# Patient Record
Sex: FEMALE | Race: WHITE
[De-identification: names, ages, dates, MRNs, and addresses within clinical notes are randomized per-mention and may not be internally consistent; named-entity substitution may affect disease eponyms.]

---

## 2017-06-21 ENCOUNTER — HOSPITAL ENCOUNTER (OUTPATIENT)
Dept: HOSPITAL 53 - M RAD | Age: 77
End: 2017-06-21
Attending: NURSE PRACTITIONER
Payer: MEDICARE

## 2017-06-21 DIAGNOSIS — Z12.31: Primary | ICD-10-CM

## 2017-06-21 NOTE — REPMRS
Patient History

The patient states she has not had a clinical breast exam in over

a year. Patient is postmenopausal.Patient has had a 10-15 pound 

weight loss.

Family history of ovarian cancer in paternal aunt at age 60, 

unknown cancer in paternal cousin at age 25, and breast cancer in

daughter at age 45.

 

Digital Mammo Screening Bilat: June 21, 2017 - Exam #: 

[id number]

Bilateral CC and MLO view(s) were taken.

 

Technologist: Nancie Miller Technologist

Prior study comparison: June 23, 2016, bilateral digital mammo 

screening bilat performed at [facility].  September 4, 2014, bilateral bilat screen digital mammo, performed at 

[facility] ([facility]).

 

FINDINGS: There are scattered fibroglandular densities.  There 

has been no change in the appearance of the mammogram from the 

prior studies.  There is a mild amount of residual fibroglandular

tissue which is fairly symmetric. There is no interval 

development of dominant mass, architectural distortion, or 

clustered microcalcification suggestive of malignancy.

 

ASSESSMENT: BI-RADS/ACR category 1 mammogram. Negative.

 

Recommendation

Routine screening mammogram in 1 year (for women over age 40).

This mammogram was interpreted with the aid of an FDA-approved 

computer-aided dectection system.

 

Electronically Signed By: Kristian Suarez MD 06/21/17 [id number]

## 2018-02-14 ENCOUNTER — HOSPITAL ENCOUNTER (OUTPATIENT)
Dept: HOSPITAL 53 - M LAB REF | Age: 78
End: 2018-02-14
Attending: NURSE PRACTITIONER
Payer: MEDICARE

## 2018-02-14 DIAGNOSIS — R41.81: Primary | ICD-10-CM

## 2018-02-14 LAB
SYPHILIS: NONREACTIVE
VITAMIN B12 LEVEL: 323 PG/ML (ref 247–911)

## 2018-02-14 PROCEDURE — 82607 VITAMIN B-12: CPT

## 2018-07-19 ENCOUNTER — HOSPITAL ENCOUNTER (OUTPATIENT)
Dept: HOSPITAL 53 - M RAD | Age: 78
End: 2018-07-19
Attending: NURSE PRACTITIONER
Payer: MEDICARE

## 2018-07-19 DIAGNOSIS — Z12.31: Primary | ICD-10-CM

## 2018-07-19 PROCEDURE — 77067 SCR MAMMO BI INCL CAD: CPT

## 2018-10-24 ENCOUNTER — HOSPITAL ENCOUNTER (EMERGENCY)
Dept: HOSPITAL 53 - M ED | Age: 78
Discharge: ANOTHER HOSPITAL | End: 2018-10-24
Payer: MEDICARE

## 2018-10-24 DIAGNOSIS — Z79.899: ICD-10-CM

## 2018-10-24 DIAGNOSIS — K21.9: ICD-10-CM

## 2018-10-24 DIAGNOSIS — R27.0: ICD-10-CM

## 2018-10-24 DIAGNOSIS — I63.9: Primary | ICD-10-CM

## 2018-10-24 DIAGNOSIS — I10: ICD-10-CM

## 2018-10-24 LAB
ANION GAP: 4 MEQ/L (ref 8–16)
BASO #: 0 10^3/UL (ref 0–0.2)
BASO %: 0.3 % (ref 0–1)
BLOOD UREA NITROGEN: 9 MG/DL (ref 7–18)
CALCIUM LEVEL: 8.5 MG/DL (ref 8.8–10.2)
CARBON DIOXIDE LEVEL: 30 MEQ/L (ref 21–32)
CHLORIDE LEVEL: 106 MEQ/L (ref 98–107)
CK MB CFR.DF SERPL CALC: 1.92
CK SERPL-CCNC: 125 U/L (ref 26–192)
CK-MB VALUE MASS: 2 NG/ML (ref ?–3.6)
CREATININE FOR GFR: 0.47 MG/DL (ref 0.55–1.3)
EOS #: 0.1 10^3/UL (ref 0–0.5)
EOSINOPHIL NFR BLD AUTO: 1.3 % (ref 0–3)
GFR SERPL CREATININE-BSD FRML MDRD: > 60 ML/MIN/{1.73_M2} (ref 39–?)
GLUCOSE BLDC GLUCOMTR-MCNC: 76 MG/DL (ref 83–110)
GLUCOSE, FASTING: 82 MG/DL (ref 70–100)
GOLD SPEC TUBE: (no result)
HEMATOCRIT: 38.5 % (ref 36–47)
HEMOGLOBIN: 12.7 G/DL (ref 12–15.5)
IMMATURE GRANULOCYTE %: 0.3 % (ref 0–3)
INR: 0.92
LYMPH #: 2 10^3/UL (ref 1.5–4.5)
LYMPH %: 26.6 % (ref 24–44)
MEAN CORPUSCULAR HEMOGLOBIN: 31.2 PG (ref 27–33)
MEAN CORPUSCULAR HGB CONC: 33 G/DL (ref 32–36.5)
MEAN CORPUSCULAR VOLUME: 94.6 FL (ref 80–96)
MONO #: 0.7 10^3/UL (ref 0–0.8)
MONO %: 8.6 % (ref 0–5)
NEUTROPHILS #: 4.8 10^3/UL (ref 1.8–7.7)
NEUTROPHILS %: 62.9 % (ref 36–66)
NRBC BLD AUTO-RTO: 0 % (ref 0–0)
PARTIAL THROMBOPLASTIN TIME: 31.7 SECONDS (ref 25.4–37.6)
PLATELET COUNT, AUTOMATED: 242 10^3/UL (ref 150–450)
POTASSIUM SERUM: 4.2 MEQ/L (ref 3.5–5.1)
PROTHROMBIN TIME: 12.5 SECONDS (ref 12.1–14.4)
RED BLOOD COUNT: 4.07 10^6/UL (ref 4–5.4)
RED CELL DISTRIBUTION WIDTH: 13.3 % (ref 11.5–14.5)
SODIUM LEVEL: 140 MEQ/L (ref 136–145)
TROPONIN I: < 0.02 NG/ML (ref ?–0.1)
WHITE BLOOD COUNT: 7.6 10^3/UL (ref 4–10)

## 2018-10-24 PROCEDURE — 71045 X-RAY EXAM CHEST 1 VIEW: CPT

## 2019-03-08 ENCOUNTER — HOSPITAL ENCOUNTER (OUTPATIENT)
Dept: HOSPITAL 53 - M SDC | Age: 79
Discharge: HOME | End: 2019-03-08
Attending: INTERNAL MEDICINE
Payer: MEDICARE

## 2019-03-08 VITALS — WEIGHT: 158 LBS | BODY MASS INDEX: 31.02 KG/M2 | HEIGHT: 60 IN

## 2019-03-08 VITALS — DIASTOLIC BLOOD PRESSURE: 75 MMHG | SYSTOLIC BLOOD PRESSURE: 103 MMHG

## 2019-03-08 DIAGNOSIS — K21.9: ICD-10-CM

## 2019-03-08 DIAGNOSIS — Z79.82: ICD-10-CM

## 2019-03-08 DIAGNOSIS — E66.9: ICD-10-CM

## 2019-03-08 DIAGNOSIS — I10: ICD-10-CM

## 2019-03-08 DIAGNOSIS — E03.9: ICD-10-CM

## 2019-03-08 DIAGNOSIS — I63.9: Primary | ICD-10-CM

## 2019-03-08 DIAGNOSIS — R94.31: ICD-10-CM

## 2019-03-08 DIAGNOSIS — I25.2: ICD-10-CM

## 2019-03-08 DIAGNOSIS — E78.5: ICD-10-CM

## 2019-03-08 DIAGNOSIS — M54.5: ICD-10-CM

## 2019-03-08 DIAGNOSIS — Z79.899: ICD-10-CM

## 2019-03-08 DIAGNOSIS — R01.1: ICD-10-CM

## 2019-03-08 PROCEDURE — 93312 ECHO TRANSESOPHAGEAL: CPT

## 2019-03-08 PROCEDURE — 93320 DOPPLER ECHO COMPLETE: CPT

## 2019-03-08 PROCEDURE — 93325 DOPPLER ECHO COLOR FLOW MAPG: CPT

## 2019-03-08 NOTE — T-ECHO
DATE OF PROCEDURE:  03/08/2019

 

REFERRING PHYSICIAN: Dina Coburn MD

 

PREPROCEDURE DIAGNOSIS:  Cryptogenic stroke.

 

POSTPROCEDURE DIAGNOSIS: Cryptogenic stroke, patent foramen ovale.

 

FINDINGS: Patent foramen ovale.

 

PROCEDURE: Transesophageal echocardiogram with bubble study.

 

SURGEON: Addison Marks MD

 

ASSISTANT: None.

 

CONSCIOUS SEDATION: IV conscious sedation: Midazolam 4 mg IV

 

COMPLICATIONS: None.

 

PROCEDURE DESCRIPTION:

Rhythm was sinus. The patient received viscus lidocaine to gargle and then

swallow. She received a total of 4 mg of Midazolam IV for conscious sedation.

Esophageal intubation was accomplished without difficulty using a [employer]'s 3D

transesophageal echocardiogram probe. The region of the intra-atrial septum was

anatomically suspicious for presence of a patent foramen ovale. No color flow

Doppler was seen traversing the atrial septum. No intra-atrial septal aneurysm.

 

A total of three bubble studies was performed. The first bubble study was

performed using 1 mL of the patient's own blood mixed with a mL of air and 8 mL

of injectable saline. The other two bubbles studies that followed were performed

with 9 mL of injectable normal saline and 1 mL of air. Each of the bubble studies

was performed by swishing the contents of one 10 mL into another 10 mL syringe

interconnected by a three-way stopcock to create bubbles back and forth. With

each of the bubble studies Valsalva maneuver release was performed. On the third

bubble study, I also had the patient cough several times. The third bubble study,

during coughing was able to demonstrate abrupt opening of the patent foramen

ovale with a mild to moderate amount of saline bubble contrast to shunt

transiently from the right atrium to the left atrium via the patent foramen ovale

(PFO).

 

Atrial connections to the left atrium pulmonary veins was anatomically normal. No

mass or thrombi were seen within the atria or their appendages. The left and

right ventricles appeared normal in size and systolic function. Left ventricle

ejection fraction was 65% by visual estimate. No regional wall motion

abnormalities of the left ventricle. No pericardial effusion. The tricuspid,

pulmonic and mitral valves were structurally and functionally normal. Mild mitral

regurgitation was present and within normal limits. Aortic valve was three-cuspid

and displayed mild focal thickening and focal calcific deposits. No aortic

regurgitation.

 

Distal aortic arch and descending thoracic aorta showed mild atherosclerotic

plaque/atheroma.

 

CONCLUSIONS:

1. Small patent foramen ovale with demonstration of mild to moderate bubble

contrast shunting from right atrium to left atrium via the PFO during cough

maneuver.

2. Normal left ventricle size and systolic function.

3. No masses or thrombi within the atria or their appendages.

4. Mild aortic valve sclerosis of a three-cuspid aortic valve. No aortic

regurgitation.

5. Mild atherosclerosis/atheroma involving the distal aortic arch and descending

thoracic aorta.

 

RECOMMENDATIONS:

Consider closure of the PFO with a PFO closure device. Further management to be

decided by this patient's attending cardiologist, Dr. Zachery Reed.

## 2019-08-23 ENCOUNTER — HOSPITAL ENCOUNTER (EMERGENCY)
Dept: HOSPITAL 53 - M ED | Age: 79
Discharge: HOME | End: 2019-08-23
Payer: MEDICARE

## 2019-08-23 VITALS — WEIGHT: 162.04 LBS | HEIGHT: 64 IN | BODY MASS INDEX: 27.66 KG/M2

## 2019-08-23 VITALS — DIASTOLIC BLOOD PRESSURE: 89 MMHG | SYSTOLIC BLOOD PRESSURE: 120 MMHG

## 2019-08-23 DIAGNOSIS — G62.9: Primary | ICD-10-CM

## 2019-08-23 DIAGNOSIS — Z79.899: ICD-10-CM

## 2019-08-23 DIAGNOSIS — E78.5: ICD-10-CM

## 2019-08-23 DIAGNOSIS — I48.91: ICD-10-CM

## 2019-08-23 DIAGNOSIS — Z86.73: ICD-10-CM

## 2019-08-23 LAB
APTT BLD: 35.2 SECONDS (ref 25–38.4)
BASOPHILS # BLD AUTO: 0 10^3/UL (ref 0–0.2)
BASOPHILS NFR BLD AUTO: 0.2 % (ref 0–1)
BUN SERPL-MCNC: 13 MG/DL (ref 7–18)
CALCIUM SERPL-MCNC: 8.5 MG/DL (ref 8.8–10.2)
CHLORIDE SERPL-SCNC: 103 MEQ/L (ref 98–107)
CK MB CFR.DF SERPL CALC: 2.34
CK MB SERPL-MCNC: 1.8 NG/ML (ref ?–3.6)
CK SERPL-CCNC: 77 U/L (ref 26–192)
CO2 SERPL-SCNC: 30 MEQ/L (ref 21–32)
CREAT SERPL-MCNC: 0.68 MG/DL (ref 0.55–1.3)
EOSINOPHIL # BLD AUTO: 0.1 10^3/UL (ref 0–0.5)
EOSINOPHIL NFR BLD AUTO: 1.1 % (ref 0–3)
GFR SERPL CREATININE-BSD FRML MDRD: > 60 ML/MIN/{1.73_M2} (ref 39–?)
GLUCOSE SERPL-MCNC: 103 MG/DL (ref 70–100)
HCT VFR BLD AUTO: 37.5 % (ref 36–47)
HGB BLD-MCNC: 12.6 G/DL (ref 12–15.5)
INR PPP: 1.07
LYMPHOCYTES # BLD AUTO: 2 10^3/UL (ref 1.5–4.5)
LYMPHOCYTES NFR BLD AUTO: 20 % (ref 24–44)
MCH RBC QN AUTO: 30.9 PG (ref 27–33)
MCHC RBC AUTO-ENTMCNC: 33.6 G/DL (ref 32–36.5)
MCV RBC AUTO: 91.9 FL (ref 80–96)
MONOCYTES # BLD AUTO: 0.7 10^3/UL (ref 0–0.8)
MONOCYTES NFR BLD AUTO: 7.2 % (ref 0–5)
NEUTROPHILS # BLD AUTO: 7.3 10^3/UL (ref 1.8–7.7)
NEUTROPHILS NFR BLD AUTO: 71.2 % (ref 36–66)
PLATELET # BLD AUTO: 228 10^3/UL (ref 150–450)
POTASSIUM SERPL-SCNC: 3.8 MEQ/L (ref 3.5–5.1)
PROTHROMBIN TIME: 13.6 SECONDS (ref 11.8–14)
RBC # BLD AUTO: 4.08 10^6/UL (ref 4–5.4)
SODIUM SERPL-SCNC: 139 MEQ/L (ref 136–145)
T4 FREE SERPL-MCNC: 0.93 NG/DL (ref 0.76–1.46)
TROPONIN I SERPL-MCNC: < 0.02 NG/ML (ref ?–0.1)
TSH SERPL DL<=0.005 MIU/L-ACNC: 1.64 UIU/ML (ref 0.36–3.74)
WBC # BLD AUTO: 10.2 10^3/UL (ref 4–10)

## 2019-08-23 NOTE — ECGEPIP
[facility] - ED

                                       

                                       Test Date:    2019

Pat Name:     VALENTIN CASTRO                Department:   

Patient ID:   [id number]                 Room:         -

Gender:       Female                   Technician:   BRODY

:          1940               Requested By: ADDISON JUAREZ

Order Number: [id number]     Reading MD:   Addsion López

                                 Measurements

Intervals                              Axis          

Rate:         115                      P:            

WV:           0                        QRS:          -1

QRSD:         89                       T:            7

QT:           310                                    

QTc:          430                                    

                           Interpretive Statements

Uncertain supraventricular rhythm, suspect atrial fibrillation with RVR

LOW QRS VOLTAGE IN EXTREMITY LEADS

Nonspecific T wave abnormality

Electronically Signed on 2019 23:52:58 EDT by Addison López

## 2019-08-23 NOTE — REP
REASON: Stroke like symptoms.

 

COMPARISON: 10/24/2018.

 

There is no significant change from the prior exam. The ventricles and sulci are

stable. Scattered lucencies are seen throughout the deep cerebral white matter of

the corona radiata and centrum semi-ovale bilaterally. There are no extra axial

collections. There is no shift in the midline structures. There is no evidence of

an acute intracranial hemorrhagic or nonhemorrhagic event. There is no evidence

of a skull fracture. The imaged paranasal sinuses and mastoid air cells are

unchanged and remaining clear.

 

IMPRESSION:No significant change from the prior exam. Chronic changes as

described above. No evidence of acute disease.

 

 

Electronically Signed by

Won Briceno DO 09/03/2019 10:06 A

## 2019-08-23 NOTE — REP
CHEST, PORTABLE:

 

AP portable view of the chest is performed and compared to prior study of

10/24/2018. There is mild to moderate elevation of the right hemidiaphragm again

noted. There is no acute infiltrate. Cardiac silhouette is upper limits or

normal. There is mild calcification and tortuosity of the thoracic aorta. There

is a hiatal hernia. Mediastinal silhouette is unchanged.

 

IMPRESSION:

 

No acute infiltrate.

 

 

Electronically Signed by

Kristian Suarez MD 08/25/2019 11:10 P

## 2020-01-07 ENCOUNTER — HOSPITAL ENCOUNTER (OUTPATIENT)
Dept: HOSPITAL 53 - M SFHCPLAZ | Age: 80
End: 2020-01-07
Attending: NURSE PRACTITIONER
Payer: MEDICARE

## 2020-01-07 DIAGNOSIS — R19.7: Primary | ICD-10-CM

## 2020-01-07 DIAGNOSIS — E03.9: ICD-10-CM

## 2020-01-07 LAB
ALBUMIN SERPL BCG-MCNC: 2.2 GM/DL (ref 3.2–5.2)
ALT SERPL W P-5'-P-CCNC: 39 U/L (ref 12–78)
BILIRUB SERPL-MCNC: 0.6 MG/DL (ref 0.2–1)
BUN SERPL-MCNC: 9 MG/DL (ref 7–18)
CALCIUM SERPL-MCNC: 8.6 MG/DL (ref 8.8–10.2)
CHLORIDE SERPL-SCNC: 97 MEQ/L (ref 98–107)
CO2 SERPL-SCNC: 32 MEQ/L (ref 21–32)
CREAT SERPL-MCNC: 0.49 MG/DL (ref 0.55–1.3)
GFR SERPL CREATININE-BSD FRML MDRD: > 60 ML/MIN/{1.73_M2} (ref 39–?)
GLUCOSE SERPL-MCNC: 85 MG/DL (ref 70–100)
HCT VFR BLD AUTO: 32.5 % (ref 36–47)
HGB BLD-MCNC: 10.6 G/DL (ref 12–15.5)
MCH RBC QN AUTO: 29.4 PG (ref 27–33)
MCHC RBC AUTO-ENTMCNC: 32.6 G/DL (ref 32–36.5)
MCV RBC AUTO: 90 FL (ref 80–96)
PLATELET # BLD AUTO: 432 10^3/UL (ref 150–450)
POTASSIUM SERPL-SCNC: 3.5 MEQ/L (ref 3.5–5.1)
PROT SERPL-MCNC: 6.2 GM/DL (ref 6.4–8.2)
RBC # BLD AUTO: 3.61 10^6/UL (ref 4–5.4)
SODIUM SERPL-SCNC: 135 MEQ/L (ref 136–145)
TSH SERPL DL<=0.005 MIU/L-ACNC: 3.71 UIU/ML (ref 0.36–3.74)
WBC # BLD AUTO: 14 10^3/UL (ref 4–10)

## 2020-01-07 PROCEDURE — 84443 ASSAY THYROID STIM HORMONE: CPT

## 2020-01-07 PROCEDURE — 36415 COLL VENOUS BLD VENIPUNCTURE: CPT

## 2020-01-07 PROCEDURE — 85027 COMPLETE CBC AUTOMATED: CPT

## 2020-01-07 PROCEDURE — 80053 COMPREHEN METABOLIC PANEL: CPT

## 2020-01-08 ENCOUNTER — HOSPITAL ENCOUNTER (OUTPATIENT)
Dept: HOSPITAL 53 - M SFHCPLAZ | Age: 80
End: 2020-01-08
Attending: NURSE PRACTITIONER
Payer: MEDICARE

## 2020-01-08 DIAGNOSIS — R19.7: Primary | ICD-10-CM

## 2020-04-28 ENCOUNTER — HOSPITAL ENCOUNTER (INPATIENT)
Dept: HOSPITAL 53 - M ED | Age: 80
LOS: 6 days | Discharge: HOME HEALTH | DRG: 872 | End: 2020-05-04
Attending: INTERNAL MEDICINE | Admitting: INTERNAL MEDICINE
Payer: MEDICARE

## 2020-04-28 VITALS — DIASTOLIC BLOOD PRESSURE: 74 MMHG | SYSTOLIC BLOOD PRESSURE: 113 MMHG

## 2020-04-28 VITALS — HEIGHT: 62 IN | WEIGHT: 158.07 LBS | BODY MASS INDEX: 29.09 KG/M2

## 2020-04-28 DIAGNOSIS — K57.20: ICD-10-CM

## 2020-04-28 DIAGNOSIS — E03.9: ICD-10-CM

## 2020-04-28 DIAGNOSIS — K21.9: ICD-10-CM

## 2020-04-28 DIAGNOSIS — M47.812: ICD-10-CM

## 2020-04-28 DIAGNOSIS — E87.1: ICD-10-CM

## 2020-04-28 DIAGNOSIS — A41.9: Primary | ICD-10-CM

## 2020-04-28 DIAGNOSIS — E86.0: ICD-10-CM

## 2020-04-28 DIAGNOSIS — Z79.01: ICD-10-CM

## 2020-04-28 DIAGNOSIS — R55: ICD-10-CM

## 2020-04-28 DIAGNOSIS — F03.90: ICD-10-CM

## 2020-04-28 DIAGNOSIS — I48.91: ICD-10-CM

## 2020-04-28 DIAGNOSIS — E46: ICD-10-CM

## 2020-04-28 DIAGNOSIS — E78.2: ICD-10-CM

## 2020-04-28 DIAGNOSIS — F41.9: ICD-10-CM

## 2020-04-28 DIAGNOSIS — K44.9: ICD-10-CM

## 2020-04-28 DIAGNOSIS — F32.9: ICD-10-CM

## 2020-04-28 DIAGNOSIS — Z79.899: ICD-10-CM

## 2020-04-28 DIAGNOSIS — Q21.1: ICD-10-CM

## 2020-04-28 DIAGNOSIS — Z96.641: ICD-10-CM

## 2020-04-28 DIAGNOSIS — E87.6: ICD-10-CM

## 2020-04-28 DIAGNOSIS — Z86.73: ICD-10-CM

## 2020-04-28 LAB
ALBUMIN SERPL BCG-MCNC: 2.2 GM/DL (ref 3.2–5.2)
ALT SERPL W P-5'-P-CCNC: 30 U/L (ref 12–78)
APTT BLD: 39 SECONDS (ref 25–38.4)
BILIRUB CONJ SERPL-MCNC: 0.4 MG/DL (ref 0–0.2)
BILIRUB SERPL-MCNC: 0.7 MG/DL (ref 0.2–1)
BUN SERPL-MCNC: 15 MG/DL (ref 7–18)
CALCIUM SERPL-MCNC: 8.6 MG/DL (ref 8.8–10.2)
CHLORIDE SERPL-SCNC: 96 MEQ/L (ref 98–107)
CO2 SERPL-SCNC: 25 MEQ/L (ref 21–32)
CREAT SERPL-MCNC: 0.39 MG/DL (ref 0.55–1.3)
CRP SERPL-MCNC: 16.7 MG/DL (ref 0–0.3)
GFR SERPL CREATININE-BSD FRML MDRD: > 60 ML/MIN/{1.73_M2} (ref 39–?)
GLUCOSE SERPL-MCNC: 100 MG/DL (ref 70–100)
HCT VFR BLD AUTO: 38.6 % (ref 36–47)
HGB BLD-MCNC: 12.6 G/DL (ref 12–15.5)
INR PPP: 1.6
MCH RBC QN AUTO: 27.1 PG (ref 27–33)
MCHC RBC AUTO-ENTMCNC: 32.6 G/DL (ref 32–36.5)
MCV RBC AUTO: 83 FL (ref 80–96)
NT-PRO BNP: 2523 PG/ML (ref ?–450)
PLATELET # BLD AUTO: 437 10^3/UL (ref 150–450)
POTASSIUM SERPL-SCNC: 2.9 MEQ/L (ref 3.5–5.1)
PROT SERPL-MCNC: 7.5 GM/DL (ref 6.4–8.2)
PROTHROMBIN TIME: 18.7 SECONDS (ref 11.8–14)
RBC # BLD AUTO: 4.65 10^6/UL (ref 4–5.4)
SODIUM SERPL-SCNC: 129 MEQ/L (ref 136–145)
T4 FREE SERPL-MCNC: 1.27 NG/DL (ref 0.76–1.46)
TSH SERPL DL<=0.005 MIU/L-ACNC: 5.31 UIU/ML (ref 0.36–3.74)
WBC # BLD AUTO: 24.1 10^3/UL (ref 4–10)

## 2020-04-28 RX ADMIN — POTASSIUM CHLORIDE SCH MLS/HR: 7.46 INJECTION, SOLUTION INTRAVENOUS at 15:45

## 2020-04-28 RX ADMIN — POTASSIUM CHLORIDE SCH MLS/HR: 7.46 INJECTION, SOLUTION INTRAVENOUS at 17:00

## 2020-04-28 RX ADMIN — POTASSIUM CHLORIDE SCH MLS/HR: 7.46 INJECTION, SOLUTION INTRAVENOUS at 13:46

## 2020-04-28 NOTE — REP
CT study of the cervical spine without contrast:

 

History: Injury in a fall.

 

Technique:  Helical scanning is acquired and overlapping 2 mm high resolution

axial images were generated and reviewed at bone and soft tissue window settings.

Coronal and sagittal multiplanar re-formations images are generated.

 

CT findings:  There is no evidence of cervical spine element fracture.  No skull

base fracture is seen.  Cervical vertebral body heights are preserved.  Alignment

is normal.  Facet joints are normally aligned bilaterally at each cervical level

on multiplanar re-formations images.  There is no evidence of intraspinal or

paraspinal hematoma.  No extra vertebral abnormality is seen.

 

There is straightening of the normal cervical lordosis.  Degenerative disc

changes are noted most pronounced at C5-6 and C6-7 but also to a lesser degree at

C4-5 and C3-4.  There is some degenerative disc calcification at these levels.

Mild osteoarthritic facet hypertrophy and joint space narrowing is seen

bilaterally in the cervical facet joints.  There is a dextroconvex curvature.

 

Impression:

 

Degenerative spondylosis changes.  Straightening and dextroconvex curvature

noted.  Otherwise negative CT study of the cervical spine without contrast.  No

fracture seen.

 

 

Electronically Signed by

Porfirio Vasquez MD 04/28/2020 11:52 A

## 2020-04-28 NOTE — REP
CT ABDOMEN/PELVIS WITH IV CONTRAST:

 

TECHNIQUE:  Axial contrast enhanced images from the lung bases to the pubic

symphysis using 100 mL Isovue-370 intravenous contrast material with multiplanar

reformations.

 

In the visualized lung bases, there are dependent atelectatic changes. There is a

small left pleural effusion.

 

There is a moderate to large hiatal hernia.

 

The liver appears unremarkable. Gallbladder is moderately distended with no

definite intraluminal calculus. Spleen is normal in size with no intrinsic

abnormality. There is mild adrenal gland thickening bilaterally. Pancreas

demonstrates no mass. There are cysts of each kidney with no hydronephrosis.

There is no abdominal aortic aneurysm. There is no adenopathy.

 

Scattered free air is seen in the upper abdomen and in the pelvis. There is mild

free fluid in the pelvis. Diffuse colonic diverticulosis is noted. There is

diffuse thickening of the sigmoid colon, compatible with diverticulitis. There is

adjacent phlegmonous change with ill-defined air and fluid in the right pelvis

extending superiorly. There is a right lower quadrant air-fluid collection,

consistent with an abscess having a diameter of approximately 9.5 cm. Large

calcified mass anteriorly in the pelvis measures about 5 cm in diameter and

probably represents a calcified uterine fibroid. Metallic prosthesis is noted of

the right hip. There are degenerative changes of the lumbar spine.

 

IMPRESSION:

 

Small left pleural effusion.

 

Scattered free air in the abdomen and pelvis. There appears to be perforated

diverticulitis of the sigmoid with adjacent extraluminal air and fluid and

phlegmonous change in the right pelvis extending superiorly. There is a right

lower quadrant abscess measuring 9.5 cm in diameter. Findings were conveyed to

Dr. Jenkins in the ER at the time of the exam.

 

Moderate to large hiatal hernia. Moderately distended gallbladder. Calcified

uterine fibroid.

 

 

Electronically Signed by

Kristian Suarez MD 04/29/2020 09:34 A

## 2020-04-28 NOTE — REP
CT BRAIN WITHOUT CONTRAST:

 

HISTORY:  Injury in a fall.

 

COMPARISON STUDY:  August 23, 2019.

 

FINDINGS:   Digital preliminary  radiographs are unremarkable.  Bone window

settings demonstrate an intact bony calvarium.  Visualized paranasal sinuses are

clear.  No intraorbital abnormality is appreciated.

 

On soft tissue window settings, there is mild to moderate generalized volume loss

again noted.  There is evidence of an old right cerebellar infarct superiorly

unchanged.  No acute cortical infarction is seen.  There is no evidence of

intracranial hemorrhage.  No mass, extra-axial fluid collection or midline shift

is seen.

 

IMPRESSION:

 

No acute intracranial abnormality.  Generalized volume loss.  Old right superior

cerebellar infarct.  Otherwise negative.

 

 

Electronically Signed by

Porfirio Vasquez MD 04/28/2020 01:17 P

## 2020-04-28 NOTE — HPEPDOC
[facility] Medical History & Physical


Date of Admission


2020


Date of Service:  2020


Primary Care Physician:  Virginia Benavides


Attending Physician:  GONDAL,KHUBAIB N. MD





History and Physical


PRIMARY CARE PROVIDER:  Virginia Benavides





ATTENDING: Dr. Khubaib Gondal





CHIEF COMPLAINT: LOC





HISTORY OF PRESENT ILLNESS: Patient is a 80 year old female presenting with 

chief complaint of loss of consciousness. Patient was found by her daughter this

morning after finding her on the ground. When she found her she was conscious 

and did not remember what happened but thinks she did lose consciousness and hit

her head on the ground. She states she has had loose stools since last Thursday,

but thinks there have only been 2/day with decreased PO intake. In the ED she 

was found to be tachycardic with 2+ pitting edema, WBC count of 24, 

hyponatremia, hypokalemia, lactic acidosis, and an elevated BNP. CT head, C-

spine, CXR were negative, with CTabd/pelvis demonstrating microperforation and 

RLQ abdominal abscess. Patient received 1x zosyn and 1.5 liters of fluid 

resuscitation.








PAST MEDICAL HISTORY: 


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


GERD


Hypothyroidism


Mixed hyperlipidemia


Cognitive impairment


PFO





PAST SURGICAL HISTORY:


Transverse carpal tunnel release 


Left rotator cuff repair


Right total hip arthroplasty


revision of right hip








SOCIAL HISTORY: denies alcohol use, denies use of tobacco products, denies any 

marijuana, heroin, cocaine, or PCP use. 








FAMILY HISTORY: Patient does not recall. 





ALLERGIES: Please see below.





REVIEW OF SYSTEMS:


GENERAL: Denies fevers, chills, recent unexpected weight change, night sweats, 

hemoptysis


HEENT: Denies headache, dizziness, vision changes, hearing loss, sore throat


CARDIOVASCULAR: Denies chest pain, palpitations, orthopnea


RESPIRATORY: Denies shortness of breath, wheezing, cough


GASTROINTESTINAL: denies nausea, vomiting, abdominal pain, constipation, bloody 

stool. Admits to loose stool since last Thursday. 


GENITOURINARY: Denies dysuria,urinary urgency, hematuria.


MUSCULOSKELETAL: Denies muscle/joint pain, weakness, stiffness


NEUROLOGICAL: Denies any numbness/tingling, focal weakness, or syncope





HOME MEDICATIONS: Please see below. 





PHYSICAL EXAMINATION:


Vitals: (see below) 


General: Pale appearing female in no acute distress, laying comfortably in bed.


HEENT: Normocephalic, atraumatic. EOMI. No scleral icterus. dry mucous 

membranes. No pharyngeal erythema or uvular deviation. 


Neck: No JVD, lymphadenopathy, or thyromegaly. 


Cardiac: RRR, Normal S1 and S2, No murmurs, gallops, rubs. 


Pulm: Clear to auscultation b/l. Symmetric thorax. No wheezing, crackles, 

rhonchi


Abd: Bowel Sounds present. Abdomen is soft, non-tender, non-distended. No 

guarding, rebound tenderness, or rigidity. No hepatosplenomegaly. No masses or 

eccymosis. 


Ext: 2+ pitting edema up to knees in bilateral LE, no cyanosis


Vascular: Capillary refill ~3seconds


Neuro: No focal neuro deficits. AOx3





LABORATORY DATA: See below.





IMAGIN2020 Head CT:


No acute intracranial abnormality. Generalized volume loss. Old right superior 

cerebellar infarct. Otherwise negative.





2020 Cervical spine CT:


Degenerative spondylosis changes. Straightening and dextroconvex curvature 

noted. Otherwise negative CT study of the cervical spine without contrast. No 

fracture seen.





2020 CXR:


No evidence of acute pulmonary disease





2020 CT abd/pelvis w/ contrast:


Small left pleural effusion. Scattered free air in the abdomen and pelvis. There

appears to be perforated diverticulitis of the sigmoid with adjacent 

extraluminal air and fluid and phlegmonous change in the right pelvis extending 

superiorly. There is a right lower quadrant abscess measuring 9.5 cm in 

diameter. Findings were conveyed to Dr. Jenkins in the ER at the time of the 

exam. Moderate to large hiatal hernia. Moderately distended gallbladder. 

Calcified uterine fibroid.


 


MICROBIOLOGY: Please see below. 





ASSESSMENT/PLAN: 


#. Sepsis from RLQ abscess likely 2/2 perforated diverticulitis


-Cipro/flagyl for diverticulitis. IR drainage to take place tomorrow morning. 

General surgery is aware of patient should the patient decompensate. Initiating 

gentle IVF hydration as patient has 2+ pitting edema with elevated BNP. Lactic 

acid elevated at 2.4, will continue with gentle IVF. 





#. Bilateral lower extremity edema


-Echo/bubble study in 2019 showing LVEF of 65% and small PFO. Mild 

atherosclerosis/atheroma involving distal aortic arch and descending thoracic 

aorta. 


-Repeat urgent echo pending


-Patient seems intravascularly depleted, will continue with IVF @75ml/hr after 

1.5L in ED





#. Hypovolemic hypotonic hyponatremia


- Likely secondary to decreased PO intake, dehydration since this past Thursday 





#. Hypokalemia


- Likely secondary to dehydration and decreased PO intake. 





#. LOC


-We suspect this is secondary to dehydration from her loose stool, she is 

showing no neuro deficits in the room and is alert/oriented but is clinically 

dehydrated





#. Afib


-Holding eliquis for procedure tomorrow morning, will resume after. 


- Continue atenolol for rate control





#. Hypothyroidism


-Continue synthroid





#. GERD


-Patient is on IV pantoprazole





#. Depression/anxiety


-Holding home sertraline until after procedure





#. Hypercholesterolemia


- Holding home statin





#. Dementia


-Holding home donepezil





-DVT prophy: holding for procedure, teds/seqs





Attending attestation:


I evaluated and examined the patient in person; I discussed the care with 

Resident in detail and agree with the plan above.





Vital Signs





Vital Signs








  Date Time  Temp Pulse Resp B/P (MAP) Pulse Ox O2 Delivery O2 Flow Rate FiO2


 


20 16:00  122 18 103/57 (72) 95 Room Air  


 


20 15:01 96.8       











Laboratory Data


Labs 24H


Laboratory Tests 2


20 12:44: 


Nucleated Red Blood Cells % (auto) 0.0, Anion Gap 8, Glomerular Filtration Rate 

> 60.0, Calcium Level 8.6L, Total Bilirubin 0.7, Direct Bilirubin 0.4H, 

Aspartate Amino Transf (AST/SGOT) 32, Alanine Aminotransferase (ALT/SGPT) 30, 

Alkaline Phosphatase 123H, C-Reactive Protein, Quantitative 16.70H, 

NT-Pro-B-Type Natriuretic Peptide 2523H, Total Protein 7.5, Albumin 2.2L, 

Albumin/Globulin Ratio 0.42L, Thyroid Stimulating Hormone (TSH) 5.310H, Free 

Thyroxine 1.27


20 15:37: Lactic Acid Level 2.4*H


CBC/BMP


Laboratory Tests


20 12:44








Microbiology





Microbiology


20 Blood Culture, Received


          Pending


20 Blood Culture, Received


          Pending





Home Medications


Scheduled


Apixaban (Eliquis) 5 Mg Tablet, 5 MG PO BID


Atenolol (Atenolol) 25 Mg Tablet, 25 MG PO DAILY


Atorvastatin Calcium (Atorvastatin Calcium) 40 Mg Tab, 40 MG PO QHS


Ciprofloxacin HCl (Ciprofloxacin HCl) 500 Mg Tablet, 500 MG PO BID


   FILLED 20 FOR 10 DAYS 


Donepezil HCl (Donepezil HCl) 10 Mg Tablet, 10 MG PO QHS


Furosemide (Lasix) 40 Mg Tablet, 40 MG PO DAILY


Levothyroxine Sodium (Levothyroxine Sodium) 50 Mcg Tab, 50 MCG PO DAILY


Metronidazole (Flagyl) 500 Mg Tablet, 500 MG PO Q8H


   FILLED 20 FOR 10 DAYS 


Multivitamins (Thera M Plus Tablet) 1 Tab Tab, 1 TAB PO DAILY


Omeprazole (Omeprazole) 20 Mg Cap, 20 MG PO BID


Sertraline HCl (Sertraline HCl) 50 Mg Tablet, 50 MG PO DAILY





Allergies


Coded Allergies:  


     No Known Allergies (Unverified , 20)





A-FIB/CHADSVASC


A-FIB History


Current/History of A-Fib/PAF?:  No





GME ATTESTATION


GME ATTESTATION


My faculty preceptor for this patient encounter was physically present during 

the encounter and was fully available. All aspects of the patient interview, 

examination, medical decision making process, and medical care plan development 

were reviewed and approved by the faculty preceptor. The faculty preceptor is 

aware and concurs with the plan as stated in the body of this note and will 

attest to such by his/her cosignature.











WENDY SORENSEN DO                 2020 17:31


GONDAL,KHUBAIB N. MD            May 5, 2020 19:03

## 2020-04-28 NOTE — REP
CHEST, SINGLE VIEW:

 

Single view of the chest is performed and compared to a prior study of

08/23/2019.

 

No acute infiltrate is seen.  There is again elevation of the right

hemidiaphragm.  Patient is rotated toward the right.  Cardiac silhouette is

prominent.  Visualized osseous structures demonstrate no definite fracture.

 

IMPRESSION:

 

No evidence of acute pulmonary disease.

 

 

Electronically Signed by

Kristian Suarez MD 04/28/2020 03:24 P

## 2020-04-29 VITALS — SYSTOLIC BLOOD PRESSURE: 100 MMHG | DIASTOLIC BLOOD PRESSURE: 70 MMHG

## 2020-04-29 VITALS — SYSTOLIC BLOOD PRESSURE: 115 MMHG | DIASTOLIC BLOOD PRESSURE: 67 MMHG

## 2020-04-29 VITALS — DIASTOLIC BLOOD PRESSURE: 88 MMHG | SYSTOLIC BLOOD PRESSURE: 133 MMHG

## 2020-04-29 VITALS — SYSTOLIC BLOOD PRESSURE: 116 MMHG | DIASTOLIC BLOOD PRESSURE: 72 MMHG

## 2020-04-29 VITALS — DIASTOLIC BLOOD PRESSURE: 68 MMHG | SYSTOLIC BLOOD PRESSURE: 102 MMHG

## 2020-04-29 VITALS — SYSTOLIC BLOOD PRESSURE: 106 MMHG | DIASTOLIC BLOOD PRESSURE: 77 MMHG

## 2020-04-29 LAB
ALBUMIN SERPL BCG-MCNC: 1.7 GM/DL (ref 3.2–5.2)
ALT SERPL W P-5'-P-CCNC: 22 U/L (ref 12–78)
ANISOCYTOSIS BLD QL SMEAR: (no result)
BILIRUB SERPL-MCNC: 0.5 MG/DL (ref 0.2–1)
BUN SERPL-MCNC: 11 MG/DL (ref 7–18)
CALCIUM SERPL-MCNC: 7.7 MG/DL (ref 8.8–10.2)
CHLORIDE SERPL-SCNC: 102 MEQ/L (ref 98–107)
CO2 SERPL-SCNC: 24 MEQ/L (ref 21–32)
CREAT SERPL-MCNC: 0.26 MG/DL (ref 0.55–1.3)
CRP SERPL-MCNC: 10.7 MG/DL (ref 0–0.3)
GFR SERPL CREATININE-BSD FRML MDRD: > 60 ML/MIN/{1.73_M2} (ref 39–?)
GLUCOSE SERPL-MCNC: 67 MG/DL (ref 70–100)
HCT VFR BLD AUTO: 29.6 % (ref 36–47)
HGB BLD-MCNC: 9.8 G/DL (ref 12–15.5)
LYMPHOCYTES NFR BLD MANUAL: 8 % (ref 16–44)
MCH RBC QN AUTO: 27 PG (ref 27–33)
MCHC RBC AUTO-ENTMCNC: 33.1 G/DL (ref 32–36.5)
MCV RBC AUTO: 81.5 FL (ref 80–96)
MONOCYTES NFR BLD MANUAL: 7 % (ref 0–5)
NEUTROPHILS NFR BLD MANUAL: 85 % (ref 28–66)
PLATELET # BLD AUTO: 405 10^3/UL (ref 150–450)
PLATELET BLD QL SMEAR: NORMAL
POTASSIUM SERPL-SCNC: 4.3 MEQ/L (ref 3.5–5.1)
PROT SERPL-MCNC: 5.2 GM/DL (ref 6.4–8.2)
RBC # BLD AUTO: 3.63 10^6/UL (ref 4–5.4)
SODIUM SERPL-SCNC: 133 MEQ/L (ref 136–145)
WBC # BLD AUTO: 19.9 10^3/UL (ref 4–10)

## 2020-04-29 PROCEDURE — 0W9G30Z DRAINAGE OF PERITONEAL CAVITY WITH DRAINAGE DEVICE, PERCUTANEOUS APPROACH: ICD-10-PCS | Performed by: PHYSICIAN ASSISTANT

## 2020-04-29 RX ADMIN — ACETAMINOPHEN PRN MG: 325 TABLET ORAL at 20:49

## 2020-04-29 RX ADMIN — ACETAMINOPHEN PRN MG: 325 TABLET ORAL at 09:18

## 2020-04-29 RX ADMIN — APIXABAN SCH MG: 5 TABLET, FILM COATED ORAL at 20:49

## 2020-04-29 RX ADMIN — LEVOTHYROXINE SODIUM SCH MCG: 50 TABLET ORAL at 05:57

## 2020-04-29 RX ADMIN — CIPROFLOXACIN SCH MLS/HR: 2 INJECTION, SOLUTION INTRAVENOUS at 17:20

## 2020-04-29 RX ADMIN — METRONIDAZOLE SCH MLS/HR: 500 INJECTION, SOLUTION INTRAVENOUS at 04:51

## 2020-04-29 RX ADMIN — ATORVASTATIN CALCIUM SCH MG: 20 TABLET, FILM COATED ORAL at 20:49

## 2020-04-29 RX ADMIN — CIPROFLOXACIN SCH MLS/HR: 2 INJECTION, SOLUTION INTRAVENOUS at 05:57

## 2020-04-29 RX ADMIN — METRONIDAZOLE SCH MLS/HR: 500 INJECTION, SOLUTION INTRAVENOUS at 20:49

## 2020-04-29 RX ADMIN — DEXTROSE MONOHYDRATE SCH MG: 50 INJECTION, SOLUTION INTRAVENOUS at 09:17

## 2020-04-29 RX ADMIN — METRONIDAZOLE SCH MLS/HR: 500 INJECTION, SOLUTION INTRAVENOUS at 12:28

## 2020-04-29 NOTE — REP
Ultrasound-guided abscess drain

 

The procedure was performed by WILSON Holland, under the direct

supervision of Dr. Suarez.

 

The risks and benefits of the procedure were explained to the patient and

informed consent was obtained both verbally and written.  Directly prior to the

start of the procedure, a formal timeout was completed in the procedure room.

 

Under ultrasound guidance, the right lower quadrant abscess was localized and

skin was marked. The skin was then prepped and draped in a sterile fashion.  6 ml

of buffered lidocaine was used as a local anesthetic.  Using ultrasound guidance,

an 10-Faroese multi side-hole the pigtail catheter was inserted using trocar

technique.  120 mL of pus fluid was withdrawn and sent to the lab for further

analysis. A drainage bag was hooked up to the end of the catheter, and the

patient was discharged from the department.

 

 

 

 

Reviewed by

WILSON Edwards 04/29/2020 01:35 P

Electronically Signed by

Kristian Suarez MD 04/29/2020 01:42 P

## 2020-04-29 NOTE — ECGEPIP
[facility] - ED

                                       

                                       Test Date:    2020

Pat Name:     VALENTIN CASTRO                Department:   

Patient ID:   [id number]                 Room:         -

Gender:       Female                   Technician:   lexi sanchez

:          1940               Requested By: SANA WREN 

Order Number: [id number]     Reading MD:   Sharmin Dimas

                                 Measurements

Intervals                              Axis          

Rate:         102                      P:            

AR:           0                        QRS:          -3

QRSD:         94                       T:            -33

QT:           371                                    

QTc:          485                                    

                           Interpretive Statements

ATRIAL FIBRILLATION WITH RAPID VENTRICULAR RESPONSE

LOW QRS VOLTAGE IN EXTREMITY LEADS

ABNORMAL RHYTHM ECG

NSTTW abnormalities

DECREASED RATE 19

Electronically Signed on 2020 16:35:30 EDT by Sharmin Dimas

## 2020-04-30 VITALS — SYSTOLIC BLOOD PRESSURE: 99 MMHG | DIASTOLIC BLOOD PRESSURE: 72 MMHG

## 2020-04-30 VITALS — DIASTOLIC BLOOD PRESSURE: 76 MMHG | SYSTOLIC BLOOD PRESSURE: 110 MMHG

## 2020-04-30 VITALS — DIASTOLIC BLOOD PRESSURE: 78 MMHG | SYSTOLIC BLOOD PRESSURE: 111 MMHG

## 2020-04-30 VITALS — SYSTOLIC BLOOD PRESSURE: 118 MMHG | DIASTOLIC BLOOD PRESSURE: 85 MMHG

## 2020-04-30 VITALS — SYSTOLIC BLOOD PRESSURE: 110 MMHG | DIASTOLIC BLOOD PRESSURE: 74 MMHG

## 2020-04-30 VITALS — SYSTOLIC BLOOD PRESSURE: 115 MMHG | DIASTOLIC BLOOD PRESSURE: 79 MMHG

## 2020-04-30 RX ADMIN — METRONIDAZOLE SCH MLS/HR: 500 INJECTION, SOLUTION INTRAVENOUS at 12:40

## 2020-04-30 RX ADMIN — METRONIDAZOLE SCH MLS/HR: 500 INJECTION, SOLUTION INTRAVENOUS at 04:00

## 2020-04-30 RX ADMIN — APIXABAN SCH MG: 5 TABLET, FILM COATED ORAL at 21:37

## 2020-04-30 RX ADMIN — CIPROFLOXACIN SCH MLS/HR: 2 INJECTION, SOLUTION INTRAVENOUS at 06:45

## 2020-04-30 RX ADMIN — LEVOTHYROXINE SODIUM SCH MCG: 50 TABLET ORAL at 06:45

## 2020-04-30 RX ADMIN — ATORVASTATIN CALCIUM SCH MG: 20 TABLET, FILM COATED ORAL at 21:37

## 2020-04-30 RX ADMIN — CIPROFLOXACIN SCH MLS/HR: 2 INJECTION, SOLUTION INTRAVENOUS at 17:55

## 2020-04-30 RX ADMIN — DEXTROSE MONOHYDRATE SCH MG: 50 INJECTION, SOLUTION INTRAVENOUS at 09:18

## 2020-04-30 RX ADMIN — APIXABAN SCH MG: 5 TABLET, FILM COATED ORAL at 09:18

## 2020-04-30 RX ADMIN — METRONIDAZOLE SCH MLS/HR: 500 INJECTION, SOLUTION INTRAVENOUS at 21:37

## 2020-05-01 VITALS — SYSTOLIC BLOOD PRESSURE: 102 MMHG | DIASTOLIC BLOOD PRESSURE: 76 MMHG

## 2020-05-01 VITALS — SYSTOLIC BLOOD PRESSURE: 107 MMHG | DIASTOLIC BLOOD PRESSURE: 75 MMHG

## 2020-05-01 VITALS — DIASTOLIC BLOOD PRESSURE: 79 MMHG | SYSTOLIC BLOOD PRESSURE: 114 MMHG

## 2020-05-01 LAB
BUN SERPL-MCNC: 6 MG/DL (ref 7–18)
CALCIUM SERPL-MCNC: 7.6 MG/DL (ref 8.8–10.2)
CHLORIDE SERPL-SCNC: 98 MEQ/L (ref 98–107)
CO2 SERPL-SCNC: 24 MEQ/L (ref 21–32)
CREAT SERPL-MCNC: 0.37 MG/DL (ref 0.55–1.3)
GFR SERPL CREATININE-BSD FRML MDRD: > 60 ML/MIN/{1.73_M2} (ref 39–?)
GLUCOSE SERPL-MCNC: 109 MG/DL (ref 70–100)
HCT VFR BLD AUTO: 29.5 % (ref 36–47)
HGB BLD-MCNC: 9.6 G/DL (ref 12–15.5)
MCH RBC QN AUTO: 26.9 PG (ref 27–33)
MCHC RBC AUTO-ENTMCNC: 32.5 G/DL (ref 32–36.5)
MCV RBC AUTO: 82.6 FL (ref 80–96)
PLATELET # BLD AUTO: 404 10^3/UL (ref 150–450)
POTASSIUM SERPL-SCNC: 4.2 MEQ/L (ref 3.5–5.1)
RBC # BLD AUTO: 3.57 10^6/UL (ref 4–5.4)
SODIUM SERPL-SCNC: 129 MEQ/L (ref 136–145)
WBC # BLD AUTO: 22.3 10^3/UL (ref 4–10)

## 2020-05-01 RX ADMIN — ATORVASTATIN CALCIUM SCH MG: 20 TABLET, FILM COATED ORAL at 20:06

## 2020-05-01 RX ADMIN — METRONIDAZOLE SCH MLS/HR: 500 INJECTION, SOLUTION INTRAVENOUS at 20:06

## 2020-05-01 RX ADMIN — LEVOTHYROXINE SODIUM SCH MCG: 50 TABLET ORAL at 06:42

## 2020-05-01 RX ADMIN — METRONIDAZOLE SCH MLS/HR: 500 INJECTION, SOLUTION INTRAVENOUS at 03:46

## 2020-05-01 RX ADMIN — METOPROLOL TARTRATE SCH MG: 25 TABLET, FILM COATED ORAL at 20:38

## 2020-05-01 RX ADMIN — DEXTROSE MONOHYDRATE SCH MG: 50 INJECTION, SOLUTION INTRAVENOUS at 10:30

## 2020-05-01 RX ADMIN — APIXABAN SCH MG: 5 TABLET, FILM COATED ORAL at 20:06

## 2020-05-01 RX ADMIN — CIPROFLOXACIN SCH MLS/HR: 2 INJECTION, SOLUTION INTRAVENOUS at 17:18

## 2020-05-01 RX ADMIN — CIPROFLOXACIN SCH MLS/HR: 2 INJECTION, SOLUTION INTRAVENOUS at 06:42

## 2020-05-01 RX ADMIN — METRONIDAZOLE SCH MLS/HR: 500 INJECTION, SOLUTION INTRAVENOUS at 12:19

## 2020-05-01 RX ADMIN — APIXABAN SCH MG: 5 TABLET, FILM COATED ORAL at 10:30

## 2020-05-01 NOTE — ECGEPIP
[facility]

                                       

                                       Test Date:    2020

Pat Name:     VALENTIN CASTRO                Department:   

Patient ID:   [id number]                 Room:         [place] [address]

Gender:       Female                   Technician:   JOSE ENRIQUE

:          1940               Requested By: WENDY SORENSEN 

Order Number: [id number]     Reading MD:   Gage Limon

                                 Measurements

Intervals                              Axis          

Rate:         113                      P:            

N[place]:           0                        QRS:          -11

QRSD:         93                       T:            -18

QT:           337                                    

QTc:          464                                    

                           Interpretive Statements

Atrial fibrillation with moderate ventricular response

Low QRS complex voltage in all leads

Consider prior AMI, age indeterminate

Nonspecific ST-T wave abnormalities

No significant change when compared to prior tracing of 2020

Electronically Signed on 2020 16:06:22 EDT by Gage Limon

## 2020-05-02 VITALS — SYSTOLIC BLOOD PRESSURE: 111 MMHG | DIASTOLIC BLOOD PRESSURE: 77 MMHG

## 2020-05-02 VITALS — SYSTOLIC BLOOD PRESSURE: 102 MMHG | DIASTOLIC BLOOD PRESSURE: 72 MMHG

## 2020-05-02 VITALS — DIASTOLIC BLOOD PRESSURE: 74 MMHG | SYSTOLIC BLOOD PRESSURE: 105 MMHG

## 2020-05-02 LAB
BUN SERPL-MCNC: 4 MG/DL (ref 7–18)
CALCIUM SERPL-MCNC: 7.5 MG/DL (ref 8.8–10.2)
CHLORIDE SERPL-SCNC: 98 MEQ/L (ref 98–107)
CO2 SERPL-SCNC: 27 MEQ/L (ref 21–32)
CREAT SERPL-MCNC: 0.3 MG/DL (ref 0.55–1.3)
GFR SERPL CREATININE-BSD FRML MDRD: > 60 ML/MIN/{1.73_M2} (ref 39–?)
GLUCOSE SERPL-MCNC: 110 MG/DL (ref 70–100)
HCT VFR BLD AUTO: 26.4 % (ref 36–47)
HGB BLD-MCNC: 9 G/DL (ref 12–15.5)
MCH RBC QN AUTO: 27.6 PG (ref 27–33)
MCHC RBC AUTO-ENTMCNC: 34.1 G/DL (ref 32–36.5)
MCV RBC AUTO: 81 FL (ref 80–96)
PLATELET # BLD AUTO: 400 10^3/UL (ref 150–450)
POTASSIUM SERPL-SCNC: 3.4 MEQ/L (ref 3.5–5.1)
RBC # BLD AUTO: 3.26 10^6/UL (ref 4–5.4)
SODIUM SERPL-SCNC: 132 MEQ/L (ref 136–145)
WBC # BLD AUTO: 14.7 10^3/UL (ref 4–10)

## 2020-05-02 RX ADMIN — POTASSIUM CHLORIDE SCH MEQ: 750 TABLET, EXTENDED RELEASE ORAL at 18:39

## 2020-05-02 RX ADMIN — CIPROFLOXACIN SCH MLS/HR: 2 INJECTION, SOLUTION INTRAVENOUS at 18:39

## 2020-05-02 RX ADMIN — METRONIDAZOLE SCH MLS/HR: 500 INJECTION, SOLUTION INTRAVENOUS at 12:26

## 2020-05-02 RX ADMIN — METOPROLOL TARTRATE SCH MG: 25 TABLET, FILM COATED ORAL at 20:22

## 2020-05-02 RX ADMIN — CIPROFLOXACIN SCH MLS/HR: 2 INJECTION, SOLUTION INTRAVENOUS at 06:09

## 2020-05-02 RX ADMIN — APIXABAN SCH MG: 5 TABLET, FILM COATED ORAL at 10:29

## 2020-05-02 RX ADMIN — LEVOTHYROXINE SODIUM SCH MCG: 50 TABLET ORAL at 06:09

## 2020-05-02 RX ADMIN — DEXTROSE MONOHYDRATE SCH MG: 50 INJECTION, SOLUTION INTRAVENOUS at 10:29

## 2020-05-02 RX ADMIN — POTASSIUM CHLORIDE SCH MEQ: 750 TABLET, EXTENDED RELEASE ORAL at 11:06

## 2020-05-02 RX ADMIN — APIXABAN SCH MG: 5 TABLET, FILM COATED ORAL at 20:22

## 2020-05-02 RX ADMIN — ATORVASTATIN CALCIUM SCH MG: 20 TABLET, FILM COATED ORAL at 20:22

## 2020-05-02 RX ADMIN — METRONIDAZOLE SCH MLS/HR: 500 INJECTION, SOLUTION INTRAVENOUS at 20:22

## 2020-05-02 RX ADMIN — POTASSIUM CHLORIDE SCH MEQ: 750 TABLET, EXTENDED RELEASE ORAL at 15:27

## 2020-05-02 RX ADMIN — METRONIDAZOLE SCH MLS/HR: 500 INJECTION, SOLUTION INTRAVENOUS at 04:54

## 2020-05-02 RX ADMIN — METOPROLOL TARTRATE SCH MG: 25 TABLET, FILM COATED ORAL at 10:29

## 2020-05-03 VITALS — SYSTOLIC BLOOD PRESSURE: 111 MMHG | DIASTOLIC BLOOD PRESSURE: 77 MMHG

## 2020-05-03 VITALS — DIASTOLIC BLOOD PRESSURE: 80 MMHG | SYSTOLIC BLOOD PRESSURE: 117 MMHG

## 2020-05-03 LAB
BUN SERPL-MCNC: 4 MG/DL (ref 7–18)
CALCIUM SERPL-MCNC: 7.7 MG/DL (ref 8.8–10.2)
CHLORIDE SERPL-SCNC: 99 MEQ/L (ref 98–107)
CO2 SERPL-SCNC: 27 MEQ/L (ref 21–32)
CREAT SERPL-MCNC: 0.34 MG/DL (ref 0.55–1.3)
GFR SERPL CREATININE-BSD FRML MDRD: > 60 ML/MIN/{1.73_M2} (ref 39–?)
GLUCOSE SERPL-MCNC: 107 MG/DL (ref 70–100)
HCT VFR BLD AUTO: 26.8 % (ref 36–47)
HGB BLD-MCNC: 8.9 G/DL (ref 12–15.5)
MCH RBC QN AUTO: 27.1 PG (ref 27–33)
MCHC RBC AUTO-ENTMCNC: 33.2 G/DL (ref 32–36.5)
MCV RBC AUTO: 81.5 FL (ref 80–96)
PLATELET # BLD AUTO: 401 10^3/UL (ref 150–450)
POTASSIUM SERPL-SCNC: 4.1 MEQ/L (ref 3.5–5.1)
RBC # BLD AUTO: 3.29 10^6/UL (ref 4–5.4)
SODIUM SERPL-SCNC: 132 MEQ/L (ref 136–145)
WBC # BLD AUTO: 11.4 10^3/UL (ref 4–10)

## 2020-05-03 RX ADMIN — APIXABAN SCH MG: 5 TABLET, FILM COATED ORAL at 08:51

## 2020-05-03 RX ADMIN — METOPROLOL TARTRATE SCH MG: 25 TABLET, FILM COATED ORAL at 08:55

## 2020-05-03 RX ADMIN — DEXTROSE MONOHYDRATE SCH MG: 50 INJECTION, SOLUTION INTRAVENOUS at 08:51

## 2020-05-03 RX ADMIN — APIXABAN SCH MG: 5 TABLET, FILM COATED ORAL at 21:01

## 2020-05-03 RX ADMIN — METRONIDAZOLE SCH MLS/HR: 500 INJECTION, SOLUTION INTRAVENOUS at 03:57

## 2020-05-03 RX ADMIN — LEVOTHYROXINE SODIUM SCH MCG: 50 TABLET ORAL at 05:42

## 2020-05-03 RX ADMIN — OMEPRAZOLE SCH MG: 20 CAPSULE, DELAYED RELEASE ORAL at 21:02

## 2020-05-03 RX ADMIN — FUROSEMIDE SCH MG: 10 INJECTION, SOLUTION INTRAMUSCULAR; INTRAVENOUS at 08:51

## 2020-05-03 RX ADMIN — METRONIDAZOLE SCH MG: 500 TABLET ORAL at 21:02

## 2020-05-03 RX ADMIN — OMEPRAZOLE SCH MG: 20 CAPSULE, DELAYED RELEASE ORAL at 13:07

## 2020-05-03 RX ADMIN — CIPROFLOXACIN HYDROCHLORIDE SCH MG: 500 TABLET, FILM COATED ORAL at 17:41

## 2020-05-03 RX ADMIN — METRONIDAZOLE SCH MG: 500 TABLET ORAL at 13:07

## 2020-05-03 RX ADMIN — ATORVASTATIN CALCIUM SCH MG: 20 TABLET, FILM COATED ORAL at 21:02

## 2020-05-03 RX ADMIN — CIPROFLOXACIN SCH MLS/HR: 2 INJECTION, SOLUTION INTRAVENOUS at 05:42

## 2020-05-03 RX ADMIN — SERTRALINE HYDROCHLORIDE SCH MG: 50 TABLET ORAL at 13:07

## 2020-05-03 RX ADMIN — METOPROLOL TARTRATE SCH MG: 25 TABLET, FILM COATED ORAL at 21:00

## 2020-05-04 VITALS — DIASTOLIC BLOOD PRESSURE: 64 MMHG | SYSTOLIC BLOOD PRESSURE: 97 MMHG

## 2020-05-04 VITALS — DIASTOLIC BLOOD PRESSURE: 68 MMHG | SYSTOLIC BLOOD PRESSURE: 100 MMHG

## 2020-05-04 VITALS — SYSTOLIC BLOOD PRESSURE: 163 MMHG | DIASTOLIC BLOOD PRESSURE: 74 MMHG

## 2020-05-04 LAB
BUN SERPL-MCNC: 5 MG/DL (ref 7–18)
CALCIUM SERPL-MCNC: 7.6 MG/DL (ref 8.8–10.2)
CHLORIDE SERPL-SCNC: 96 MEQ/L (ref 98–107)
CO2 SERPL-SCNC: 28 MEQ/L (ref 21–32)
CREAT SERPL-MCNC: 0.33 MG/DL (ref 0.55–1.3)
GFR SERPL CREATININE-BSD FRML MDRD: > 60 ML/MIN/{1.73_M2} (ref 39–?)
GLUCOSE SERPL-MCNC: 77 MG/DL (ref 70–100)
HCT VFR BLD AUTO: 27.2 % (ref 36–47)
HGB BLD-MCNC: 9.3 G/DL (ref 12–15.5)
MCH RBC QN AUTO: 27.6 PG (ref 27–33)
MCHC RBC AUTO-ENTMCNC: 34.2 G/DL (ref 32–36.5)
MCV RBC AUTO: 80.7 FL (ref 80–96)
PLATELET # BLD AUTO: 437 10^3/UL (ref 150–450)
POTASSIUM SERPL-SCNC: 4.2 MEQ/L (ref 3.5–5.1)
RBC # BLD AUTO: 3.37 10^6/UL (ref 4–5.4)
SODIUM SERPL-SCNC: 131 MEQ/L (ref 136–145)
WBC # BLD AUTO: 12.8 10^3/UL (ref 4–10)

## 2020-05-04 RX ADMIN — FUROSEMIDE SCH MG: 10 INJECTION, SOLUTION INTRAMUSCULAR; INTRAVENOUS at 08:30

## 2020-05-04 RX ADMIN — SERTRALINE HYDROCHLORIDE SCH MG: 50 TABLET ORAL at 08:30

## 2020-05-04 RX ADMIN — METOPROLOL TARTRATE SCH MG: 25 TABLET, FILM COATED ORAL at 08:31

## 2020-05-04 RX ADMIN — DEXTROSE MONOHYDRATE SCH MG: 50 INJECTION, SOLUTION INTRAVENOUS at 08:30

## 2020-05-04 RX ADMIN — CIPROFLOXACIN HYDROCHLORIDE SCH MG: 500 TABLET, FILM COATED ORAL at 05:44

## 2020-05-04 RX ADMIN — LEVOTHYROXINE SODIUM SCH MCG: 50 TABLET ORAL at 05:44

## 2020-05-04 RX ADMIN — OMEPRAZOLE SCH MG: 20 CAPSULE, DELAYED RELEASE ORAL at 08:30

## 2020-05-04 RX ADMIN — METRONIDAZOLE SCH MG: 500 TABLET ORAL at 12:40

## 2020-05-04 RX ADMIN — METRONIDAZOLE SCH MG: 500 TABLET ORAL at 05:44

## 2020-05-04 RX ADMIN — APIXABAN SCH MG: 5 TABLET, FILM COATED ORAL at 08:30

## 2020-05-04 NOTE — DS.PDOC
Discharge Summary


General


Date of Admission


Apr 28, 2020 at 19:16


Date of Discharge


May 4, 2020


Primary Care Physician:  Gage Limon


Attending Physician:  GONDAL,KHUBAIB N. MD


Specialist/Consultants Involve:  [facility]





Discharge Summary


PROCEDURES PERFORMED DURING STAY: [None].





ADMITTING DIAGNOSES: 


1. Sepsis 2/2 RLQ abcess


2. Perforated diverticulitis


3. Protein calorie malnutrition


4. Hypovolemic hypotonic hyponatremia


5. Atrial fibrillation


6. Hypothyroidism


7. GERD


8. Depression/anxiety


9. Dementia


10. Hypercholesterolemia





DISCHARGE DIAGNOSES:


1. Sepsis 2/2 RLQ abcess


2. Perforated diverticulitis


3. Protein calorie malnutrition


4. Hypovolemic hypotonic hyponatremia


5. Atrial fibrillation


6. Hypothyroidism


7. GERD


8. Depression/anxiety


9. Dementia


10. Hypercholesterolemia





COMPLICATIONS/CHIEF COMPLAINT: Cva,Perforated Abdominal Viscus.





HISTORY OF PRESENT ILLNESS: Patient is a 80 year old female presenting with 

chief complaint of loss of consciousness. Patient was found by her daughter this

 morning after finding her on the ground. When she found her she was conscious 

and did not remember what happened but thinks she did lose consciousness and hit

 her head on the ground. She states she has had loose stools since last 

Thursday, but thinks there have only been 2/day with decreased PO intake. In the

 ED she was found to be tachycardic with 2+ pitting edema, WBC count of 24, 

hyponatremia, hypokalemia, lactic acidosis, and an elevated BNP. CT head, C-

spine, CXR were negative, with CTabd/pelvis demonstrating microperforation and 

RLQ abdominal abscess. Patient received 1x zosyn and 1.5 liters of fluid res

uscitation.





HOSPITAL COURSE: The patient was admitted with sepsis secondary to 

microperforation of abdominal viscous 2/2 perforated diverticula. She was taken 

to the IR suite where an abscess drain was inserted. This drain eventually put 

out about 140ccs. She was started on empiric antibiotics Ciprofloxacin and 

Flagyl. Her WBC on admission was found to be 24,000 but trended down to 12,800. 

Blood cultures were negative but abscess fluid grew actinomyces israelii, 

bacteriodes ovatus/thetaiotaom, anaerobic cocci. Abdominal abscess gram stain 

grew E. coli, Klebsiella pneumoniae, Strep anginosus. The patient was also found

 to have 3+ pitting edema in bilateral extremities on admission with very low 

albumin. She was started on IV lasix which improved her edema and her BP. She 

was discharged home on 40mg PO lasix daily. 





DISCHARGE MEDICATIONS: Please see below.


 


ALLERGIES: Please see below.





PHYSICAL EXAMINATION ON DISCHARGE:


VITAL SIGNS: Please see below.


GENERAL: No distress


HEENT: Normocephalic, atraumatic, moist mucous membranes


NECK: Supple


CARDIOVASCULAR EXAMINATION: S1, S2, tachycardic


RESPIRATORY EXAMINATION: Clear to auscultation, no wheezing


ABDOMINAL EXAMINATION: Soft, nontender, nondistended, positive bowel sounds, 

abscess drain with small amount of purulent drainage


EXTREMITIES: Range of motion intact


SKIN: No rash


NEUROLOGICAL EXAMINATION: Alert and oriented 3, no focal deficits 


PSYCHIATRIC EXAMINATION: Calm and cooperative





LABORATORY DATA: Please see below.





IMAGING: 





CT BRAIN WITHOUT CONTRAST:


 


HISTORY:  Injury in a fall.


 


COMPARISON STUDY:  August 23, 2019.


 


FINDINGS:   Digital preliminary  radiographs are unremarkable.  Bone window


settings demonstrate an intact bony calvarium.  Visualized paranasal sinuses are


clear.  No intraorbital abnormality is appreciated.


 


On soft tissue window settings, there is mild to moderate generalized volume lo

ss


again noted.  There is evidence of an old right cerebellar infarct superiorly


unchanged.  No acute cortical infarction is seen.  There is no evidence of


intracranial hemorrhage.  No mass, extra-axial fluid collection or midline shift


is seen.


 


IMPRESSION:


 


No acute intracranial abnormality.  Generalized volume loss.  Old right superior


cerebellar infarct.  Otherwise negative.





CT study of the cervical spine without contrast:


 


History: Injury in a fall.


 


Technique:  Helical scanning is acquired and overlapping 2 mm high resolution


axial images were generated and reviewed at bone and soft tissue window 

settings.


Coronal and sagittal multiplanar re-formations images are generated.


 


CT findings:  There is no evidence of cervical spine element fracture.  No skull


base fracture is seen.  Cervical vertebral body heights are preserved.  

Alignment


is normal.  Facet joints are normally aligned bilaterally at each cervical level


on multiplanar re-formations images.  There is no evidence of intraspinal or


paraspinal hematoma.  No extra vertebral abnormality is seen.


 


There is straightening of the normal cervical lordosis.  Degenerative disc


changes are noted most pronounced at C5-6 and C6-7 but also to a lesser degree 

at


C4-5 and C3-4.  There is some degenerative disc calcification at these levels.


Mild osteoarthritic facet hypertrophy and joint space narrowing is seen


bilaterally in the cervical facet joints.  There is a dextroconvex curvature.


 


Impression:


 


Degenerative spondylosis changes.  Straightening and dextroconvex curvature


noted.  Otherwise negative CT study of the cervical spine without contrast.  No


fracture seen.





CHEST, SINGLE VIEW:


 


Single view of the chest is performed and compared to a prior study of


08/23/2019.


 


No acute infiltrate is seen.  There is again elevation of the right


hemidiaphragm.  Patient is rotated toward the right.  Cardiac silhouette is


prominent.  Visualized osseous structures demonstrate no definite fracture.


 


IMPRESSION:


 


No evidence of acute pulmonary disease.





CT ABDOMEN/PELVIS WITH IV CONTRAST:


 


TECHNIQUE:  Axial contrast enhanced images from the lung bases to the pubic


symphysis using 100 mL Isovue-370 intravenous contrast material with multiplanar


reformations.


 


In the visualized lung bases, there are dependent atelectatic changes. There is 

a


small left pleural effusion.


 


There is a moderate to large hiatal hernia.


 


The liver appears unremarkable. Gallbladder is moderately distended with no


definite intraluminal calculus. Spleen is normal in size with no intrinsic


abnormality. There is mild adrenal gland thickening bilaterally. Pancreas


demonstrates no mass. There are cysts of each kidney with no hydronephrosis.


There is no abdominal aortic aneurysm. There is no adenopathy.


 


Scattered free air is seen in the upper abdomen and in the pelvis. There is mild


free fluid in the pelvis. Diffuse colonic diverticulosis is noted. There is


diffuse thickening of the sigmoid colon, compatible with diverticulitis. There 

is


adjacent phlegmonous change with ill-defined air and fluid in the right pelvis


extending superiorly. There is a right lower quadrant air-fluid collection,


consistent with an abscess having a diameter of approximately 9.5 cm. Large


calcified mass anteriorly in the pelvis measures about 5 cm in diameter and


probably represents a calcified uterine fibroid. Metallic prosthesis is noted of


the right hip. There are degenerative changes of the lumbar spine.


 


IMPRESSION:


 


Small left pleural effusion.


 


Scattered free air in the abdomen and pelvis. There appears to be perforated


diverticulitis of the sigmoid with adjacent extraluminal air and fluid and


phlegmonous change in the right pelvis extending superiorly. There is a right


lower quadrant abscess measuring 9.5 cm in diameter. Findings were conveyed to


Dr. Wren in the ER at the time of the exam.


 


Moderate to large hiatal hernia. Moderately distended gallbladder. Calcified


uterine fibroid.





Ultrasound-guided abscess drain


 


The procedure was performed by [facility], under the direct


supervision of Dr. Suarez.


 


The risks and benefits of the procedure were explained to the patient and


informed consent was obtained both verbally and written.  Directly prior to the


start of the procedure, a formal timeout was completed in the procedure room.


 


Under ultrasound guidance, the right lower quadrant abscess was localized and


skin was marked. The skin was then prepped and draped in a sterile fashion.  6 

ml


of buffered lidocaine was used as a local anesthetic.  Using ultrasound 

guidance,


an 10-Canadian multi side-hole the pigtail catheter was inserted using trocar


technique.  120 mL of pus fluid was withdrawn and sent to the lab for further


analysis. A drainage bag was hooked up to the end of the catheter, and the


patient was discharged from the department.














PROGNOSIS: fair





ACTIVITY: [As tolerated].





DIET: low sodium





DISCHARGE PLAN: home





DISPOSITION: .





DISCHARGE INSTRUCTIONS:


1. Please follow up with IR in outpatient clinic within 7 days


2. Please follow up with PCP within 14 days





ITEMS TO FOLLOWUP ON ON OUTPATIENT:


1. none





DISCHARGE CONDITION: [Stable].





TIME SPENT ON DISCHARGE: Greater than 35 minutes.





Attending attestation:


I evaluated and examined the patient in person; I discussed the care with 

Resident in detail and agree with the plan above.





Vital Signs/I&Os





Vital Signs








  Date Time  Temp Pulse Resp B/P (MAP) Pulse Ox O2 Delivery O2 Flow Rate FiO2


 


5/4/20 14:00 98.8 124 16 97/64 (75) 97 Room Air  














I&O- Last 24 Hours up to 6 AM 


 


 5/4/20





 06:00


 


Intake Total 680 ml


 


Balance 680 ml











Laboratory Data


Labs 24H


Laboratory Tests 2


5/4/20 06:30: 


Nucleated Red Blood Cells % (auto) 0.0, Anion Gap 7L, Glomerular Filtration Rate

 > 60.0, Calcium Level 7.6L


CBC/BMP


Laboratory Tests


5/4/20 06:30











Microbiology





Microbiology


4/29/20 Gram Stain - Final, Complete


          


4/29/20 Abscess Culture - Final, Complete


          Escherichia Coli


          Klebsiella Pneumoniae


          Streptococcus Anginosus Grp


4/29/20 Anaerobic Culture - Final, Complete


          Actinomyces Israelii


          Bacteroides Ovatus/Thetaiotaom


          Anaerobic Cocci


4/28/20 Blood Culture - Final, Complete


          NO GROWTH AFTER 5 DAYS


4/28/20 Blood Culture - Final, Complete


          NO GROWTH AFTER 5 DAYS





Discharge Medications


Scheduled


Apixaban (Eliquis) 5 Mg Tablet, 5 MG PO BID, (Reported)


Atenolol (Atenolol) 25 Mg Tablet, 25 MG PO DAILY, (Reported)


Atorvastatin Calcium (Atorvastatin Calcium) 40 Mg Tab, 40 MG PO QHS, (Reported)


Ciprofloxacin HCl (Ciprofloxacin HCl) 500 Mg Tablet, 500 MG PO BID, (Reported)


   FILLED 5/4/20 FOR 10 DAYS 


Donepezil HCl (Donepezil HCl) 10 Mg Tablet, 10 MG PO QHS, (Reported)


Furosemide (Lasix) 40 Mg Tablet, 40 MG PO DAILY, (Reported)


Levothyroxine Sodium (Levothyroxine Sodium) 50 Mcg Tab, 50 MCG PO DAILY, (Repor

catalina)


Metronidazole (Flagyl) 500 Mg Tablet, 500 MG PO Q8H, (Reported)


   FILLED 5/4/20 FOR 10 DAYS 


Multivitamins (Thera M Plus Tablet) 1 Tab Tab, 1 TAB PO DAILY, (Reported)


Omeprazole (Omeprazole) 20 Mg Cap, 20 MG PO BID, (Reported)


Sertraline HCl (Sertraline HCl) 50 Mg Tablet, 50 MG PO DAILY, (Reported)





Allergies


Coded Allergies:  


     No Known Allergies (Unverified , 5/5/20)





GME ATTESTATION


GME ATTESTATION


My faculty preceptor for this patient encounter was physically present during 

the encounter and was fully available. All aspects of the patient interview, 

examination, medical decision making process, and medical care plan development 

were reviewed and approved by the faculty preceptor. The faculty preceptor is 

aware and concurs with the plan as stated in the body of this note and will a

ttest to such by his/her cosignature.











SANA WREN MD               May 4, 2020 15:49


GONDAL,KHUBAIB N. MD            May 5, 2020 19:54

## 2020-05-05 ENCOUNTER — HOSPITAL ENCOUNTER (OUTPATIENT)
Dept: HOSPITAL 53 - M ED | Age: 80
Setting detail: OBSERVATION
LOS: 6 days | Discharge: HOME | End: 2020-05-11
Attending: INTERNAL MEDICINE | Admitting: INTERNAL MEDICINE
Payer: MEDICARE

## 2020-05-05 VITALS — BODY MASS INDEX: 25.52 KG/M2 | WEIGHT: 149.47 LBS | HEIGHT: 64 IN

## 2020-05-05 VITALS — SYSTOLIC BLOOD PRESSURE: 112 MMHG | DIASTOLIC BLOOD PRESSURE: 85 MMHG

## 2020-05-05 VITALS — DIASTOLIC BLOOD PRESSURE: 58 MMHG | SYSTOLIC BLOOD PRESSURE: 119 MMHG

## 2020-05-05 DIAGNOSIS — E03.9: ICD-10-CM

## 2020-05-05 DIAGNOSIS — Z79.01: ICD-10-CM

## 2020-05-05 DIAGNOSIS — I48.91: ICD-10-CM

## 2020-05-05 DIAGNOSIS — F41.9: ICD-10-CM

## 2020-05-05 DIAGNOSIS — E87.6: ICD-10-CM

## 2020-05-05 DIAGNOSIS — R53.81: Primary | ICD-10-CM

## 2020-05-05 DIAGNOSIS — F32.9: ICD-10-CM

## 2020-05-05 DIAGNOSIS — K21.9: ICD-10-CM

## 2020-05-05 DIAGNOSIS — F03.90: ICD-10-CM

## 2020-05-05 DIAGNOSIS — E87.2: ICD-10-CM

## 2020-05-05 DIAGNOSIS — Z11.59: ICD-10-CM

## 2020-05-05 DIAGNOSIS — E87.1: ICD-10-CM

## 2020-05-05 DIAGNOSIS — Q21.1: ICD-10-CM

## 2020-05-05 DIAGNOSIS — E78.2: ICD-10-CM

## 2020-05-05 DIAGNOSIS — E86.0: ICD-10-CM

## 2020-05-05 DIAGNOSIS — Z86.73: ICD-10-CM

## 2020-05-05 DIAGNOSIS — Z79.899: ICD-10-CM

## 2020-05-05 DIAGNOSIS — G31.84: ICD-10-CM

## 2020-05-05 LAB
ALBUMIN SERPL BCG-MCNC: 1.8 GM/DL (ref 3.2–5.2)
ALT SERPL W P-5'-P-CCNC: 27 U/L (ref 12–78)
BASOPHILS # BLD AUTO: 0 10^3/UL (ref 0–0.2)
BASOPHILS NFR BLD AUTO: 0.2 % (ref 0–1)
BILIRUB CONJ SERPL-MCNC: 0.2 MG/DL (ref 0–0.2)
BILIRUB SERPL-MCNC: 0.6 MG/DL (ref 0.2–1)
BUN SERPL-MCNC: 6 MG/DL (ref 7–18)
CALCIUM SERPL-MCNC: 8.1 MG/DL (ref 8.8–10.2)
CHLORIDE SERPL-SCNC: 97 MEQ/L (ref 98–107)
CK MB CFR.DF SERPL CALC: 5.65
CK MB SERPL-MCNC: 1.3 NG/ML (ref ?–3.6)
CK SERPL-CCNC: 23 U/L (ref 26–192)
CO2 SERPL-SCNC: 25 MEQ/L (ref 21–32)
CREAT SERPL-MCNC: 0.4 MG/DL (ref 0.55–1.3)
EOSINOPHIL # BLD AUTO: 0 10^3/UL (ref 0–0.5)
EOSINOPHIL NFR BLD AUTO: 0.1 % (ref 0–3)
GFR SERPL CREATININE-BSD FRML MDRD: > 60 ML/MIN/{1.73_M2} (ref 39–?)
GLUCOSE SERPL-MCNC: 88 MG/DL (ref 70–100)
HCT VFR BLD AUTO: 31.7 % (ref 36–47)
HGB BLD-MCNC: 10.6 G/DL (ref 12–15.5)
LIPASE SERPL-CCNC: 77 U/L (ref 73–393)
LYMPHOCYTES # BLD AUTO: 0.8 10^3/UL (ref 1.5–5)
LYMPHOCYTES NFR BLD AUTO: 3.9 % (ref 24–44)
MCH RBC QN AUTO: 27.3 PG (ref 27–33)
MCHC RBC AUTO-ENTMCNC: 33.4 G/DL (ref 32–36.5)
MCV RBC AUTO: 81.7 FL (ref 80–96)
MONOCYTES # BLD AUTO: 1.2 10^3/UL (ref 0–0.8)
MONOCYTES NFR BLD AUTO: 6.1 % (ref 0–5)
NEUTROPHILS # BLD AUTO: 18.1 10^3/UL (ref 1.5–8.5)
NEUTROPHILS NFR BLD AUTO: 88.7 % (ref 36–66)
PLATELET # BLD AUTO: 427 10^3/UL (ref 150–450)
POTASSIUM SERPL-SCNC: 3.2 MEQ/L (ref 3.5–5.1)
PROT SERPL-MCNC: 6.4 GM/DL (ref 6.4–8.2)
RBC # BLD AUTO: 3.88 10^6/UL (ref 4–5.4)
SODIUM SERPL-SCNC: 129 MEQ/L (ref 136–145)
TROPONIN I SERPL-MCNC: < 0.02 NG/ML (ref ?–0.1)
WBC # BLD AUTO: 20.4 10^3/UL (ref 4–10)

## 2020-05-05 PROCEDURE — 80076 HEPATIC FUNCTION PANEL: CPT

## 2020-05-05 PROCEDURE — 96365 THER/PROPH/DIAG IV INF INIT: CPT

## 2020-05-05 PROCEDURE — 93041 RHYTHM ECG TRACING: CPT

## 2020-05-05 PROCEDURE — 71045 X-RAY EXAM CHEST 1 VIEW: CPT

## 2020-05-05 PROCEDURE — 97161 PT EVAL LOW COMPLEX 20 MIN: CPT

## 2020-05-05 PROCEDURE — 83605 ASSAY OF LACTIC ACID: CPT

## 2020-05-05 PROCEDURE — 84484 ASSAY OF TROPONIN QUANT: CPT

## 2020-05-05 PROCEDURE — 93005 ELECTROCARDIOGRAM TRACING: CPT

## 2020-05-05 PROCEDURE — 97535 SELF CARE MNGMENT TRAINING: CPT

## 2020-05-05 PROCEDURE — 82550 ASSAY OF CK (CPK): CPT

## 2020-05-05 PROCEDURE — 83735 ASSAY OF MAGNESIUM: CPT

## 2020-05-05 PROCEDURE — 96361 HYDRATE IV INFUSION ADD-ON: CPT

## 2020-05-05 PROCEDURE — 99223 1ST HOSP IP/OBS HIGH 75: CPT

## 2020-05-05 PROCEDURE — 85025 COMPLETE CBC W/AUTO DIFF WBC: CPT

## 2020-05-05 PROCEDURE — 97530 THERAPEUTIC ACTIVITIES: CPT

## 2020-05-05 PROCEDURE — 80048 BASIC METABOLIC PNL TOTAL CA: CPT

## 2020-05-05 PROCEDURE — 97116 GAIT TRAINING THERAPY: CPT

## 2020-05-05 PROCEDURE — 94760 N-INVAS EAR/PLS OXIMETRY 1: CPT

## 2020-05-05 PROCEDURE — 99285 EMERGENCY DEPT VISIT HI MDM: CPT

## 2020-05-05 PROCEDURE — 87040 BLOOD CULTURE FOR BACTERIA: CPT

## 2020-05-05 PROCEDURE — 83690 ASSAY OF LIPASE: CPT

## 2020-05-05 PROCEDURE — 82553 CREATINE MB FRACTION: CPT

## 2020-05-05 PROCEDURE — 97165 OT EVAL LOW COMPLEX 30 MIN: CPT

## 2020-05-05 PROCEDURE — 74177 CT ABD & PELVIS W/CONTRAST: CPT

## 2020-05-05 PROCEDURE — 36415 COLL VENOUS BLD VENIPUNCTURE: CPT

## 2020-05-05 PROCEDURE — 85027 COMPLETE CBC AUTOMATED: CPT

## 2020-05-05 PROCEDURE — 84100 ASSAY OF PHOSPHORUS: CPT

## 2020-05-05 RX ADMIN — CIPROFLOXACIN HYDROCHLORIDE SCH MG: 500 TABLET, FILM COATED ORAL at 17:26

## 2020-05-05 RX ADMIN — SERTRALINE HYDROCHLORIDE SCH MG: 50 TABLET ORAL at 17:26

## 2020-05-05 RX ADMIN — METRONIDAZOLE SCH MG: 500 TABLET ORAL at 21:06

## 2020-05-05 RX ADMIN — ATORVASTATIN CALCIUM SCH MG: 20 TABLET, FILM COATED ORAL at 21:07

## 2020-05-05 RX ADMIN — OMEPRAZOLE SCH MG: 20 CAPSULE, DELAYED RELEASE ORAL at 21:07

## 2020-05-05 RX ADMIN — APIXABAN SCH MG: 5 TABLET, FILM COATED ORAL at 21:06

## 2020-05-05 NOTE — HPEPDOC
[facility] Medical History & Physical


Date of Admission


May 5, 2020


Date of Service:  May 5, 2020


Primary Care Physician:  Virginia Benavides


Attending Physician:  GONDAL,KHUBAIB N. MD





History and Physical


PRIMARY CARE PROVIDER:  Virginia Benavides





ATTENDING: Dr. Khubaib Gondal





CHIEF COMPLAINT: Weakness, abdominal pain, hypotension (at home, measured by 

EMS)





HISTORY OF PRESENT ILLNESS: Jalyn Case is a 81 YO F with history of CVA 2/2 PFO, 

recent hospitalization for perforated viscous who presents the day after her 

discharge via EMS for weakness and abdominal pain this morning. Her daughter 

Sailaja (primary caretaker) states that she woke up this morning feeling very 

weak, unable to ascend 4 stairs and get to the bathroom on her own. She was 

found to be very sweaty and complained of severe abdominal pain. On the last 

hospitalization, she presented complaining of a fall and diarrhea and was found 

to have abdominal free air and a 4.9cm abscess in the RLQ that was drained by 

IR. The drain is still in place. She reports feeling "hot" and her abdominal 

pain on presentation is 4/10. She denies any nausea/vomiting or diarrhea at this

time. 








PAST MEDICAL HISTORY: 


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


GERD


Hypothyroidism


Mixed hyperlipidemia


Cognitive impairment


PFO





PAST SURGICAL HISTORY:


Transverse carpal tunnel release 


Left rotator cuff repair


Right total hip arthroplasty


revision of right hip








SOCIAL HISTORY: denies alcohol use, denies use of tobacco products, denies any 

marijuana, heroin, cocaine, or PCP use. 








FAMILY HISTORY: Patient does not recall. 





ALLERGIES: Please see below.





REVIEW OF SYSTEMS:


GENERAL: Reports feeling "hot," chills, recent unexpected weight change, night 

sweats, hemoptysis


HEENT: Denies headache, dizziness, vision changes, hearing loss, sore throat


CARDIOVASCULAR: Denies chest pain, palpitations, orthopnea


RESPIRATORY: Denies shortness of breath, wheezing, cough


GASTROINTESTINAL: denies nausea, vomiting, abdominal pain, constipation, bloody 

stool.


GENITOURINARY: Denies dysuria,urinary urgency, hematuria.


MUSCULOSKELETAL: Denies muscle/joint pain, weakness, stiffness


NEUROLOGICAL: Denies any numbness/tingling, focal weakness, or syncope





HOME MEDICATIONS: Please see below. 





PHYSICAL EXAMINATION:


Vitals: (see below) 


General: Pale appearing female in no acute distress, laying comfortably in bed.


HEENT: Normocephalic, atraumatic. EOMI. No scleral icterus. dry mucous 

membranes. No pharyngeal erythema or uvular deviation. 


Neck: No JVD, lymphadenopathy, or thyromegaly. 


Cardiac: RRR, Normal S1 and S2, No murmurs, gallops, rubs. 


Pulm: Clear to auscultation b/l. Symmetric thorax. No wheezing, crackles, 

rhonchi


Abd: Bowel Sounds present. Abdomen is soft, non-tender, non-distended. No 

guarding, rebound tenderness, or rigidity. No hepatosplenomegaly. No masses or 

eccymosis. Drain is present in RLQ


Ext: 2+ pitting edema up to knees in bilateral LE, no cyanosis


Vascular: Capillary refill ~3seconds


Neuro: No focal neuro deficits. AOx3





LABORATORY DATA: See below.





IMAGING: 





CT ABD/PELVIS WITH IV CONTRAST:


REASON:  Abdominal pain and hypotension.


COMPARISON:  2020


CONTRAST:  100 mL Isovue-370.


There is no change in the lung bases.


The gallbladder is somewhat hydropic, essentially unchanged. The free


intraperitoneal air seen on the prior exam has, for the most part, resolved with


the minimal most residual suspected. The liver and spleen are unchanged. The


pancreas and adrenal glands are unchanged. The left kidney is unchanged and 

again


seen to be within normal limits.


Since the last exam, moderate right-sided hydronephrosis and proximal 

hydroureter


have developed. Within the right ureter at the level of and just distal to the


iliac crest, there is a possible ureterolith. There are extensive soft tissue


calcifications in that region, which are unchanged. There is no change in the


abdominal aorta or para-aortic regions. A few gas- and fluid-filled nondilated


small bowel loops are seen in the abdomen and pelvis. The free pelvic fluid seen


previously has gotten slightly smaller. There is a surgical drainage tube in the


abdomen on the left draining a previously present abscess. The phlegmonous


material seen in that region is much less today. Once again, calcific uterine


myomatous changes are noted, status quo.


There is no change in the osseous structures.


 


IMPRESSION:


1.  Status post surgical drainage tube placement, as described above. There has


been improvement in the appearance of the abscess and phlegmonous material. 

Small


amount of free fluid persists in the pelvis, and there is a tiny amount of


suspected free intraperitoneal air. A mild ileus is suspected.


2. There is new right-sided hydronephrosis and proximal hydroureter, and whether


or not that is secondary to inflammatory change in the lower abdomen and upper


pelvis or secondary to a small ureterolith, cannot be stated with certainty by


this exam. Followup is recommended. Delayed contrast-enhanced imaging may be


helpful if clinically relevant.


3.  Other findings as described above.





 


MICROBIOLOGY: Please see below. 





ASSESSMENT/PLAN: Jalyn Cobb is a 81 YO F with history of CVA and recent 

perforated diverticuli who presents less than 24h after discharge with weakness 

and abdominal pain found to have hypokalemia and lactic acidosis likely 2/2 

dehydration. 





#Lactic acidosis 2/2 dehydration


-S/p 1 dose Ertapenem in ED


-S/p 1L NS in ED, will hold off on any further fluid resuscitation due to her 

elevated R-sided pressures





#?New hydronephrosis found on CT abd/pelvis:


-Will order UA, reflex to culture


-WBC did jump from 12 to 20 today, but as all cell lines increased, likely 2/2 

dehydration





#Recent history perforated viscous 2/2 perforated diverticuli


-Continue Cipro and Flagyl x10 days 


-Will have drain removed by IR tomorrow AM 





#. Hyponatremia


- Likely secondary to decreased PO intake, dehydration  





#. Hypokalemia


- Likely secondary to dehydration and decreased PO intake. 


- Potassium replaced





#. Afib


- Continue Eliquis 


- Continue atenolol for rate control





#. Hypothyroidism


-Continue synthroid





#. GERD


-Continue oral Omeprazole





#. Depression/anxiety


-Continue Sertraline





#. Hypercholesterolemia


- Continue statin





#. Dementia


-Holding home donepezil due to long QT warning from Cipro interaction





-DVT prophy: on Eliquis





Attending attestation:


I evaluated and examined the patient in person; I discussed the care with 

Resident in detail and agree with the plan above.





Vital Signs





Vital Signs








  Date Time  Temp Pulse Resp B/P (MAP) Pulse Ox O2 Delivery O2 Flow Rate FiO2


 


20 14:00  128  101/71 (81) 98   


 


20 09:50 96.8  18   Room Air  











Laboratory Data


Labs 24H


Laboratory Tests 2


20 10:31: 


Immature Granulocyte % (Auto) 1.0, Neutrophils (%) (Auto) 88.7H, Lymphocytes (%)

(Auto) 3.9L, Monocytes (%) (Auto) 6.1H, Eosinophils (%) (Auto) 0.1, Basophils 

(%) (Auto) 0.2, Neutrophils # (Auto) 18.1H, Lymphocytes # (Auto) 0.8L, Monocytes

# (Auto) 1.2H, Eosinophils # (Auto) 0.0, Basophils # (Auto) 0.0, Nucleated Red 

Blood Cells % (auto) 0.0, Anion Gap 7L, Glomerular Filtration Rate > 60.0, 

Lactic Acid Level 2.2*H, Calcium Level 8.1L, Total Bilirubin 0.6, Direct 

Bilirubin 0.2, Aspartate Amino Transf (AST/SGOT) 25, Alanine Aminotransferase 

(ALT/SGPT) 27, Alkaline Phosphatase 92, Total Creatine Kinase 23L, Creatine 

Kinase MB 1.3, Creatine Kinase MB Relative Index 5.65H, Troponin I < 0.02, Total

Protein 6.4, Albumin 1.8L, Albumin/Globulin Ratio 0.39L, Lipase 77


20 14:17: 


CBC/BMP


Laboratory Tests


20 10:31








Microbiology





Microbiology


20 Blood Culture, Received


         Pending


20 Blood Culture, Received


         Pending





Home Medications


Scheduled


Apixaban (Eliquis) 5 Mg Tablet, 5 MG PO BID


Atenolol (Atenolol) 25 Mg Tablet, 25 MG PO DAILY


Atorvastatin Calcium (Atorvastatin Calcium) 40 Mg Tab, 40 MG PO QHS


Ciprofloxacin HCl (Ciprofloxacin HCl) 500 Mg Tablet, 500 MG PO BID


   FILLED 20 FOR 10 DAYS 


Donepezil HCl (Donepezil HCl) 10 Mg Tablet, 10 MG PO QHS


Furosemide (Lasix) 40 Mg Tablet, 40 MG PO DAILY


Levothyroxine Sodium (Levothyroxine Sodium) 50 Mcg Tab, 50 MCG PO DAILY


Metronidazole (Flagyl) 500 Mg Tablet, 500 MG PO Q8H


   FILLED 20 FOR 10 DAYS 


Multivitamins (Thera M Plus Tablet) 1 Tab Tab, 1 TAB PO DAILY


Omeprazole (Omeprazole) 20 Mg Cap, 20 MG PO BID


Sertraline HCl (Sertraline HCl) 50 Mg Tablet, 50 MG PO DAILY





Allergies


Coded Allergies:  


     No Known Allergies (Unverified , 20)





A-FIB/CHADSVASC


A-FIB History


Current/History of A-Fib/PAF?:  Yes


Current PO Anticoag Therapy:  Yes





GME ATTESTATION


GME ATTESTATION


My faculty preceptor for this patient encounter was physically present during 

the encounter and was fully available. All aspects of the patient interview, 

examination, medical decision making process, and medical care plan development 

were reviewed and approved by the faculty preceptor. The faculty preceptor is 

aware and concurs with the plan as stated in the body of this note and will 

attest to such by his/her cosignature.











SANA WREN MD               May 5, 2020 14:33


GONDAL,KHUBAIB N. MD            May 5, 2020 20:08

## 2020-05-05 NOTE — REP
REASON:  Abdominal pain and hypotension.

 

COMPARISON:  04/28/2020

 

CONTRAST:  100 mL Isovue-370.

 

There is no change in the lung bases.

 

The gallbladder is somewhat hydropic, essentially unchanged. The free

intraperitoneal air seen on the prior exam has, for the most part, resolved with

the minimal most residual suspected. The liver and spleen are unchanged. The

pancreas and adrenal glands are unchanged. The left kidney is unchanged and again

seen to be within normal limits.

 

Since the last exam, moderate right-sided hydronephrosis and proximal hydroureter

have developed. Within the right ureter at the level of and just distal to the

iliac crest, there is a possible ureterolith. There are extensive soft tissue

calcifications in that region, which are unchanged. There is no change in the

abdominal aorta or para-aortic regions. A few gas- and fluid-filled nondilated

small bowel loops are seen in the abdomen and pelvis. The free pelvic fluid seen

previously has gotten slightly smaller. There is a surgical drainage tube in the

abdomen on the left draining a previously present abscess. The phlegmonous

material seen in that region is much less today. Once again, calcific uterine

myomatous changes are noted, status quo.

 

There is no change in the osseous structures.

 

IMPRESSION:

 

1.  Status post surgical drainage tube placement, as described above. There has

been improvement in the appearance of the abscess and phlegmonous material. Small

amount of free fluid persists in the pelvis, and there is a tiny amount of

suspected free intraperitoneal air. A mild ileus is suspected.

 

2. There is new right-sided hydronephrosis and proximal hydroureter, and whether

or not that is secondary to inflammatory change in the lower abdomen and upper

pelvis or secondary to a small ureterolith, cannot be stated with certainty by

this exam. Followup is recommended. Delayed contrast-enhanced imaging may be

helpful if clinically relevant.

 

3.  Other findings as described above.

 

 

Electronically Signed by

Won Briceno DO 05/05/2020 12:59 P

## 2020-05-05 NOTE — REP
CHEST, PORTABLE:

 

AP portable view of the chest is performed.

 

There is mild parenchymal opacity in each lung base representing atelectasis

and/or infiltrate. There is a small left pleural effusion.  The heart and

mediastinum are unchanged.

 

IMPRESSION:

 

Mild bibasilar atelectasis/infiltrate.  Small left effusion.

 

 

Electronically Signed by

Kristian Suarez MD 05/05/2020 03:54 P

## 2020-05-06 VITALS — DIASTOLIC BLOOD PRESSURE: 87 MMHG | SYSTOLIC BLOOD PRESSURE: 125 MMHG

## 2020-05-06 VITALS — SYSTOLIC BLOOD PRESSURE: 100 MMHG | DIASTOLIC BLOOD PRESSURE: 69 MMHG

## 2020-05-06 VITALS — DIASTOLIC BLOOD PRESSURE: 83 MMHG | SYSTOLIC BLOOD PRESSURE: 110 MMHG

## 2020-05-06 LAB
BUN SERPL-MCNC: 8 MG/DL (ref 7–18)
CALCIUM SERPL-MCNC: 7.8 MG/DL (ref 8.8–10.2)
CHLORIDE SERPL-SCNC: 100 MEQ/L (ref 98–107)
CO2 SERPL-SCNC: 26 MEQ/L (ref 21–32)
CREAT SERPL-MCNC: 0.47 MG/DL (ref 0.55–1.3)
GFR SERPL CREATININE-BSD FRML MDRD: > 60 ML/MIN/{1.73_M2} (ref 39–?)
GLUCOSE SERPL-MCNC: 75 MG/DL (ref 70–100)
HCT VFR BLD AUTO: 29.4 % (ref 36–47)
HGB BLD-MCNC: 9.6 G/DL (ref 12–15.5)
MAGNESIUM SERPL-MCNC: 1.4 MG/DL (ref 1.8–2.4)
MCH RBC QN AUTO: 26.7 PG (ref 27–33)
MCHC RBC AUTO-ENTMCNC: 32.7 G/DL (ref 32–36.5)
MCV RBC AUTO: 81.9 FL (ref 80–96)
PHOSPHATE SERPL-MCNC: 3.1 MG/DL (ref 2.5–4.9)
PLATELET # BLD AUTO: 476 10^3/UL (ref 150–450)
POTASSIUM SERPL-SCNC: 4.4 MEQ/L (ref 3.5–5.1)
RBC # BLD AUTO: 3.59 10^6/UL (ref 4–5.4)
SODIUM SERPL-SCNC: 132 MEQ/L (ref 136–145)
WBC # BLD AUTO: 21.9 10^3/UL (ref 4–10)

## 2020-05-06 RX ADMIN — APIXABAN SCH MG: 5 TABLET, FILM COATED ORAL at 10:42

## 2020-05-06 RX ADMIN — METRONIDAZOLE SCH MG: 500 TABLET ORAL at 05:52

## 2020-05-06 RX ADMIN — APIXABAN SCH MG: 5 TABLET, FILM COATED ORAL at 20:33

## 2020-05-06 RX ADMIN — ATORVASTATIN CALCIUM SCH MG: 20 TABLET, FILM COATED ORAL at 20:33

## 2020-05-06 RX ADMIN — METRONIDAZOLE SCH MG: 500 TABLET ORAL at 22:21

## 2020-05-06 RX ADMIN — LEVOTHYROXINE SODIUM SCH MCG: 50 TABLET ORAL at 05:52

## 2020-05-06 RX ADMIN — OMEPRAZOLE SCH MG: 20 CAPSULE, DELAYED RELEASE ORAL at 20:33

## 2020-05-06 RX ADMIN — CIPROFLOXACIN HYDROCHLORIDE SCH MG: 500 TABLET, FILM COATED ORAL at 18:43

## 2020-05-06 RX ADMIN — MULTIPLE VITAMINS W/ MINERALS TAB SCH TAB: TAB at 10:42

## 2020-05-06 RX ADMIN — SERTRALINE HYDROCHLORIDE SCH MG: 50 TABLET ORAL at 10:42

## 2020-05-06 RX ADMIN — METRONIDAZOLE SCH MG: 500 TABLET ORAL at 15:25

## 2020-05-06 RX ADMIN — CIPROFLOXACIN HYDROCHLORIDE SCH MG: 500 TABLET, FILM COATED ORAL at 05:52

## 2020-05-06 RX ADMIN — OMEPRAZOLE SCH MG: 20 CAPSULE, DELAYED RELEASE ORAL at 10:42

## 2020-05-06 NOTE — IPNPDOC
Date Seen


The patient was seen on 5/6/20.





Progress Note


SUBJECTIVE:





No issues reported overnight. Patient has no complaints this morning. She denies

any abdominal pain. She feels very weak. She worked with PT today who deemed her

unsafe for discharge at this time. 





OBJECTIVE


PHYSICAL EXAMINATION:


Vitals: (see below) 


General: Pale appearing female in no acute distress, laying comfortably in bed.


HEENT: Normocephalic, atraumatic. EOMI. No scleral icterus. dry mucous 

membranes. No pharyngeal erythema or uvular deviation. 


Neck: No JVD, lymphadenopathy, or thyromegaly. 


Cardiac: RRR, Normal S1 and S2, No murmurs, gallops, rubs. 


Pulm: Clear to auscultation b/l. Symmetric thorax. No wheezing, crackles, 

rhonchi


Abd: Bowel Sounds present. Abdomen is soft, non-tender, non-distended. No 

guarding, rebound tenderness, or rigidity. No hepatosplenomegaly. No masses or 

eccymosis. Drain is present in RLQ


Ext: 2+ pitting edema up to knees in bilateral LE, no cyanosis


Vascular: Capillary refill ~3seconds


Neuro: No focal neuro deficits. AOx3





LABORATORY DATA: See below.


 


MICROBIOLOGY: Please see below. 





ASSESSMENT/PLAN: Jalyn Cobb is a 81 YO F with history of CVA and recent 

perforated diverticuli who presents less than 24h after discharge with weakness 

and abdominal pain found to have hypokalemia and lactic acidosis likely 2/2 

dehydration. 





#Lactic acidosis 2/2 dehydration


-Lactic acid normalized to 1.3


-S/p 1 dose Ertapenem in ED


-S/p 1L NS in ED, will hold off on any further fluid resuscitation due to her 

elevated R-sided pressures





#?New hydronephrosis found on CT abd/pelvis:


-Will order UA, reflex to culture


-WBC 21. Will plan to cover with abx but at this time patient is asymptomatic 

with vitals stable





#Recent history perforated viscous 2/2 perforated diverticuli


-Continue Cipro and Flagyl x10 days 


-Will have drain removed by IR tomorrow  





#. Hyponatremia


- Likely secondary to decreased PO intake, dehydration  





#. Hypokalemia


- Likely secondary to dehydration and decreased PO intake. 


- Potassium replaced





#. Afib


- Continue Eliquis 


- Continue atenolol for rate control





#. Hypothyroidism


-Continue synthroid





#. GERD


-Continue oral Omeprazole





#. Depression/anxiety


-Continue Sertraline





#. Hypercholesterolemia


- Continue statin





#. Dementia


-Holding home donepezil due to long QT warning from Cipro interaction





-DVT prophy: on Eliquis





Attending attestation:


I evaluated and examined the patient in person; I discussed the care with 

Resident in detail and agree with the plan above.





VS, I&O, 24H, Fishbone


Vital Signs/I&O





Vital Signs








  Date Time  Temp Pulse Resp B/P (MAP) Pulse Ox O2 Delivery O2 Flow Rate FiO2


 


5/6/20 14:00 97.2 104 18 100/69 (79) 96 Room Air  














I&O- Last 24 Hours up to 6 AM 


 


 5/6/20





 06:00


 


Intake Total 1350 ml


 


Output Total 320 ml


 


Balance 1030 ml











Laboratory Data


24H LABS


Laboratory Tests 2


5/6/20 07:30: 


Nucleated Red Blood Cells % (auto) 0.0, Anion Gap 6L, Glomerular Filtration Rate

> 60.0, Calcium Level 7.8L, Phosphorus Level 3.1, Magnesium Level 1.4L


CBC/BMP


Laboratory Tests


5/6/20 07:30








Microbiology





Microbiology


5/5/20 Blood Culture - Preliminary, Resulted


         No growth after 24 hours . All specim...


5/5/20 Blood Culture - Preliminary, Resulted


         No growth after 24 hours . All specim...





GME ATTESTATION


GME ATTESTATION


My faculty preceptor for this patient encounter was physically present during 

the encounter and was fully available. All aspects of the patient interview, 

examination, medical decision making process, and medical care plan development 

were reviewed and approved by the faculty preceptor. The faculty preceptor is 

aware and concurs with the plan as stated in the body of this note and will 

attest to such by his/her cosignature.











SANA WREN MD               May 6, 2020 18:54


GONDAL,KHUBAIB N. MD            May 8, 2020 16:15

## 2020-05-06 NOTE — ECGEPIP
[facility] - ED

                                       

                                       Test Date:    2020

Pat Name:     VALENTIN CASTRO                Department:   

Patient ID:   [id number]                 Room:         -

Gender:       Female                   Technician:   

:          [id number]               Requested By: LARY TUCKER

Order Number: [id number]     Reading MD:   Sharmin Dimas

                                 Measurements

Intervals                              Axis          

Rate:         117                      P:            

DC:           0                        QRS:          -8

QRSD:         84                       T:            194

QT:           307                                    

QTc:          429                                    

                           Interpretive Statements

ATRIAL FIBRILLATION WITH RAPID VENTRICULAR RESPONSE

POSSIBLE ANTERIOR MYOCARDIAL INFARCTION, OF INDETERMINATE AGE

NSTTW abnormalities

LOW VOLTAGE LIMB

SIMILAR 20

Electronically Signed on 2020 8:54:09 EDT by Sharmin Dimas

## 2020-05-07 VITALS — SYSTOLIC BLOOD PRESSURE: 113 MMHG | DIASTOLIC BLOOD PRESSURE: 65 MMHG

## 2020-05-07 VITALS — SYSTOLIC BLOOD PRESSURE: 123 MMHG | DIASTOLIC BLOOD PRESSURE: 81 MMHG

## 2020-05-07 VITALS — SYSTOLIC BLOOD PRESSURE: 100 MMHG | DIASTOLIC BLOOD PRESSURE: 70 MMHG

## 2020-05-07 VITALS — SYSTOLIC BLOOD PRESSURE: 109 MMHG | DIASTOLIC BLOOD PRESSURE: 53 MMHG

## 2020-05-07 LAB
BUN SERPL-MCNC: 10 MG/DL (ref 7–18)
CALCIUM SERPL-MCNC: 8 MG/DL (ref 8.8–10.2)
CHLORIDE SERPL-SCNC: 100 MEQ/L (ref 98–107)
CO2 SERPL-SCNC: 27 MEQ/L (ref 21–32)
CREAT SERPL-MCNC: 0.53 MG/DL (ref 0.55–1.3)
GFR SERPL CREATININE-BSD FRML MDRD: > 60 ML/MIN/{1.73_M2} (ref 39–?)
GLUCOSE SERPL-MCNC: 89 MG/DL (ref 70–100)
HCT VFR BLD AUTO: 26.9 % (ref 36–47)
HGB BLD-MCNC: 9.1 G/DL (ref 12–15.5)
MAGNESIUM SERPL-MCNC: 1.6 MG/DL (ref 1.8–2.4)
MCH RBC QN AUTO: 27.7 PG (ref 27–33)
MCHC RBC AUTO-ENTMCNC: 33.8 G/DL (ref 32–36.5)
MCV RBC AUTO: 81.8 FL (ref 80–96)
PLATELET # BLD AUTO: 473 10^3/UL (ref 150–450)
POTASSIUM SERPL-SCNC: 4 MEQ/L (ref 3.5–5.1)
RBC # BLD AUTO: 3.29 10^6/UL (ref 4–5.4)
SODIUM SERPL-SCNC: 134 MEQ/L (ref 136–145)
WBC # BLD AUTO: 13.7 10^3/UL (ref 4–10)

## 2020-05-07 RX ADMIN — METRONIDAZOLE SCH MG: 500 TABLET ORAL at 21:15

## 2020-05-07 RX ADMIN — OMEPRAZOLE SCH MG: 20 CAPSULE, DELAYED RELEASE ORAL at 21:15

## 2020-05-07 RX ADMIN — MAGNESIUM SULFATE IN DEXTROSE SCH MLS/HR: 10 INJECTION, SOLUTION INTRAVENOUS at 10:55

## 2020-05-07 RX ADMIN — LEVOTHYROXINE SODIUM SCH MCG: 50 TABLET ORAL at 05:25

## 2020-05-07 RX ADMIN — SERTRALINE HYDROCHLORIDE SCH MG: 50 TABLET ORAL at 09:53

## 2020-05-07 RX ADMIN — METRONIDAZOLE SCH MG: 500 TABLET ORAL at 14:43

## 2020-05-07 RX ADMIN — METRONIDAZOLE SCH MG: 500 TABLET ORAL at 05:25

## 2020-05-07 RX ADMIN — MAGNESIUM SULFATE IN DEXTROSE SCH MLS/HR: 10 INJECTION, SOLUTION INTRAVENOUS at 09:56

## 2020-05-07 RX ADMIN — ATORVASTATIN CALCIUM SCH MG: 20 TABLET, FILM COATED ORAL at 21:15

## 2020-05-07 RX ADMIN — MAGNESIUM SULFATE IN DEXTROSE SCH MLS/HR: 10 INJECTION, SOLUTION INTRAVENOUS at 12:07

## 2020-05-07 RX ADMIN — OMEPRAZOLE SCH MG: 20 CAPSULE, DELAYED RELEASE ORAL at 09:53

## 2020-05-07 RX ADMIN — MULTIPLE VITAMINS W/ MINERALS TAB SCH TAB: TAB at 09:52

## 2020-05-07 RX ADMIN — APIXABAN SCH MG: 5 TABLET, FILM COATED ORAL at 09:56

## 2020-05-07 RX ADMIN — APIXABAN SCH MG: 5 TABLET, FILM COATED ORAL at 21:15

## 2020-05-07 RX ADMIN — CIPROFLOXACIN HYDROCHLORIDE SCH MG: 500 TABLET, FILM COATED ORAL at 05:25

## 2020-05-07 RX ADMIN — CIPROFLOXACIN HYDROCHLORIDE SCH MG: 500 TABLET, FILM COATED ORAL at 18:20

## 2020-05-07 NOTE — DS.PDOC
Discharge Summary


General


Date of Admission


May 5, 2020 at 14:29


Date of Discharge


May 7, 2020


Primary Care Physician:  Virginia Benavides


Attending Physician:  GONDAL,KHUBAIB N. MD





Discharge Summary


PROCEDURES PERFORMED DURING STAY: [None].





ADMITTING DIAGNOSES: 


1. dehydration


2. Lactic acidosis


3. ?Hydronephrosis





DISCHARGE DIAGNOSES:


1. Deconditioning





COMPLICATIONS/CHIEF COMPLAINT: Dehydration,Hypokalemia.





HISTORY OF PRESENT ILLNESS: Jalyn Cobb is a 81 YO F with history of CVA 2/2 PFO, 

recent hospitalization for perforated viscous who presents the day after her 

discharge via EMS for weakness and abdominal pain this morning. Her daughter 

Sailaja (primary caretaker) states that she woke up this morning feeling very 

weak, unable to ascend 4 stairs and get to the bathroom on her own. She was 

found to be very sweaty and complained of severe abdominal pain. On the last 

hospitalization, she presented complaining of a fall and diarrhea and was found 

to have abdominal free air and a 4.9cm abscess in the RLQ that was drained by 

IR. The drain is still in place. She reports feeling "hot" and her abdominal 

pain on presentation is 4/10. She denies any nausea/vomiting or diarrhea at this

 time. 





HOSPITAL COURSE: The patient was admitted with presumed dehydration and weakness

 2/2 severe protein-calorie malnutrition. Daughter noted she was very weak at ho

me and unable to ascend 4 stairs. The patient had no complaints on admission but

 was found to have lactic acidosis. She was given gentle fluid rescusciation. 

Both antibiotic courses from previous hospitalization were continued. She 

underwent PT/OT evaluation and was determined safe for discharge home with 

services. She was also determined to need a rolling walker for safe ambulation 

at discharge.  





DISCHARGE MEDICATIONS: Please see below.


 


ALLERGIES: Please see below.





PHYSICAL EXAMINATION ON DISCHARGE:


VITAL SIGNS: Please see below.


General: Pale appearing female in no acute distress, laying comfortably in bed.


HEENT: Normocephalic, atraumatic. EOMI. No scleral icterus. dry mucous membran

es. No pharyngeal erythema or uvular deviation. 


Neck: No JVD, lymphadenopathy, or thyromegaly. 


Cardiac: RRR, Normal S1 and S2, No murmurs, gallops, rubs. 


Pulm: Clear to auscultation b/l. Symmetric thorax. No wheezing, crackles, 

rhonchi


Abd: Bowel Sounds present. Abdomen is soft, non-tender, non-distended. No 

guarding, rebound tenderness, or rigidity. No hepatosplenomegaly. No masses or 

eccymosis. Drain is present in RLQ


Ext: 2+ pitting edema up to knees in bilateral LE, no cyanosis


Vascular: Capillary refill ~3seconds


Neuro: No focal neuro deficits. AOx3





LABORATORY DATA: Please see below.





IMAGING: 





CT ABD/PELVIS:


REASON:  Abdominal pain and hypotension.


 


COMPARISON:  04/28/2020


 


CONTRAST:  100 mL Isovue-370.


 


There is no change in the lung bases.


 


The gallbladder is somewhat hydropic, essentially unchanged. The free


intraperitoneal air seen on the prior exam has, for the most part, resolved with


the minimal most residual suspected. The liver and spleen are unchanged. The


pancreas and adrenal glands are unchanged. The left kidney is unchanged and 

again


seen to be within normal limits.


 


Since the last exam, moderate right-sided hydronephrosis and proximal 

hydroureter


have developed. Within the right ureter at the level of and just distal to the


iliac crest, there is a possible ureterolith. There are extensive soft tissue


calcifications in that region, which are unchanged. There is no change in the


abdominal aorta or para-aortic regions. A few gas- and fluid-filled nondilated


small bowel loops are seen in the abdomen and pelvis. The free pelvic fluid seen


previously has gotten slightly smaller. There is a surgical drainage tube in the


abdomen on the left draining a previously present abscess. The phlegmonous


material seen in that region is much less today. Once again, calcific uterine


myomatous changes are noted, status quo.


 


There is no change in the osseous structures.


 


IMPRESSION:


 


1.  Status post surgical drainage tube placement, as described above. There has


been improvement in the appearance of the abscess and phlegmonous material. 

Small


amount of free fluid persists in the pelvis, and there is a tiny amount of


suspected free intraperitoneal air. A mild ileus is suspected.


 


2. There is new right-sided hydronephrosis and proximal hydroureter, and whether


or not that is secondary to inflammatory change in the lower abdomen and upper


pelvis or secondary to a small ureterolith, cannot be stated with certainty by


this exam. Followup is recommended. Delayed contrast-enhanced imaging may be


helpful if clinically relevant.


 


3.  Other findings as described above.


 








PROGNOSIS: fair





ACTIVITY: [As tolerated].





DIET: as tolerated





DISCHARGE PLAN: Home with services





DISPOSITION: .





DISCHARGE INSTRUCTIONS:


1. Follow up with PCP within 7 days





ITEMS TO FOLLOWUP ON ON OUTPATIENT:


None





DISCHARGE CONDITION: [Stable].





TIME SPENT ON DISCHARGE: Greater than 35 minutes.





Attending attestation:


I evaluated and examined the patient in person; I discussed the care with 

Resident in detail and agree with the plan above.





Vital Signs/I&Os





Vital Signs








  Date Time  Temp Pulse Resp B/P (MAP) Pulse Ox O2 Delivery O2 Flow Rate FiO2


 


5/7/20 09:00  90  86/66    


 


5/7/20 08:00        














I&O- Last 24 Hours up to 6 AM 


 


 5/7/20





 05:59


 


Intake Total 950 ml


 


Output Total 500 ml


 


Balance 450 ml











Laboratory Data


Labs 24H


Laboratory Tests 2


5/7/20 05:57: 


Nucleated Red Blood Cells % (auto) 0.0, Anion Gap 7L, Glomerular Filtration Rate

 > 60.0, Calcium Level 8.0L, Magnesium Level 1.6L


CBC/BMP


Laboratory Tests


5/7/20 05:57











Microbiology





Microbiology


5/5/20 Blood Culture - Preliminary, Resulted


         No Growth after 48 hours. All Specime...


5/5/20 Blood Culture - Preliminary, Resulted


         No Growth after 48 hours. All Specime...





Discharge Medications


Scheduled


Apixaban (Eliquis) 5 Mg Tablet, 5 MG PO BID, (Reported)


Atenolol (Atenolol) 25 Mg Tablet, 25 MG PO DAILY, (Reported)


Atorvastatin Calcium (Atorvastatin Calcium) 40 Mg Tab, 40 MG PO QHS, (Reported)


Ciprofloxacin HCl (Ciprofloxacin HCl) 500 Mg Tablet, 500 MG PO BID, (Reported)


   FILLED 5/4/20 FOR 10 DAYS 


Donepezil HCl (Donepezil HCl) 10 Mg Tablet, 10 MG PO QHS, (Reported)


Furosemide (Lasix) 40 Mg Tablet, 40 MG PO DAILY, (Reported)


Levothyroxine Sodium (Levothyroxine Sodium) 50 Mcg Tab, 50 MCG PO DAILY, 

(Reported)


Metronidazole (Flagyl) 500 Mg Tablet, 500 MG PO Q8H, (Reported)


   FILLED 5/4/20 FOR 10 DAYS 


Multivitamins (Thera M Plus Tablet) 1 Tab Tab, 1 TAB PO DAILY, (Reported)


Omeprazole (Omeprazole) 20 Mg Cap, 20 MG PO BID, (Reported)


Sertraline HCl (Sertraline HCl) 50 Mg Tablet, 50 MG PO DAILY, (Reported)





Allergies


Coded Allergies:  


     No Known Allergies (Unverified , 5/5/20)





GME ATTESTATION


GME ATTESTATION


My faculty preceptor for this patient encounter was physically present during 

the encounter and was fully available. All aspects of the patient interview, 

examination, medical decision making process, and medical care plan development 

were reviewed and approved by the faculty preceptor. The faculty preceptor is 

aware and concurs with the plan as stated in the body of this note and will 

attest to such by his/her cosignature.











SANA WREN MD               May 7, 2020 15:09


GONDAL,KHUBAIB N. MD            May 8, 2020 16:24

## 2020-05-08 VITALS — SYSTOLIC BLOOD PRESSURE: 101 MMHG | DIASTOLIC BLOOD PRESSURE: 78 MMHG

## 2020-05-08 VITALS — DIASTOLIC BLOOD PRESSURE: 74 MMHG | SYSTOLIC BLOOD PRESSURE: 113 MMHG

## 2020-05-08 VITALS — SYSTOLIC BLOOD PRESSURE: 124 MMHG | DIASTOLIC BLOOD PRESSURE: 84 MMHG

## 2020-05-08 LAB
BUN SERPL-MCNC: 10 MG/DL (ref 7–18)
CALCIUM SERPL-MCNC: 8.4 MG/DL (ref 8.8–10.2)
CHLORIDE SERPL-SCNC: 98 MEQ/L (ref 98–107)
CO2 SERPL-SCNC: 28 MEQ/L (ref 21–32)
CREAT SERPL-MCNC: 0.52 MG/DL (ref 0.55–1.3)
GFR SERPL CREATININE-BSD FRML MDRD: > 60 ML/MIN/{1.73_M2} (ref 39–?)
GLUCOSE SERPL-MCNC: 92 MG/DL (ref 70–100)
HCT VFR BLD AUTO: 26.1 % (ref 36–47)
HGB BLD-MCNC: 8.8 G/DL (ref 12–15.5)
MAGNESIUM SERPL-MCNC: 1.8 MG/DL (ref 1.8–2.4)
MCH RBC QN AUTO: 27.5 PG (ref 27–33)
MCHC RBC AUTO-ENTMCNC: 33.7 G/DL (ref 32–36.5)
MCV RBC AUTO: 81.6 FL (ref 80–96)
PLATELET # BLD AUTO: 486 10^3/UL (ref 150–450)
POTASSIUM SERPL-SCNC: 3.8 MEQ/L (ref 3.5–5.1)
RBC # BLD AUTO: 3.2 10^6/UL (ref 4–5.4)
SODIUM SERPL-SCNC: 132 MEQ/L (ref 136–145)
WBC # BLD AUTO: 14.6 10^3/UL (ref 4–10)

## 2020-05-08 RX ADMIN — METRONIDAZOLE SCH MG: 500 TABLET ORAL at 14:49

## 2020-05-08 RX ADMIN — MULTIPLE VITAMINS W/ MINERALS TAB SCH TAB: TAB at 10:07

## 2020-05-08 RX ADMIN — CIPROFLOXACIN HYDROCHLORIDE SCH MG: 500 TABLET, FILM COATED ORAL at 05:49

## 2020-05-08 RX ADMIN — ATORVASTATIN CALCIUM SCH MG: 20 TABLET, FILM COATED ORAL at 20:58

## 2020-05-08 RX ADMIN — CIPROFLOXACIN HYDROCHLORIDE SCH MG: 500 TABLET, FILM COATED ORAL at 17:33

## 2020-05-08 RX ADMIN — OMEPRAZOLE SCH MG: 20 CAPSULE, DELAYED RELEASE ORAL at 20:58

## 2020-05-08 RX ADMIN — APIXABAN SCH MG: 5 TABLET, FILM COATED ORAL at 10:07

## 2020-05-08 RX ADMIN — LEVOTHYROXINE SODIUM SCH MCG: 50 TABLET ORAL at 05:49

## 2020-05-08 RX ADMIN — METRONIDAZOLE SCH MG: 500 TABLET ORAL at 21:00

## 2020-05-08 RX ADMIN — METRONIDAZOLE SCH MG: 500 TABLET ORAL at 05:49

## 2020-05-08 RX ADMIN — SERTRALINE HYDROCHLORIDE SCH MG: 50 TABLET ORAL at 10:07

## 2020-05-08 RX ADMIN — APIXABAN SCH MG: 5 TABLET, FILM COATED ORAL at 20:58

## 2020-05-08 RX ADMIN — OMEPRAZOLE SCH MG: 20 CAPSULE, DELAYED RELEASE ORAL at 10:07

## 2020-05-08 NOTE — MHCRPDOC
[facility] Consultation


Consultation


patient not able to be seen tonight for consult due to equipment prioierty from 

ER by report from nursing, will do f2f for capacity eval, from discussion with 

providers, low acuity will try agian tomorrow to arrange for consultation





Vital Signs





Vital Signs








  Date Time  Temp Pulse Resp B/P (MAP) Pulse Ox O2 Delivery O2 Flow Rate FiO2


 


5/8/20 06:00 96.4 105 18 124/84 (97) 96 Room Air  











Laboratory Data


24H Labs


Laboratory Tests 2


5/8/20 05:23: 


Nucleated Red Blood Cells % (auto) 0.0, Anion Gap 6L, Glomerular Filtration Rate

> 60.0, Calcium Level 8.4L, Magnesium Level 1.8





Home Medications


Current Medications





Current Medications








 Medications


  (Trade)  Dose


 Ordered  Sig/Diana


 Route


 PRN Reason  Start Time


 Stop Time Status Last Admin


Dose Admin


 


 Apixaban


  (Eliquis)  5 mg  BID


 PO


   5/5/20 21:00


    5/8/20 10:07





 


 Atenolol


  (Tenormin)  25 mg  DAILY


 PO


   5/5/20 09:00


 5/5/20 16:13 DC  





 


 Atenolol


  (Tenormin)  25 mg  DAILY


 PO


   5/5/20 16:15


    5/6/20 10:44





 


 Atorvastatin


 Calcium


  (Lipitor)  40 mg  QHS


 PO


   5/5/20 21:00


    5/7/20 21:15





 


 Ciprofloxacin


  (Cipro)  500 mg  BID@0600,1800


 PO


   5/5/20 18:00


    5/8/20 05:49





 


 Donepezil HCl


  (AriCEPT)  10 mg  QHS


 PO


   5/5/20 21:00


 5/5/20 15:38 DC  





 


 Home Med


  (Med Rec


 Complete!)    ASDIRECTED


 XX


   5/5/20 11:45


 5/5/20 11:44 DC  





 


 Levothyroxine


 Sodium


  (Synthroid)  50 mcg  DAILY@0600


 PO


   5/6/20 06:00


    5/8/20 05:49





 


 Magnesium Sulfate/


 Dextrose 1 gm/IV


 Miscellaneous


 Supplies  100 ml @ 


 100 mls/hr  Q1H


 IV


   5/7/20 09:00


 5/7/20 11:59 DC 5/7/20 12:07





 


 Metronidazole


  (Flagyl)  500 mg  Q8H


 PO


   5/5/20 22:00


    5/8/20 05:49





 


 Multivitamins


  (Theragram-M)  1 tab  DAILY


 PO


   5/6/20 09:00


    5/8/20 10:07





 


 Omeprazole


  (PriLOSEC)  20 mg  BID


 PO


   5/5/20 21:00


    5/8/20 10:07





 


 Sertraline HCl


  (Zoloft)  50 mg  DAILY


 PO


   5/5/20 09:00


    5/8/20 10:07





 


 Sodium Chloride  1,000 ml @ 


 150 mls/hr  Q6H40M


 IV


   5/5/20 10:30


 5/5/20 15:40 DC 5/5/20 10:50











Scheduled


Apixaban (Eliquis) 5 Mg Tablet, 5 MG PO BID, (Reported)


Atenolol (Atenolol) 25 Mg Tablet, 25 MG PO DAILY, (Reported)


Atorvastatin Calcium (Atorvastatin Calcium) 40 Mg Tab, 40 MG PO QHS, (Reported)


Ciprofloxacin HCl (Ciprofloxacin HCl) 500 Mg Tablet, 500 MG PO BID, (Reported)


   FILLED 5/4/20 FOR 10 DAYS 


Donepezil HCl (Donepezil HCl) 10 Mg Tablet, 10 MG PO QHS, (Reported)


Furosemide (Lasix) 40 Mg Tablet, 40 MG PO DAILY, (Reported)


Levothyroxine Sodium (Levothyroxine Sodium) 50 Mcg Tab, 50 MCG PO DAILY, 

(Reported)


Metronidazole (Flagyl) 500 Mg Tablet, 500 MG PO Q8H, (Reported)


   FILLED 5/4/20 FOR 10 DAYS 


Multivitamins (Thera M Plus Tablet) 1 Tab Tab, 1 TAB PO DAILY, (Reported)


Omeprazole (Omeprazole) 20 Mg Cap, 20 MG PO BID, (Reported)


Sertraline HCl (Sertraline HCl) 50 Mg Tablet, 50 MG PO DAILY, (Reported)





Allergies


Coded Allergies:  


     No Known Allergies (Unverified , 5/5/20)











TK ARCE DO               May 8, 2020 14:09

## 2020-05-08 NOTE — IPNPDOC
Date Seen


The patient was seen on 5/8/20.





Progress Note


SUBJECTIVE:





No issues reported overnight. Patient has no complaints this morning. She denies

any abdominal pain. She feels very weak. Her disposition is yet to be determined

as family does not feel comfortable taking the patient home at this time. 





OBJECTIVE


PHYSICAL EXAMINATION:


Vitals: (see below) 


General: Pale appearing female in no acute distress, laying comfortably in bed.


HEENT: Normocephalic, atraumatic. EOMI. No scleral icterus. dry mucous 

membranes. No pharyngeal erythema or uvular deviation. 


Neck: No JVD, lymphadenopathy, or thyromegaly. 


Cardiac: RRR, Normal S1 and S2, No murmurs, gallops, rubs. 


Pulm: Clear to auscultation b/l. Symmetric thorax. No wheezing, crackles, rhon

chi


Abd: Bowel Sounds present. Abdomen is soft, non-tender, non-distended. No 

guarding, rebound tenderness, or rigidity. No hepatosplenomegaly. No masses or 

eccymosis. Drain is present in RLQ


Ext: 2+ pitting edema up to knees in bilateral LE, no cyanosis


Vascular: Capillary refill ~3seconds


Neuro: No focal neuro deficits. AOx3





LABORATORY DATA: See below.


 


MICROBIOLOGY: Please see below. 





ASSESSMENT/PLAN: Jalyn Cobb is a 79 YO F with history of CVA and recent 

perforated diverticuli who presents less than 24h after discharge with weakness 

and abdominal pain found to have hypokalemia and lactic acidosis likely 2/2 

dehydration. 





#Lactic acidosis 2/2 dehydration


-Lactic acid normalized to 1.3


-S/p 1 dose Ertapenem in ED


-S/p 1L NS in ED, will hold off on any further fluid resuscitation due to her 

elevated R-sided pressures





#?New hydronephrosis found on CT abd/pelvis:


-UA ordered


-WBC 21. Will plan to cover with abx but at this time patient is asymptomatic 

with vitals stable





#Recent history perforated viscous 2/2 perforated diverticuli


-Continue Cipro and Flagyl x10 days 


-drain removed yesterday





#. Hyponatremia


- Likely secondary to decreased PO intake, dehydration  





#. Hypokalemia: resolved


- Likely secondary to dehydration and decreased PO intake. 





#. Afib


- Continue Eliquis 


- Continue atenolol for rate control





#. Hypothyroidism


-Continue synthroid





#. GERD


-Continue oral Omeprazole





#. Depression/anxiety


-Continue Sertraline





#. Hypercholesterolemia


- Continue statin





#. Dementia


-Holding home donepezil due to long QT warning from Cipro interaction





-DVT prophy: on Eliquis





Attending attestation:


I evaluated and examined the patient in person; I discussed the care with 

Resident in detail and agree with the plan above.





VS, I&O, 24H, Fishbone


Vital Signs/I&O





Vital Signs








  Date Time  Temp Pulse Resp B/P (MAP) Pulse Ox O2 Delivery O2 Flow Rate FiO2


 


5/8/20 06:00 96.4 105 18 124/84 (97) 96 Room Air  











l


I&O- Last 24 Hours up to 6 AM 


 


 5/8/20





 06:00


 


Intake Total 1247 ml


 


Output Total 400 ml


 


Balance 847 ml











Laboratory Data


24H LABS


Laboratory Tests 2


5/8/20 05:23: 


Nucleated Red Blood Cells % (auto) 0.0, Anion Gap 6L, Glomerular Filtration Rate

> 60.0, Calcium Level 8.4L, Magnesium Level 1.8


CBC/BMP


Laboratory Tests


5/8/20 05:23








Microbiology





Microbiology


5/5/20 Blood Culture - Preliminary, Resulted


         No Growth after 72 hours. All specime...


5/5/20 Blood Culture - Preliminary, Resulted


         No Growth after 72 hours. All specime...





GME ATTESTATION


GME ATTESTATION


My faculty preceptor for this patient encounter was physically present during 

the encounter and was fully available. All aspects of the patient interview, 

examination, medical decision making process, and medical care plan development 

were reviewed and approved by the faculty preceptor. The faculty preceptor is 

aware and concurs with the plan as stated in the body of this note and will 

attest to such by his/her cosignature.











SANA WREN MD               May 8, 2020 11:48


GONDAL,KHUBAIB N. MD            May 8, 2020 16:30

## 2020-05-09 VITALS — SYSTOLIC BLOOD PRESSURE: 126 MMHG | DIASTOLIC BLOOD PRESSURE: 65 MMHG

## 2020-05-09 VITALS — DIASTOLIC BLOOD PRESSURE: 75 MMHG | SYSTOLIC BLOOD PRESSURE: 113 MMHG

## 2020-05-09 VITALS — SYSTOLIC BLOOD PRESSURE: 119 MMHG | DIASTOLIC BLOOD PRESSURE: 82 MMHG

## 2020-05-09 VITALS — DIASTOLIC BLOOD PRESSURE: 49 MMHG | SYSTOLIC BLOOD PRESSURE: 84 MMHG

## 2020-05-09 RX ADMIN — METRONIDAZOLE SCH MG: 500 TABLET ORAL at 05:43

## 2020-05-09 RX ADMIN — CIPROFLOXACIN HYDROCHLORIDE SCH MG: 500 TABLET, FILM COATED ORAL at 05:43

## 2020-05-09 RX ADMIN — CIPROFLOXACIN HYDROCHLORIDE SCH MG: 500 TABLET, FILM COATED ORAL at 17:09

## 2020-05-09 RX ADMIN — OMEPRAZOLE SCH MG: 20 CAPSULE, DELAYED RELEASE ORAL at 08:28

## 2020-05-09 RX ADMIN — APIXABAN SCH MG: 5 TABLET, FILM COATED ORAL at 21:03

## 2020-05-09 RX ADMIN — ATORVASTATIN CALCIUM SCH MG: 20 TABLET, FILM COATED ORAL at 21:03

## 2020-05-09 RX ADMIN — METRONIDAZOLE SCH MG: 500 TABLET ORAL at 21:03

## 2020-05-09 RX ADMIN — METRONIDAZOLE SCH MG: 500 TABLET ORAL at 14:03

## 2020-05-09 RX ADMIN — MULTIPLE VITAMINS W/ MINERALS TAB SCH TAB: TAB at 08:28

## 2020-05-09 RX ADMIN — APIXABAN SCH MG: 5 TABLET, FILM COATED ORAL at 08:29

## 2020-05-09 RX ADMIN — OMEPRAZOLE SCH MG: 20 CAPSULE, DELAYED RELEASE ORAL at 21:03

## 2020-05-09 RX ADMIN — LEVOTHYROXINE SODIUM SCH MCG: 50 TABLET ORAL at 05:43

## 2020-05-09 RX ADMIN — ACETAMINOPHEN PRN MG: 325 TABLET ORAL at 11:01

## 2020-05-09 RX ADMIN — SERTRALINE HYDROCHLORIDE SCH MG: 50 TABLET ORAL at 08:28

## 2020-05-09 NOTE — IPNPDOC
Date Seen


The patient was seen on 5/9/20.





Progress Note


SUBJECTIVE:





No issues reported overnight. Patient has no complaints this morning. She denies

any abdominal pain. She was evaluated by psychiatry who determined that the 

patient does have capacity to make her own decisions at this time. 





OBJECTIVE


PHYSICAL EXAMINATION:


Vitals: (see below) 


General: Pale appearing female in no acute distress, laying comfortably in bed.


HEENT: Normocephalic, atraumatic. EOMI. No scleral icterus. dry mucous 

membranes. No pharyngeal erythema or uvular deviation. 


Neck: No JVD, lymphadenopathy, or thyromegaly. 


Cardiac: RRR, Normal S1 and S2, No murmurs, gallops, rubs. 


Pulm: Clear to auscultation b/l. Symmetric thorax. No wheezing, crackles, 

rhonchi


Abd: Bowel Sounds present. Abdomen is soft, non-tender, non-distended. No 

guarding, rebound tenderness, or rigidity. No hepatosplenomegaly. No masses or 

eccymosis. Drain is present in RLQ


Ext: 2+ pitting edema up to knees in bilateral LE, no cyanosis


Vascular: Capillary refill ~3seconds


Neuro: No focal neuro deficits. AOx3





LABORATORY DATA: See below.


 


MICROBIOLOGY: Please see below. 





ASSESSMENT/PLAN: Jalyn Cobb is a 81 YO F with history of CVA and recent 

perforated diverticuli who presents less than 24h after discharge with weakness 

and abdominal pain found to have hypokalemia and lactic acidosis likely 2/2 

dehydration. She is now medically stable but disposition pending as there is an 

issue with caretaker feeling unprepared to take her home. 





#Lactic acidosis 2/2 dehydration


-Lactic acid normalized to 1.3


-S/p 1 dose Ertapenem in ED


-S/p 1L NS in ED, will hold off on any further fluid resuscitation due to her 

elevated R-sided pressures





#?New hydronephrosis found on CT abd/pelvis:


-UA ordered


-WBC 21. Will plan to cover with abx but at this time patient is asymptomatic 

with vitals stable





#Recent history perforated viscous 2/2 perforated diverticuli


-Continue Cipro and Flagyl x 10 days 


-drain removed yesterday





#. Hyponatremia


- Likely secondary to decreased PO intake, dehydration  





#. Hypokalemia: resolved


- Likely secondary to dehydration and decreased PO intake. 





#. Afib


- Continue Eliquis 


- Continue atenolol for rate control





#. Hypothyroidism


-Continue synthroid





#. GERD


-Continue oral Omeprazole





#. Depression/anxiety


-Continue Sertraline





#. Hypercholesterolemia


- Continue statin





#. Dementia


-Holding home donepezil due to long QT warning from Cipro interaction





-DVT prophy: on Eliquis





VS, I&O, 24H, Fishbone


Vital Signs/I&O





Vital Signs








  Date Time  Temp Pulse Resp B/P (MAP) Pulse Ox O2 Delivery O2 Flow Rate FiO2


 


5/9/20 08:30  105  119/83    


 


5/9/20 06:00 96.8  18  97 Room Air  














I&O- Last 24 Hours up to 6 AM 


 


 5/9/20





 05:59


 


Intake Total 1137 ml


 


Output Total 0 ml


 


Balance 1137 ml











Laboratory Data


Microbiology





Microbiology


5/5/20 Blood Culture - Preliminary, Resulted


         No Growth after 72 hours. All specime...


5/5/20 Blood Culture - Preliminary, Resulted


         No Growth after 72 hours. All specime...





GME ATTESTATION


GME ATTESTATION


My faculty preceptor for this patient encounter was physically present during 

the encounter and was fully available. All aspects of the patient interview, 

examination, medical decision making process, and medical care plan development 

were reviewed and approved by the faculty preceptor. The faculty preceptor is 

aware and concurs with the plan as stated in the body of this note and will 

attest to such by his/her cosignature.











SANA WREN MD               May 9, 2020 13:08

## 2020-05-09 NOTE — MHCRPDOC
[facility] Consultation


Consultation








Consult 


Jalyn Cobb


MRN: N/A


Date of Birth: N/A


Date of Service: 05/09/2020


Chief Complaint


Consultation for capacity.


History of Present Illness


The patient a 80-year-old woman with a history of reported multiple medical 

problems presents to [facility] after multiple failure to thrive. 

A capacity consult is asked as reportedly her daughter is quite insistent that 

she be evaluated for capacity despite the medical teams belief that she does 

have capacity. It appears that the family had been obstructing her discharge by 

refusing to give the patient her keys as they insist that she needs to go to a 

full term facility such as an SNF. However, PT and OT evaluate the patient and s

he is rated at independent care making her both ineligible and inappropriate for

this.


When I met with the patient she was somewhat surprised about the capacity 

consult, however, she was able to relay parts of her care and her wants and 

needs. She has no history of psychiatric problems or complaints and generally 

had an unremarkable exam.


Review Of Systems


Depression: The patient denies any episodes of unprovoked depressed mood 

associated with neurovegetative symptoms lasting longer than 2 weeks with sympto

ms present nearly everyday.


Anxiety: The patient denies any excessive worry associated with physical 

symptoms. They deny any experience of discreet panic in the past.


Anne: The patient denies any episodes of euphoria/dysphoria associated with 

decreased need for sleep, hedonism, talkatively or impulsivity lasting longer 

than 5 days.


Psychotic: The patient denies any experiences of auditory or visual 

hallucinations. They deny any episodes of paranoia or delusional thinking in the

past


Trauma: The patient denies any traumatic events associated with nightmares or 

intrusive thoughts.


Borderline: The patient screens negative for borderline personality at this 

junction.


Past Psychiatric History


The patient reports no history of psychiatric admissions, medication trials or 

current follow up.


Family Psychiatric History


The patient denies/is unaware any history of mental health history including 

addictions and suicide.


Social History


Grew up in the local area, no history of trauma or abuse. 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

for some time. Currently retired, reports living with her daughter. Prior to 

that had been living alone and prior to that had two husbands that have passed 

away before her.


Medical History


Has a history of cardiovascular mortality and morbidity rate and age-related 

problems. Reported history of dementia but unclear how diagnosis was made.


Allergies


See below


Mental Status Examination


General: Well dressed with good hygiene


Speech: Spontaneous and fluid


Thought processes: Linear and logical


MSK: Smooth and coordinated gait, no signs of tremors or involuntary orofacial 

movements


Thought content: Future orientated


Abstract reasoning, and computation: Intact


Description of associations: Intact


Description of abnormal or psychotic thoughts: Denies any suicidal or homicidal 

ideation. Denies any auditory or visual hallucinations. Does not appear to be 

responding to internal stimuli. Does not appear to be endorsing any bizarre or 

paranoid ideation.


Judgment: fair


Insight: fair


Orientation: Alert and orientated 3


Cognition: Grossly normal


Recent and remote memory: Intact


Attention span and concentration: Intact


Fund of knowledge: Adequate


Mood: "okay"


Affect: Euthymic with a full range


Diagnoses


Evaluation for capacity.


Assessment and Plan


The patient a 80-year-old woman is assessed for capacity at the question/concern

of the family. The primary team believes the patient does have capacity to unde

rstand the risks and benefits of going home alone and given her PT and OT 

evaluations that she is able to take care of herself and that her capacity is 

not in question from their side.


I would agree with the medicines team that she does have capacity for u

nderstanding this particular question. I believe probably the family is not 

understanding a part of her care and that there is not significant reason to 

question her capacity at this time. I had recommended ethics consult due to the 

complicated problem related to the family.


Elements of capacity


1. Communicate a consistent choice: Patient has consistently reported that she 

wants to go home and does not want to go to a Skilled Nursing Facility 

especially if there is no reason.


2. Retain elements of her care: Patient is able to retain basic elements of her 

care as to what she is being treated for and the basic parts of her prognosis.


3. Appreciate the gravity of her situation: The patient is able to appreciate 

the gravity of her care at this time and her aftercare plans in my opinion.


4. Manipulate information rationally: The patient is able to manipulate 

information rationally, plan and abstract situations and able to relay different

hypothetical scenarios and how this would alter her thought process in logical 

ways.


Disposition


No further psychiatric care warranted at this time, recommend ethics consult.


Time Spent


45 minutes.


Saturday





Vital Signs





Vital Signs








  Date Time  Temp Pulse Resp B/P (MAP) Pulse Ox O2 Delivery O2 Flow Rate FiO2


 


5/9/20 08:30  105  119/83    


 


5/9/20 06:00 96.8  18  97 Room Air  











Home Medications


Current Medications





Current Medications








 Medications


  (Trade)  Dose


 Ordered  Sig/Diana


 Route


 PRN Reason  Start Time


 Stop Time Status Last Admin


Dose Admin


 


 Acetaminophen


  (Tylenol Tab)  650 mg  Q6HP  PRN


 PO


 PAIN / FEVER  5/9/20 10:30


     





 


 Apixaban


  (Eliquis)  5 mg  BID


 PO


   5/5/20 21:00


    5/9/20 08:29





 


 Atenolol


  (Tenormin)  25 mg  DAILY


 PO


   5/5/20 09:00


 5/5/20 16:13 DC  





 


 Atenolol


  (Tenormin)  25 mg  DAILY


 PO


   5/5/20 16:15


    5/9/20 08:30





 


 Atorvastatin


 Calcium


  (Lipitor)  40 mg  QHS


 PO


   5/5/20 21:00


    5/8/20 20:58





 


 Ciprofloxacin


  (Cipro)  500 mg  BID@0600,1800


 PO


   5/5/20 18:00


    5/9/20 05:43





 


 Donepezil HCl


  (AriCEPT)  10 mg  QHS


 PO


   5/5/20 21:00


 5/5/20 15:38 DC  





 


 Home Med


  (Med Rec


 Complete!)    ASDIRECTED


 XX


   5/5/20 11:45


 5/5/20 11:44 DC  





 


 Levothyroxine


 Sodium


  (Synthroid)  50 mcg  DAILY@0600


 PO


   5/6/20 06:00


    5/9/20 05:43





 


 Magnesium Sulfate/


 Dextrose 1 gm/IV


 Miscellaneous


 Supplies  100 ml @ 


 100 mls/hr  Q1H


 IV


   5/7/20 09:00


 5/7/20 11:59 DC 5/7/20 12:07





 


 Metronidazole


  (Flagyl)  500 mg  Q8H


 PO


   5/5/20 22:00


    5/9/20 05:43





 


 Multivitamins


  (Theragram-M)  1 tab  DAILY


 PO


   5/6/20 09:00


    5/9/20 08:28





 


 Omeprazole


  (PriLOSEC)  20 mg  BID


 PO


   5/5/20 21:00


    5/9/20 08:28





 


 Sertraline HCl


  (Zoloft)  50 mg  DAILY


 PO


   5/5/20 09:00


    5/9/20 08:28





 


 Sodium Chloride  1,000 ml @ 


 150 mls/hr  Q6H40M


 IV


   5/5/20 10:30


 5/5/20 15:40 DC 5/5/20 10:50











Scheduled


Apixaban (Eliquis) 5 Mg Tablet, 5 MG PO BID, (Reported)


Atenolol (Atenolol) 25 Mg Tablet, 25 MG PO DAILY, (Reported)


Atorvastatin Calcium (Atorvastatin Calcium) 40 Mg Tab, 40 MG PO QHS, (Reported)


Ciprofloxacin HCl (Ciprofloxacin HCl) 500 Mg Tablet, 500 MG PO BID, (Reported)


   FILLED 5/4/20 FOR 10 DAYS 


Donepezil HCl (Donepezil HCl) 10 Mg Tablet, 10 MG PO QHS, (Reported)


Furosemide (Lasix) 40 Mg Tablet, 40 MG PO DAILY, (Reported)


Levothyroxine Sodium (Levothyroxine Sodium) 50 Mcg Tab, 50 MCG PO DAILY, 

(Reported)


Metronidazole (Flagyl) 500 Mg Tablet, 500 MG PO Q8H, (Reported)


   FILLED 5/4/20 FOR 10 DAYS 


Multivitamins (Thera M Plus Tablet) 1 Tab Tab, 1 TAB PO DAILY, (Reported)


Omeprazole (Omeprazole) 20 Mg Cap, 20 MG PO BID, (Reported)


Sertraline HCl (Sertraline HCl) 50 Mg Tablet, 50 MG PO DAILY, (Reported)





Allergies


Coded Allergies:  


     No Known Allergies (Unverified , 5/5/20)











TK ARCE DO               May 9, 2020 10:38

## 2020-05-10 VITALS — DIASTOLIC BLOOD PRESSURE: 62 MMHG | SYSTOLIC BLOOD PRESSURE: 102 MMHG

## 2020-05-10 VITALS — DIASTOLIC BLOOD PRESSURE: 75 MMHG | SYSTOLIC BLOOD PRESSURE: 111 MMHG

## 2020-05-10 VITALS — SYSTOLIC BLOOD PRESSURE: 110 MMHG | DIASTOLIC BLOOD PRESSURE: 72 MMHG

## 2020-05-10 RX ADMIN — OMEPRAZOLE SCH MG: 20 CAPSULE, DELAYED RELEASE ORAL at 21:00

## 2020-05-10 RX ADMIN — SERTRALINE HYDROCHLORIDE SCH MG: 50 TABLET ORAL at 08:19

## 2020-05-10 RX ADMIN — APIXABAN SCH MG: 5 TABLET, FILM COATED ORAL at 21:00

## 2020-05-10 RX ADMIN — APIXABAN SCH MG: 5 TABLET, FILM COATED ORAL at 08:19

## 2020-05-10 RX ADMIN — ACETAMINOPHEN PRN MG: 325 TABLET ORAL at 19:12

## 2020-05-10 RX ADMIN — LEVOTHYROXINE SODIUM SCH MCG: 50 TABLET ORAL at 05:33

## 2020-05-10 RX ADMIN — METRONIDAZOLE SCH MG: 500 TABLET ORAL at 05:32

## 2020-05-10 RX ADMIN — CIPROFLOXACIN HYDROCHLORIDE SCH MG: 500 TABLET, FILM COATED ORAL at 18:19

## 2020-05-10 RX ADMIN — METRONIDAZOLE SCH MG: 500 TABLET ORAL at 15:04

## 2020-05-10 RX ADMIN — ATORVASTATIN CALCIUM SCH MG: 20 TABLET, FILM COATED ORAL at 21:00

## 2020-05-10 RX ADMIN — MULTIPLE VITAMINS W/ MINERALS TAB SCH TAB: TAB at 08:19

## 2020-05-10 RX ADMIN — METRONIDAZOLE SCH MG: 500 TABLET ORAL at 21:00

## 2020-05-10 RX ADMIN — OMEPRAZOLE SCH MG: 20 CAPSULE, DELAYED RELEASE ORAL at 08:19

## 2020-05-10 RX ADMIN — CIPROFLOXACIN HYDROCHLORIDE SCH MG: 500 TABLET, FILM COATED ORAL at 05:33

## 2020-05-10 NOTE — IPNPDOC
Date Seen


The patient was seen on 5/10/20.





Progress Note


SUBJECTIVE: 80-year-old female who was recently admitted for abdominal abscess 

secondary to perforated diverticulitis is readmitted for weakness/dehydration. 

Patient was treated with gentle hydration and continuation of home antibiotics 

with good clinical improvement and return to baseline. Patient has done well for

over 48 hours, discharge planning has been an issue as patient's wife does not 

want her to go home. Physical therapy has evaluated and cleared the patient to 

go home with services. Planning for discharge home once social issue has been 

resolved. Patient comfortable in chair, without any complaints at this time.





PHYSICAL EXAMINATION:


VITAL SIGNS: Please see below.


GENERAL: No distress


HEENT: Normocephalic, atraumatic, moist mucous membranes


NECK: Supple


CARDIOVASCULAR EXAMINATION: S1, S2, no murmurs


RESPIRATORY EXAMINATION: Clear to auscultation, no wheezing


ABDOMINAL EXAMINATION: Soft, nontender, nondistended, positive bowel sounds


EXTREMITIES: Range of motion intact


SKIN: No rash


NEUROLOGICAL EXAMINATION: no focal deficits 


PSYCHIATRIC EXAMINATION: Calm and cooperative





LABORATORY DATA, IMAGING STUDIES, MICROBIOLOGY: Please see below.





ASSESSMENT AND PLAN: 80-year-old female with recent admission for abdominal 

abscess secondary to perforated diverticulitis is readmitted for 

weakness/dehydration.





PROBLEMS:


1. Weakness/dehydration: Treated with gentle IV hydration and physical therapy, 

patient is back to baseline, cleared for discharge home with services by 

physical therapy. Patient's daughter is refusing to take her home as she does 

not feel comfortable, plan for discharge when social issue has been resolved.





2. Abdominal abscess: Secondary to perforated diverticulitis, abdominal drain 

was placed by IR, has not been removed, continue Cipro, Flagyl to complete her 

course.





3. Atrial fibrillation: Continue Eliquis and atenolol





4. Hypothyroidism: Continue levothyroxine





DVT prophylaxis: On Eliquis


GI prophylaxis: PPI





VS, I&O, 24H, Fishbone


Vital Signs/I&O





Vital Signs








  Date Time  Temp Pulse Resp B/P (MAP) Pulse Ox O2 Delivery O2 Flow Rate FiO2


 


5/10/20 14:00 96.7 87 16 110/72 (85) 98 Room Air  














I&O- Last 24 Hours up to 6 AM 


 


 5/10/20





 05:59


 


Intake Total 935 ml


 


Output Total 300 ml


 


Balance 635 ml











Laboratory Data


Microbiology





Microbiology


5/5/20 Blood Culture - Final, Complete


         NO GROWTH AFTER 5 DAYS


5/5/20 Blood Culture - Final, Complete


         NO GROWTH AFTER 5 DAYS











GONDAL,KHUBAIB N. MD           May 10, 2020 19:13

## 2020-05-11 VITALS — DIASTOLIC BLOOD PRESSURE: 63 MMHG | SYSTOLIC BLOOD PRESSURE: 94 MMHG

## 2020-05-11 VITALS — SYSTOLIC BLOOD PRESSURE: 117 MMHG | DIASTOLIC BLOOD PRESSURE: 81 MMHG

## 2020-05-11 LAB
BUN SERPL-MCNC: 9 MG/DL (ref 7–18)
CALCIUM SERPL-MCNC: 8 MG/DL (ref 8.8–10.2)
CHLORIDE SERPL-SCNC: 99 MEQ/L (ref 98–107)
CO2 SERPL-SCNC: 29 MEQ/L (ref 21–32)
CREAT SERPL-MCNC: 0.58 MG/DL (ref 0.55–1.3)
GFR SERPL CREATININE-BSD FRML MDRD: > 60 ML/MIN/{1.73_M2} (ref 39–?)
GLUCOSE SERPL-MCNC: 78 MG/DL (ref 70–100)
HCT VFR BLD AUTO: 29.9 % (ref 36–47)
HGB BLD-MCNC: 9.7 G/DL (ref 12–15.5)
MAGNESIUM SERPL-MCNC: 1.7 MG/DL (ref 1.8–2.4)
MCH RBC QN AUTO: 26.9 PG (ref 27–33)
MCHC RBC AUTO-ENTMCNC: 32.4 G/DL (ref 32–36.5)
MCV RBC AUTO: 83.1 FL (ref 80–96)
PHOSPHATE SERPL-MCNC: 3 MG/DL (ref 2.5–4.9)
PLATELET # BLD AUTO: 512 10^3/UL (ref 150–450)
POTASSIUM SERPL-SCNC: 3.7 MEQ/L (ref 3.5–5.1)
RBC # BLD AUTO: 3.6 10^6/UL (ref 4–5.4)
SODIUM SERPL-SCNC: 134 MEQ/L (ref 136–145)
WBC # BLD AUTO: 12.5 10^3/UL (ref 4–10)

## 2020-05-11 RX ADMIN — LEVOTHYROXINE SODIUM SCH MCG: 50 TABLET ORAL at 05:54

## 2020-05-11 RX ADMIN — CIPROFLOXACIN HYDROCHLORIDE SCH MG: 500 TABLET, FILM COATED ORAL at 05:54

## 2020-05-11 RX ADMIN — SERTRALINE HYDROCHLORIDE SCH MG: 50 TABLET ORAL at 10:00

## 2020-05-11 RX ADMIN — ACETAMINOPHEN PRN MG: 325 TABLET ORAL at 10:11

## 2020-05-11 RX ADMIN — METRONIDAZOLE SCH MG: 500 TABLET ORAL at 05:54

## 2020-05-11 RX ADMIN — MULTIPLE VITAMINS W/ MINERALS TAB SCH TAB: TAB at 10:00

## 2020-05-11 RX ADMIN — METRONIDAZOLE SCH MG: 500 TABLET ORAL at 14:18

## 2020-05-11 RX ADMIN — OMEPRAZOLE SCH MG: 20 CAPSULE, DELAYED RELEASE ORAL at 10:00

## 2020-05-11 RX ADMIN — APIXABAN SCH MG: 5 TABLET, FILM COATED ORAL at 10:01

## 2020-05-12 ENCOUNTER — HOSPITAL ENCOUNTER (INPATIENT)
Dept: HOSPITAL 53 - M ED | Age: 80
LOS: 2 days | Discharge: NURSING, REHAB OR LONG-TERM CARE | DRG: 948 | End: 2020-05-14
Attending: GENERAL PRACTICE | Admitting: GENERAL PRACTICE
Payer: MEDICARE

## 2020-05-12 VITALS — SYSTOLIC BLOOD PRESSURE: 120 MMHG | DIASTOLIC BLOOD PRESSURE: 70 MMHG

## 2020-05-12 VITALS — DIASTOLIC BLOOD PRESSURE: 68 MMHG | SYSTOLIC BLOOD PRESSURE: 116 MMHG

## 2020-05-12 VITALS — HEIGHT: 64 IN | BODY MASS INDEX: 28.19 KG/M2 | WEIGHT: 165.13 LBS

## 2020-05-12 DIAGNOSIS — I95.9: ICD-10-CM

## 2020-05-12 DIAGNOSIS — E03.9: ICD-10-CM

## 2020-05-12 DIAGNOSIS — K44.9: ICD-10-CM

## 2020-05-12 DIAGNOSIS — Z79.01: ICD-10-CM

## 2020-05-12 DIAGNOSIS — R53.1: Primary | ICD-10-CM

## 2020-05-12 DIAGNOSIS — K21.9: ICD-10-CM

## 2020-05-12 DIAGNOSIS — Z86.73: ICD-10-CM

## 2020-05-12 DIAGNOSIS — E78.5: ICD-10-CM

## 2020-05-12 DIAGNOSIS — K57.80: ICD-10-CM

## 2020-05-12 DIAGNOSIS — Z79.899: ICD-10-CM

## 2020-05-12 DIAGNOSIS — Z74.2: ICD-10-CM

## 2020-05-12 DIAGNOSIS — N13.30: ICD-10-CM

## 2020-05-12 DIAGNOSIS — I48.20: ICD-10-CM

## 2020-05-12 DIAGNOSIS — Z96.641: ICD-10-CM

## 2020-05-12 DIAGNOSIS — F03.90: ICD-10-CM

## 2020-05-12 DIAGNOSIS — Q21.1: ICD-10-CM

## 2020-05-12 LAB
ALBUMIN SERPL BCG-MCNC: 2.6 GM/DL (ref 3.2–5.2)
ALT SERPL W P-5'-P-CCNC: 26 U/L (ref 12–78)
BASOPHILS # BLD AUTO: 0 10^3/UL (ref 0–0.2)
BASOPHILS NFR BLD AUTO: 0.2 % (ref 0–1)
BILIRUB CONJ SERPL-MCNC: 0.3 MG/DL (ref 0–0.2)
BILIRUB SERPL-MCNC: 0.6 MG/DL (ref 0.2–1)
BUN SERPL-MCNC: 11 MG/DL (ref 7–18)
CALCIUM SERPL-MCNC: 8.9 MG/DL (ref 8.8–10.2)
CHLORIDE SERPL-SCNC: 97 MEQ/L (ref 98–107)
CO2 SERPL-SCNC: 32 MEQ/L (ref 21–32)
CREAT SERPL-MCNC: 0.74 MG/DL (ref 0.55–1.3)
EOSINOPHIL # BLD AUTO: 0 10^3/UL (ref 0–0.5)
EOSINOPHIL NFR BLD AUTO: 0.1 % (ref 0–3)
GFR SERPL CREATININE-BSD FRML MDRD: > 60 ML/MIN/{1.73_M2} (ref 39–?)
GLUCOSE SERPL-MCNC: 114 MG/DL (ref 70–100)
HCT VFR BLD AUTO: 37.6 % (ref 36–47)
HGB BLD-MCNC: 12.3 G/DL (ref 12–15.5)
LIPASE SERPL-CCNC: 75 U/L (ref 73–393)
LYMPHOCYTES # BLD AUTO: 0.7 10^3/UL (ref 1.5–5)
LYMPHOCYTES NFR BLD AUTO: 4.7 % (ref 24–44)
MCH RBC QN AUTO: 27.8 PG (ref 27–33)
MCHC RBC AUTO-ENTMCNC: 32.7 G/DL (ref 32–36.5)
MCV RBC AUTO: 85.1 FL (ref 80–96)
MONOCYTES # BLD AUTO: 0.7 10^3/UL (ref 0–0.8)
MONOCYTES NFR BLD AUTO: 4.9 % (ref 0–5)
NEUTROPHILS # BLD AUTO: 12.7 10^3/UL (ref 1.5–8.5)
NEUTROPHILS NFR BLD AUTO: 89.7 % (ref 36–66)
PLATELET # BLD AUTO: 590 10^3/UL (ref 150–450)
POTASSIUM SERPL-SCNC: 3.9 MEQ/L (ref 3.5–5.1)
PROT SERPL-MCNC: 7.4 GM/DL (ref 6.4–8.2)
RBC # BLD AUTO: 4.42 10^6/UL (ref 4–5.4)
SODIUM SERPL-SCNC: 134 MEQ/L (ref 136–145)
WBC # BLD AUTO: 14.2 10^3/UL (ref 4–10)

## 2020-05-12 RX ADMIN — OMEPRAZOLE SCH MG: 20 CAPSULE, DELAYED RELEASE ORAL at 21:42

## 2020-05-12 RX ADMIN — ATORVASTATIN CALCIUM SCH MG: 20 TABLET, FILM COATED ORAL at 21:42

## 2020-05-12 RX ADMIN — PROBIOTIC PRODUCT - TAB SCH EA: TAB at 18:00

## 2020-05-12 RX ADMIN — METRONIDAZOLE SCH MLS/HR: 500 INJECTION, SOLUTION INTRAVENOUS at 21:41

## 2020-05-12 RX ADMIN — APIXABAN SCH MG: 5 TABLET, FILM COATED ORAL at 21:41

## 2020-05-12 RX ADMIN — DONEPEZIL HYDROCHLORIDE SCH MG: 5 TABLET, FILM COATED ORAL at 21:42

## 2020-05-12 RX ADMIN — PROBIOTIC PRODUCT - TAB SCH EA: TAB at 21:41

## 2020-05-12 NOTE — REP
REASON:  Diarrhea.

 

COMPARISON:  05/05/2020, 04/28/2020

 

Contrast 100 mL Isovue 370.

 

The lung bases are unchanged.  The liver, gallbladder, spleen, pancreas and

adrenal glands are unchanged. There is right-sided hydronephrosis possible

proximal ureteroliths.  There are pelvic calcifications and vascular

calcifications abutting up against the ureter on the right. This makes assessment

for intraureteral calculi difficult. The kidneys are otherwise unchanged.  The

bowel loops are essentially unchanged.  There is no intestinal obstruction. There

is a hiatal hernia.

 

CT PELVIS:

 

Pelvic calcifications status quo.  Small amount of free pelvic fluid status quo.

This fluid is essentially unchanged from 04/28/2020 as well as 05/05/2020.

Marked spray artifact arising from a right hip prosthesis obscuring pelvic

detail. No change in the abdominal aorta or periaortic regions.  No change in the

osseous structures.

 

IMPRESSION:

 

Right-sided hydronephrosis and possible proximal right ureteroliths.

 

Other findings as described above.  Consider urological consultation.  No abscess

or intestinal obstruction.

 

 

Electronically Signed by

Won Briceno DO 05/12/2020 03:56 P

## 2020-05-12 NOTE — HPE
DATE OF ADMISSION: 2020

 

CHIEF COMPLAINT: Brought in by family as they could not care for her at home.

 

HISTORY OF PRESENTING ILLNESS: This is a 80-year-old female with a history of

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

anticoagulation, reflux, hypothyroidism, admitted 2020 and discharged

05/10/2020 at [facility] due to perforated diverticulitis with abscess. Patient

underwent interventional radiology (IR) drainage and drain was discontinued.

Prior to hospital discharge, she passed physical therapy and was discharged home

to be cared for by her daughter, Sailaja. According to records, as the patient

cannot provide a history, patient was brought into the hospital because her

daughter cannot care for her at home and her son is unable to care for her, so

she was brought in back to the hospital with complaints of generalized weakness,

unable to perform activities of daily living (ADLs). In the emergency room (ER),

she was afebrile, blood pressure was stable at 103 systolic. She had a white

count of 14,000. UA was negative. Repeat CT abdomen and pelvis showed no abscess

or intestinal obstruction, right-sided hydronephrosis with possible proximal

right ureteroliths, which was present from previous CT abdomen and pelvis with

normal creatinine. Hospitalist was called to admit for placement. Patient says

that she has no abdominal pain, fever or chills at home. She is wheelchair bound.

She slept on a chair, unable to care for herself at home and family brought her

in. When asked why she was brought in, she says, "I don't know why I'm here, my

daughter called the ambulance to bring me here." Patient's healthcare proxy,

Sailaja, is unable to be reached. We have called Sailaja Martinez, [phone] three

times with no answer.

 

PAST MEDICAL HISTORY:

Right hydronephrosis.

Perforated viscus secondary to perforated diverticula, diverticular abscess,

status post IR drainage.

Hyponatremia.

Hypokalemia.

Chronic atrial fibrillation.

Hypothyroidism.

History of CVA secondary to patent foramen ovale (PFO).

Reflux.

Hyperlipidemia.

Dementia.

PFO.

 

PAST SURGICAL HISTORY:

Drainage for diverticular abscess.

Right total hip replacement.

Revision of right hip.

 section.

Left rotator cuff repair.

Transverse carpal tunnel release.

 

HOME MEDICATIONS:

- Cipro 500 mg twice a day

- Flagyl 500 mg every 8 hours

- Prilosec 20 mg twice a day

- Zoloft 50 mg daily

- Eliquis 5 mg twice a day

- atenolol 25 mg daily

- Lipitor 40 mg nightly

- donepezil 10 mg daily

- Synthroid 50 mcg daily

- Zoloft 50 mg daily

- Prilosec 20 mg twice a day

 

ALLERGIES: No known drug allergies.

 

FAMILY HISTORY: Noncontributory due to advanced age.

 

SOCIAL HISTORY: No alcohol, tobacco, recreational drug use. The patient says she

lives alone, unable to be cared for by her daughter.

 

CODE STATUS:  Currently FULL CODE.

 

REVIEW OF SYSTEMS: Per history of the present illness. Could not fully obtain

review of systems as the patient is demented.

 

PHYSICAL EXAM:

Temperature 97.9, pulse 89, respiratory rate 18, blood pressure 101/67, 98% on

room air.

Generally the patient is awake, alert, oriented to herself. She is disoriented to

time. She knows that she is at the hospital.

Anicteric sclerae. No jaundice. No pallor. No icterus.

No use of respiratory accessory muscles.

Lungs are clear to auscultation. No wheezing, rales or rhonchi. Air entry is

equal bilaterally.

Heart: S1, S2, currently sinus rhythm. No murmurs, rubs or gallops.

Abdomen is soft, nontender, nondistended. Positive bowel sounds. Right lower

quadrant scar from previous drain placement.

Extremities: Positive pitting edema of 2+.

 

White count 14.2, hemoglobin 12.3, hematocrit 37.6, platelet count 590. Sodium

134, potassium 3.9, chloride 97, bicarbonate 32, BUN 11, creatinine 0.74, glucose

114, lactic acid 1.9, calcium 8.9, total bilirubin 0.6, direct bilirubin 0.3, AST

33, ALT 26, alkaline phosphatase 112, albumin 2.6, lipase 75. BMI 25.6.

 

CT abdomen and pelvis: Right-sided hydronephrosis, kidneys unchanged. No

intestinal obstruction. Hiatal hernia. No abscess.

 

ASSESSMENT AND PLAN: A 80-year-old female with history of patent foramen ovale,

stroke, on chronic Eliquis, diverticular abscess, status post drainage by

interventional radiology, which resolved, hypothyroidism, hyperlipidemia,

dementia, admitted 2020 to 05/10/2020, discharged home yesterday after

passing home safety evaluation. Family brought her in as they are unable to care

for her at home. States that she had been having diarrhea.

 

CURRENT ISSUES:

1. Diarrhea. In light of recent antibiotic use, patient will be checked for

Clostridium difficile (C diff). Gastrointestinal (GI) panel has been sent.

Patient is currently on Bacid one tablet before food and nightly.

2. History of PFO and CVA, on chronic Eliquis.

3. Hypothyroidism, on levothyroxine.

4. Chronic dementia, on Aricept. Unable to care for herself, will need placement.

Patient and family services (PFS) consulted in acute rehab unit (ARU).

5. Dyslipidemia, on Lipitor.

6. History of hiatal hernia, on chronic Prilosec.

7. Diverticular abscess, status post drainage with white count of 14,000. No

drainable abscess on repeat CT. On Cipro, Flagyl.

8. Right-sided hydronephrosis, mild, with normal creatinine. Try IV fluid

hydration.  If acute kidney injury were to occur, will consult urology.

9. Deep vein thrombosis (DVT) prophylaxis, on chronic Eliquis.

 

CODE STATUS:  FULL CODE.  Medical Orders for Life-Sustaining Treatment (MOLST)

form not signed. Patient's healthcare proxy, Sailaja Martinez, could not be reached.

## 2020-05-13 VITALS — DIASTOLIC BLOOD PRESSURE: 61 MMHG | SYSTOLIC BLOOD PRESSURE: 101 MMHG

## 2020-05-13 VITALS — DIASTOLIC BLOOD PRESSURE: 56 MMHG | SYSTOLIC BLOOD PRESSURE: 83 MMHG

## 2020-05-13 VITALS — DIASTOLIC BLOOD PRESSURE: 58 MMHG | SYSTOLIC BLOOD PRESSURE: 102 MMHG

## 2020-05-13 VITALS — SYSTOLIC BLOOD PRESSURE: 92 MMHG | DIASTOLIC BLOOD PRESSURE: 55 MMHG

## 2020-05-13 VITALS — DIASTOLIC BLOOD PRESSURE: 56 MMHG | SYSTOLIC BLOOD PRESSURE: 96 MMHG

## 2020-05-13 VITALS — SYSTOLIC BLOOD PRESSURE: 88 MMHG | DIASTOLIC BLOOD PRESSURE: 52 MMHG

## 2020-05-13 LAB
BASOPHILS # BLD AUTO: 0 10^3/UL (ref 0–0.2)
BASOPHILS NFR BLD AUTO: 0.3 % (ref 0–1)
BUN SERPL-MCNC: 9 MG/DL (ref 7–18)
CALCIUM SERPL-MCNC: 7.6 MG/DL (ref 8.8–10.2)
CHLORIDE SERPL-SCNC: 101 MEQ/L (ref 98–107)
CO2 SERPL-SCNC: 30 MEQ/L (ref 21–32)
CREAT SERPL-MCNC: 0.59 MG/DL (ref 0.55–1.3)
CRP SERPL-MCNC: 2.02 MG/DL (ref 0–0.3)
EOSINOPHIL # BLD AUTO: 0 10^3/UL (ref 0–0.5)
EOSINOPHIL NFR BLD AUTO: 0.3 % (ref 0–3)
ERYTHROCYTE [SEDIMENTATION RATE] IN BLOOD BY WESTERGREN METHOD: 61 MM/HR (ref 0–30)
GFR SERPL CREATININE-BSD FRML MDRD: > 60 ML/MIN/{1.73_M2} (ref 39–?)
GLUCOSE SERPL-MCNC: 80 MG/DL (ref 70–100)
HCT VFR BLD AUTO: 28.1 % (ref 36–47)
HGB BLD-MCNC: 9.2 G/DL (ref 12–15.5)
LYMPHOCYTES # BLD AUTO: 1.5 10^3/UL (ref 1.5–5)
LYMPHOCYTES NFR BLD AUTO: 17 % (ref 24–44)
MCH RBC QN AUTO: 27.4 PG (ref 27–33)
MCHC RBC AUTO-ENTMCNC: 32.7 G/DL (ref 32–36.5)
MCV RBC AUTO: 83.6 FL (ref 80–96)
MONOCYTES # BLD AUTO: 1.1 10^3/UL (ref 0–0.8)
MONOCYTES NFR BLD AUTO: 12.1 % (ref 0–5)
NEUTROPHILS # BLD AUTO: 6.2 10^3/UL (ref 1.5–8.5)
NEUTROPHILS NFR BLD AUTO: 69.6 % (ref 36–66)
PLATELET # BLD AUTO: 462 10^3/UL (ref 150–450)
POTASSIUM SERPL-SCNC: 3.4 MEQ/L (ref 3.5–5.1)
RBC # BLD AUTO: 3.36 10^6/UL (ref 4–5.4)
SODIUM SERPL-SCNC: 138 MEQ/L (ref 136–145)
WBC # BLD AUTO: 9 10^3/UL (ref 4–10)

## 2020-05-13 RX ADMIN — PROBIOTIC PRODUCT - TAB SCH EA: TAB at 13:03

## 2020-05-13 RX ADMIN — ACETAMINOPHEN PRN MG: 325 TABLET ORAL at 06:20

## 2020-05-13 RX ADMIN — APIXABAN SCH MG: 5 TABLET, FILM COATED ORAL at 08:20

## 2020-05-13 RX ADMIN — SERTRALINE HYDROCHLORIDE SCH MG: 50 TABLET ORAL at 08:20

## 2020-05-13 RX ADMIN — METRONIDAZOLE SCH MLS/HR: 500 INJECTION, SOLUTION INTRAVENOUS at 05:41

## 2020-05-13 RX ADMIN — PROBIOTIC PRODUCT - TAB SCH EA: TAB at 20:26

## 2020-05-13 RX ADMIN — OMEPRAZOLE SCH MG: 20 CAPSULE, DELAYED RELEASE ORAL at 20:25

## 2020-05-13 RX ADMIN — CIPROFLOXACIN HYDROCHLORIDE SCH MG: 500 TABLET, FILM COATED ORAL at 17:47

## 2020-05-13 RX ADMIN — OMEPRAZOLE SCH MG: 20 CAPSULE, DELAYED RELEASE ORAL at 08:21

## 2020-05-13 RX ADMIN — ATORVASTATIN CALCIUM SCH MG: 20 TABLET, FILM COATED ORAL at 20:26

## 2020-05-13 RX ADMIN — ACETAMINOPHEN PRN MG: 325 TABLET ORAL at 20:25

## 2020-05-13 RX ADMIN — CIPROFLOXACIN HYDROCHLORIDE SCH MG: 500 TABLET, FILM COATED ORAL at 08:21

## 2020-05-13 RX ADMIN — APIXABAN SCH MG: 5 TABLET, FILM COATED ORAL at 20:25

## 2020-05-13 RX ADMIN — PROBIOTIC PRODUCT - TAB SCH EA: TAB at 17:47

## 2020-05-13 RX ADMIN — LEVOTHYROXINE SODIUM SCH MCG: 50 TABLET ORAL at 05:41

## 2020-05-13 RX ADMIN — DONEPEZIL HYDROCHLORIDE SCH MG: 5 TABLET, FILM COATED ORAL at 20:26

## 2020-05-13 RX ADMIN — PROBIOTIC PRODUCT - TAB SCH EA: TAB at 08:20

## 2020-05-13 RX ADMIN — METRONIDAZOLE SCH MG: 500 TABLET ORAL at 13:03

## 2020-05-13 RX ADMIN — METRONIDAZOLE SCH MG: 500 TABLET ORAL at 20:26

## 2020-05-13 NOTE — IPN
DATE:  05/13/2020

 

Patient denies fevers, chills, abdominal pain, nausea, vomiting, diarrhea,

dysuria, urgency, frequency, hematuria, flank pain, back pain, and says, "I'm

going to go home." Patient's healthcare proxy/daughter was unable to be reached

yesterday after three calls. Contact information is Sailaja Martinez, phone number

[phone]. Patient says that her son told her that he can help her at home.

Patient was afebrile overnight. No other issues. This morning, blood pressure was

83/56. No lightheadedness or dizziness, chest pain, pressure, or tightness. Her

atenolol 25 mg has been held.

 

VITAL SIGNS: Temperature 97.8, pulse 96, respiratory rate 15, blood pressure

92/55, 93% on room air.

GENERAL: Awake, alert, oriented to herself. Answers questions appropriately. No

use of respiratory accessory muscles. No cyanosis. No jugular venous distention

(JVD). No thyromegaly.

LUNGS: Clear to auscultation. No wheezes, rales, or rhonchi.

HEART: S1, S2, sinus rhythm. No murmurs, rubs, or gallops.

ABDOMEN: Soft, nontender, nondistended. Positive bowel sounds. Previous

well-healed scar in the lower right quadrant from previous drain placement.

EXTREMITIES: Pitting edema 2+.

 

LABORATORY DATA: Pending.

 

ASSESSMENT AND PLAN: This is a 80-year-old female with diverticular abscess with

perforated diverticulum, status post drainage by interventional radiology, which

resolved, admitted on May 5 to May 10, discharged home on May 11. Brought in by

the daughter on May 12. Unable to care for her at home. Patient says she had been

having diarrhea but had no bowel movement and currently on no bowel regimen.

Stool is still pending. Urinalysis was negative with negative bacteria, negative

nitrite, 4 white blood cells (WBC). Patient is resumed in Cipro/Flagyl for her

history of diverticular abscess.  Due to recent use of atenolol, patient's blood

pressure was 83/56. She is afebrile. Despite findings of hydronephrosis on the

right kidney, which has been present from early May, patient has had no renal

failure.

 

ACUTE ISSUES:

1. Diarrhea, resolved. We have had no sample during this admission due to active

antibiotic use. We are concerned about Clostridium (C) difficile. Therefore,

gastrointestinal (GI) panel and C. difficile have been ordered. She is currently

on Bacid one tablet before food/at bedtime.

 

2. History of patent foramen ovale (PFO) and cerebrovascular accident (CVA), on

chronic Eliquis, which has been resumed.

 

3. Hypothyroidism, on levothyroxine.

 

4. Diverticular abscess, status post perforation, status post interventional

radiology (IR) drainage. She is on Cipro and Flagyl, changed to oral as she has

no nausea or vomiting.

 

5. Cognitive impairment, on chronic Aricept.

 

6. Dyslipidemia, on Lipitor.

 

7. Hiatal hernia, on chronic Prilosec.

 

8. Mild right hydronephrosis with normal creatinine, status post intravenous

fluids.

 

9. Hypotension. No signs of sepsis at this time. She is afebrile. Urinalysis (UA)

is negative. Chest x-ray is negative. Hold patient's atenolol for now.

 

DISPOSITION: Patient will be placement, as family cannot care for her.

## 2020-05-14 VITALS — SYSTOLIC BLOOD PRESSURE: 106 MMHG | DIASTOLIC BLOOD PRESSURE: 60 MMHG

## 2020-05-14 LAB
BASOPHILS # BLD AUTO: 0 10^3/UL (ref 0–0.2)
BASOPHILS NFR BLD AUTO: 0.3 % (ref 0–1)
BUN SERPL-MCNC: 8 MG/DL (ref 7–18)
CALCIUM SERPL-MCNC: 7.4 MG/DL (ref 8.8–10.2)
CHLORIDE SERPL-SCNC: 102 MEQ/L (ref 98–107)
CO2 SERPL-SCNC: 29 MEQ/L (ref 21–32)
CREAT SERPL-MCNC: 0.59 MG/DL (ref 0.55–1.3)
EOSINOPHIL # BLD AUTO: 0 10^3/UL (ref 0–0.5)
EOSINOPHIL NFR BLD AUTO: 0.3 % (ref 0–3)
GFR SERPL CREATININE-BSD FRML MDRD: > 60 ML/MIN/{1.73_M2} (ref 39–?)
GLUCOSE SERPL-MCNC: 82 MG/DL (ref 70–100)
HCT VFR BLD AUTO: 25.8 % (ref 36–47)
HGB BLD-MCNC: 8.7 G/DL (ref 12–15.5)
LYMPHOCYTES # BLD AUTO: 1.4 10^3/UL (ref 1.5–5)
LYMPHOCYTES NFR BLD AUTO: 15.3 % (ref 24–44)
MCH RBC QN AUTO: 28.1 PG (ref 27–33)
MCHC RBC AUTO-ENTMCNC: 33.7 G/DL (ref 32–36.5)
MCV RBC AUTO: 83.2 FL (ref 80–96)
MONOCYTES # BLD AUTO: 1.1 10^3/UL (ref 0–0.8)
MONOCYTES NFR BLD AUTO: 12.6 % (ref 0–5)
NEUTROPHILS # BLD AUTO: 6.4 10^3/UL (ref 1.5–8.5)
NEUTROPHILS NFR BLD AUTO: 71.1 % (ref 36–66)
PLATELET # BLD AUTO: 433 10^3/UL (ref 150–450)
POTASSIUM SERPL-SCNC: 3.4 MEQ/L (ref 3.5–5.1)
RBC # BLD AUTO: 3.1 10^6/UL (ref 4–5.4)
SODIUM SERPL-SCNC: 136 MEQ/L (ref 136–145)
WBC # BLD AUTO: 9 10^3/UL (ref 4–10)

## 2020-05-14 RX ADMIN — PROBIOTIC PRODUCT - TAB SCH EA: TAB at 07:40

## 2020-05-14 RX ADMIN — CIPROFLOXACIN HYDROCHLORIDE SCH MG: 500 TABLET, FILM COATED ORAL at 05:17

## 2020-05-14 RX ADMIN — SERTRALINE HYDROCHLORIDE SCH MG: 50 TABLET ORAL at 09:17

## 2020-05-14 RX ADMIN — OMEPRAZOLE SCH MG: 20 CAPSULE, DELAYED RELEASE ORAL at 09:16

## 2020-05-14 RX ADMIN — ACETAMINOPHEN PRN MG: 325 TABLET ORAL at 09:16

## 2020-05-14 RX ADMIN — LEVOTHYROXINE SODIUM SCH MCG: 50 TABLET ORAL at 05:16

## 2020-05-14 RX ADMIN — APIXABAN SCH MG: 5 TABLET, FILM COATED ORAL at 09:16

## 2020-05-14 RX ADMIN — METRONIDAZOLE SCH MG: 500 TABLET ORAL at 05:16

## 2020-05-14 RX ADMIN — PROBIOTIC PRODUCT - TAB SCH EA: TAB at 12:30

## 2020-05-15 ENCOUNTER — HOSPITAL ENCOUNTER (OUTPATIENT)
Dept: HOSPITAL 53 - SKLAB3 | Age: 80
End: 2020-05-15
Attending: FAMILY MEDICINE

## 2020-05-15 DIAGNOSIS — D64.9: Primary | ICD-10-CM

## 2020-05-16 ENCOUNTER — HOSPITAL ENCOUNTER (OUTPATIENT)
Dept: HOSPITAL 53 - SKLAB3 | Age: 80
End: 2020-05-16
Attending: FAMILY MEDICINE

## 2020-05-16 DIAGNOSIS — D64.9: Primary | ICD-10-CM

## 2020-05-16 LAB
BASOPHILS # BLD AUTO: 0 10^3/UL (ref 0–0.2)
BASOPHILS NFR BLD AUTO: 0.3 % (ref 0–1)
EOSINOPHIL # BLD AUTO: 0.1 10^3/UL (ref 0–0.5)
EOSINOPHIL NFR BLD AUTO: 0.6 % (ref 0–3)
HCT VFR BLD AUTO: 28.7 % (ref 36–47)
HGB BLD-MCNC: 9.3 G/DL (ref 12–15.5)
LYMPHOCYTES # BLD AUTO: 1.6 10^3/UL (ref 1.5–5)
LYMPHOCYTES NFR BLD AUTO: 16.5 % (ref 24–44)
MCH RBC QN AUTO: 27.2 PG (ref 27–33)
MCHC RBC AUTO-ENTMCNC: 32.4 G/DL (ref 32–36.5)
MCV RBC AUTO: 83.9 FL (ref 80–96)
MONOCYTES # BLD AUTO: 0.9 10^3/UL (ref 0–0.8)
MONOCYTES NFR BLD AUTO: 9 % (ref 0–5)
NEUTROPHILS # BLD AUTO: 7 10^3/UL (ref 1.5–8.5)
NEUTROPHILS NFR BLD AUTO: 73.1 % (ref 36–66)
PLATELET # BLD AUTO: 431 10^3/UL (ref 150–450)
RBC # BLD AUTO: 3.42 10^6/UL (ref 4–5.4)
WBC # BLD AUTO: 9.6 10^3/UL (ref 4–10)

## 2020-05-16 NOTE — DSES
DATE OF ADMISSION:  05/12/2020

DATE OF DISCHARGE:  05/14/2020

 

Patient is discharged to [facility].

 

PRIMARY DISCHARGE DIAGNOSES:

1. Generalized weakness. Family unable to take care of her at home.

2. Diverticular abscess status post perforated diverticulum with drainage by

interventional radiology (IR) from previous admission and discharged on May 11.

3. History of patent foramen ovale and cerebrovascular accident, on chronic

Eliquis.

4. Hypothyroidism.

5. Cognitive impairment.

6. Dyslipidemia.

7. Hiatal hernia.

8. Mild right hydronephrosis with normal creatinine.

Complains of diarrhea but none during this admission.

 

DISCHARGE MEDICATIONS:

- Cipro 500 twice a day

- Flagyl 500 every 8 hours

- Tylenol 650 four times a day as needed

- Eliquis 5 mg twice a day

- atenolol 25 daily

- atorvastatin 40 at bedtime

- donepezil 10 at bedtime

- Lasix 40 daily

- Synthroid 50 mcg daily

- multivitamin one tablet daily

- Prilosec 20 twice a day

- sertraline 50 daily

 

DISCHARGE INSTRUCTIONS: Patient's atenolol is to be held for systolic pressure

less than 100 or heart rate less than 60. Lasix is to be held for systolic

pressure less than 120. Patient is to followup with primary care physician 
within

5-7 days of hospital discharge.

 

HOSPITAL COURSE: This is a 80-year-old female, brought in by family after being

released from the hospital on May 11, when she was treated for a diverticular

abscess by drainage and antibiotics. IR had removed the drain, and she was

discharged home after passing home safety evaluation. Patient's daughter could

not be reached and has been unable to care for her at home. She was then brought

back to the hospital with complaints of generalized weakness. In the emergency

room (ER), she was hemodynamically stable, afebrile. Blood pressure was 103

systolic. Repeat CT abdomen and pelvis shows persistent right-sided

hydronephrosis despite normal creatinine. This was present on previous CT 
abdomen

and pelvis on 05/05/2020. Patient has been sleeping in the chair, unable to care

for herself at home. Family has brought her in asking for placement. Patient was

admitted and placed on IV fluids. White count was 14.2. She was continued on her

home Cipro and Flagyl intravenously. Urinalysis (UA) was unimpressive, nitrite

negative, 4 WBC, and negative bacteria. Patient continued to require held with

her activities of daily living (ADLs) and was subsequently discharged to

[facility].

 

Temperature 98, pulse 114, respiratory rate 17, blood pressure 106/60, 98% on

room air.

GENERAL: Awake, alert, oriented to herself only, answering questions

appropriately.

No jugular venous distention (JVD). No thyromegaly. No cervical lymphadenopathy.

 

LUNGS: Clear to auscultation. No wheezes, rales, or rhonchi.

HEART: S1, S2, sinus tachycardia.

ABDOMEN: Soft, nontender, nondistended. Positive bowel sounds. Right lower

quadrant scar from previous drain.

EXTREMITIES: Pitting edema 2+.

 

LABORATORY DATA ON DISCHARGE:

White count 9, hemoglobin 8.7, hematocrit 25, platelet count 433.

 

Sodium 136, potassium 3.4, chloride 102, bicarbonate 29, BUN 8, creatinine 0.59,

glucose 82.

 

IMAGING STUDY:

On May 5, CT abdomen and pelvis: Right-sided hydronephrosis. Possible proximal

right ureterolith. Other findings as above. Bowel loops are unchanged. No

intestinal obstruction.

 

TIME SPENT ON DISCHARGE: 30 minutes.

MTDD

## 2020-05-20 ENCOUNTER — HOSPITAL ENCOUNTER (INPATIENT)
Dept: HOSPITAL 53 - M ED | Age: 80
LOS: 15 days | Discharge: NURSING, REHAB OR LONG-TERM CARE | DRG: 872 | End: 2020-06-04
Attending: INTERNAL MEDICINE | Admitting: INTERNAL MEDICINE
Payer: MEDICARE

## 2020-05-20 VITALS — DIASTOLIC BLOOD PRESSURE: 59 MMHG | SYSTOLIC BLOOD PRESSURE: 92 MMHG

## 2020-05-20 VITALS — SYSTOLIC BLOOD PRESSURE: 93 MMHG | DIASTOLIC BLOOD PRESSURE: 55 MMHG

## 2020-05-20 VITALS — DIASTOLIC BLOOD PRESSURE: 60 MMHG | SYSTOLIC BLOOD PRESSURE: 87 MMHG

## 2020-05-20 VITALS — SYSTOLIC BLOOD PRESSURE: 83 MMHG | DIASTOLIC BLOOD PRESSURE: 56 MMHG

## 2020-05-20 VITALS — SYSTOLIC BLOOD PRESSURE: 88 MMHG | DIASTOLIC BLOOD PRESSURE: 60 MMHG

## 2020-05-20 VITALS — DIASTOLIC BLOOD PRESSURE: 67 MMHG | SYSTOLIC BLOOD PRESSURE: 94 MMHG

## 2020-05-20 VITALS — SYSTOLIC BLOOD PRESSURE: 84 MMHG | DIASTOLIC BLOOD PRESSURE: 61 MMHG

## 2020-05-20 VITALS — BODY MASS INDEX: 25.07 KG/M2 | WEIGHT: 146.83 LBS | HEIGHT: 64 IN

## 2020-05-20 VITALS — SYSTOLIC BLOOD PRESSURE: 87 MMHG | DIASTOLIC BLOOD PRESSURE: 58 MMHG

## 2020-05-20 VITALS — SYSTOLIC BLOOD PRESSURE: 100 MMHG | DIASTOLIC BLOOD PRESSURE: 60 MMHG

## 2020-05-20 VITALS — DIASTOLIC BLOOD PRESSURE: 55 MMHG | SYSTOLIC BLOOD PRESSURE: 91 MMHG

## 2020-05-20 VITALS — SYSTOLIC BLOOD PRESSURE: 95 MMHG | DIASTOLIC BLOOD PRESSURE: 66 MMHG

## 2020-05-20 VITALS — SYSTOLIC BLOOD PRESSURE: 86 MMHG | DIASTOLIC BLOOD PRESSURE: 50 MMHG

## 2020-05-20 VITALS — DIASTOLIC BLOOD PRESSURE: 55 MMHG | SYSTOLIC BLOOD PRESSURE: 87 MMHG

## 2020-05-20 VITALS — SYSTOLIC BLOOD PRESSURE: 87 MMHG | DIASTOLIC BLOOD PRESSURE: 57 MMHG

## 2020-05-20 VITALS — SYSTOLIC BLOOD PRESSURE: 88 MMHG | DIASTOLIC BLOOD PRESSURE: 57 MMHG

## 2020-05-20 DIAGNOSIS — E87.6: ICD-10-CM

## 2020-05-20 DIAGNOSIS — Z11.59: ICD-10-CM

## 2020-05-20 DIAGNOSIS — E87.2: ICD-10-CM

## 2020-05-20 DIAGNOSIS — Z96.641: ICD-10-CM

## 2020-05-20 DIAGNOSIS — F03.90: ICD-10-CM

## 2020-05-20 DIAGNOSIS — B96.1: ICD-10-CM

## 2020-05-20 DIAGNOSIS — I48.20: ICD-10-CM

## 2020-05-20 DIAGNOSIS — R63.0: ICD-10-CM

## 2020-05-20 DIAGNOSIS — D50.0: ICD-10-CM

## 2020-05-20 DIAGNOSIS — K57.20: ICD-10-CM

## 2020-05-20 DIAGNOSIS — Z79.899: ICD-10-CM

## 2020-05-20 DIAGNOSIS — R55: ICD-10-CM

## 2020-05-20 DIAGNOSIS — R65.20: ICD-10-CM

## 2020-05-20 DIAGNOSIS — N13.30: ICD-10-CM

## 2020-05-20 DIAGNOSIS — Z79.01: ICD-10-CM

## 2020-05-20 DIAGNOSIS — A41.51: Primary | ICD-10-CM

## 2020-05-20 DIAGNOSIS — K21.9: ICD-10-CM

## 2020-05-20 DIAGNOSIS — E87.1: ICD-10-CM

## 2020-05-20 DIAGNOSIS — E83.42: ICD-10-CM

## 2020-05-20 DIAGNOSIS — Z86.73: ICD-10-CM

## 2020-05-20 DIAGNOSIS — E03.9: ICD-10-CM

## 2020-05-20 DIAGNOSIS — E78.5: ICD-10-CM

## 2020-05-20 LAB
ALBUMIN SERPL BCG-MCNC: 2.4 GM/DL (ref 3.2–5.2)
ALT SERPL W P-5'-P-CCNC: 21 U/L (ref 12–78)
AMYLASE SERPL-CCNC: 38 U/L (ref 25–115)
APTT BLD: 34.4 SECONDS (ref 25–38.4)
BILIRUB CONJ SERPL-MCNC: 0.3 MG/DL (ref 0–0.2)
BILIRUB SERPL-MCNC: 0.6 MG/DL (ref 0.2–1)
BUN SERPL-MCNC: 10 MG/DL (ref 7–18)
BUN SERPL-MCNC: 11 MG/DL (ref 7–18)
BUN SERPL-MCNC: 11 MG/DL (ref 7–18)
CALCIUM SERPL-MCNC: 7.3 MG/DL (ref 8.8–10.2)
CALCIUM SERPL-MCNC: 7.5 MG/DL (ref 8.8–10.2)
CALCIUM SERPL-MCNC: 7.8 MG/DL (ref 8.8–10.2)
CHLORIDE SERPL-SCNC: 102 MEQ/L (ref 98–107)
CK MB CFR.DF SERPL CALC: 4.78
CK MB SERPL-MCNC: 1.1 NG/ML (ref ?–3.6)
CK SERPL-CCNC: 23 U/L (ref 26–192)
CO2 SERPL-SCNC: 29 MEQ/L (ref 21–32)
CREAT SERPL-MCNC: 0.55 MG/DL (ref 0.55–1.3)
CREAT SERPL-MCNC: 0.63 MG/DL (ref 0.55–1.3)
CREAT SERPL-MCNC: 0.68 MG/DL (ref 0.55–1.3)
CRP SERPL-MCNC: 8.54 MG/DL (ref 0–0.3)
EOSINOPHIL NFR BLD MANUAL: 1 % (ref 0–3)
GFR SERPL CREATININE-BSD FRML MDRD: > 60 ML/MIN/{1.73_M2} (ref 39–?)
GLUCOSE SERPL-MCNC: 109 MG/DL (ref 70–100)
GLUCOSE SERPL-MCNC: 79 MG/DL (ref 70–100)
GLUCOSE SERPL-MCNC: 99 MG/DL (ref 70–100)
HCT VFR BLD AUTO: 35.8 % (ref 36–47)
HGB BLD-MCNC: 11.3 G/DL (ref 12–15.5)
INR PPP: 1.92
LYMPHOCYTES NFR BLD MANUAL: 5 % (ref 16–44)
MCH RBC QN AUTO: 27 PG (ref 27–33)
MCHC RBC AUTO-ENTMCNC: 31.6 G/DL (ref 32–36.5)
MCV RBC AUTO: 85.6 FL (ref 80–96)
NEUTROPHILS NFR BLD MANUAL: 84 % (ref 28–66)
PLATELET # BLD AUTO: 400 10^3/UL (ref 150–450)
PLATELET BLD QL SMEAR: NORMAL
POTASSIUM SERPL-SCNC: 2.9 MEQ/L (ref 3.5–5.1)
POTASSIUM SERPL-SCNC: 3 MEQ/L (ref 3.5–5.1)
POTASSIUM SERPL-SCNC: 3.5 MEQ/L (ref 3.5–5.1)
PROT SERPL-MCNC: 7.6 GM/DL (ref 6.4–8.2)
PROTHROMBIN TIME: 21.7 SECONDS (ref 11.8–14)
RBC # BLD AUTO: 4.18 10^6/UL (ref 4–5.4)
RBC MORPH BLD: NORMAL
SODIUM SERPL-SCNC: 135 MEQ/L (ref 136–145)
SODIUM SERPL-SCNC: 137 MEQ/L (ref 136–145)
SODIUM SERPL-SCNC: 138 MEQ/L (ref 136–145)
TROPONIN I SERPL-MCNC: < 0.02 NG/ML (ref ?–0.1)
TSH SERPL DL<=0.005 MIU/L-ACNC: 14.1 UIU/ML (ref 0.36–3.74)
WBC # BLD AUTO: 16.9 10^3/UL (ref 4–10)

## 2020-05-20 RX ADMIN — POTASSIUM CHLORIDE SCH MLS/HR: 7.46 INJECTION, SOLUTION INTRAVENOUS at 15:38

## 2020-05-20 RX ADMIN — OMEPRAZOLE SCH MG: 20 CAPSULE, DELAYED RELEASE ORAL at 20:11

## 2020-05-20 RX ADMIN — DEXTROSE MONOHYDRATE SCH MLS/HR: 50 INJECTION, SOLUTION INTRAVENOUS at 15:00

## 2020-05-20 RX ADMIN — POTASSIUM CHLORIDE SCH MLS/HR: 7.46 INJECTION, SOLUTION INTRAVENOUS at 13:29

## 2020-05-20 RX ADMIN — ATORVASTATIN CALCIUM SCH MG: 20 TABLET, FILM COATED ORAL at 20:10

## 2020-05-20 RX ADMIN — POTASSIUM CHLORIDE SCH MLS/HR: 7.46 INJECTION, SOLUTION INTRAVENOUS at 16:00

## 2020-05-20 RX ADMIN — POTASSIUM CHLORIDE AND SODIUM CHLORIDE SCH MLS/HR: 900; 150 INJECTION, SOLUTION INTRAVENOUS at 19:02

## 2020-05-20 NOTE — CR.PDOC
General Surgery Consultation


Date of Consultation


5/20/20





History and Physical


CONSULT REPORT FOR: Dr. Martins





REASON FOR CONSULTATION: recurrent abscess/diverticulitis with abscess





HISTORY OF PRESENT ILLNESS: 





PAST MEDICAL HISTORY:


1. .





PAST SURGICAL HISTORY: INCLUDES:


1. .





PREVIOUS ANESTHESIA REACTIONS: 





ALLERGIES: Please see below.





FAMILY HISTORY: .





HOME MEDICATIONS: Please see below.





REVIEW OF SYSTEMS:


GENERAL: [Denies chills, reports weight gain, reports feeling febrile 

yesterday].


HEENT: [Denies blurred vision and double vision.  Denies ear symptoms. Denies 

hoarseness].


NECK:  Denies any neck pain].


CARDIOVASCULAR: [Denies chest pain and palpitations].


MUSCULOSKELETAL: [Denies arthralgias, back pain and thrombophlebitis].


SKIN: [Denies rash].


NEUROLOGIC: [Denies headache, stroke and transient ischemic attack].


PSYCHIATRIC: [Denies anxiety and depression].


ENDOCRINE: [Denies thyroid disease].


HEMATOLOGY/ONCOLOGY: [Denies bleeding or clotting disorder].


HEART: [Denies any chest pains, palpitations, paroxysmal dyspnea, orthopnea].


PULMONARY: [Denies chronic cough, dyspnea and wheezing].


GASTROINTESTINAL: [Denies rectal bleeding, family history of colon cancer, 

constipation, diarrhea, dysphagia, heartburn and jaundice].


GENITOURINARY: [Denies dysuria, frequency, hematuria and nocturia].


ENDOCRINE: [Denies polydipsia, polyphagia, polyuria, heat or cold intolerance].


INFECTIOUS: [Denies any recent upper respiratory tract infection, UTI, need for 

use of antibiotics].


NUTRITION: [Reports good appetite].





PHYSICAL EXAMINATION:


VITALS SIGNS: Please see below.


GENERAL APPEARANCE:[Patient seen, laying in bed, awake, alert, and oriented. 

Comfortable, in no acute distress].


SKIN: [Warm and moist].


HEENT: [Normocephalic,  atraumatic. Pink palpebral conjunctiva, anicteric 

sclerae. Lips and mucosa appear moist].


NECK: [Supple, no thyromegaly. No obvious jugular venous distention].


LUNGS: [Clear to auscultation bilaterally. No wheezing appreciated].


HEART: [No chest wall abnormalities. Regular rate and rhythm with no murmurs 

appreciated].


ABDOMEN: Abdomen is , soft, . [No hepatosplenomegaly. No umbilical or groin 

herniations, nondistended. No noticeable rebound or guarding. No grimacing with 

palpation. No rebound tenderness. No masses appreciated].


EXTREMITIES: [Extremities have no deformities.  No edema identified]





ANCILLARIES: .





LABORATORY DATA: Please see below.





IMAGING STUDIES: .





IMPRESSION AND PLAN:





Sepsis from intraperitoneal abscess


Acute Diverticulitis vs malignancy





Patient was seen and examined in the ICU. I tried to collate all the information

I could get from her previous admissions as well as from the previous notes from

her primary care provider, Virginia Benavides. Primary problem at this point is the 

recurrent abscess. This was drained back in April and the drain stayed for about

1 week and was removed. Severely 3 weeks after the abscess has recurred almost 

at the same size as the primary abscess. This needs percutaneous drainage was 

more and I suggest not remove the drain in until she is fully worked up with 

regards to the cause of the abscess. I could not find any prior history of 

colonoscopy. Patient is not sure if she has had any prior colonoscopies. Given 

her age, the degree of recurrence likewise patient reporting some weight loss, 

certainly malignancy is a cause of the colon perforation is a strong possibility

especially the fact that there is some seeming involvement/obstruction of the 

right ureter. The first thing that needs to be done as were abscess that needs 

to be drained. I also would suggest testing her urine. The hydronephrosis 

especially if she has some infection associated with her urine maybe also 

causing the sepsis and this may need to be dealt with separately by a 

percutaneous nephrostomy. Suggest consult with urology with regards to the 

hydronephrosis. Patient is not having any peritoneal symptoms or prizing when 

she is not having any abdominal discomfort which leads me to think that there is

some chronicity to this and argues against inflammation or infection is the 

primary cause of the abscess i.e. diverticulitis. After she stabilized either a 

barium enema or a colonoscopy should be considered or both as part of her 

workup. I'll follow the patient up closely..





Vital Signs





Vital Signs








  Date Time  Temp Pulse Resp B/P (MAP) Pulse Ox O2 Delivery O2 Flow Rate FiO2


 


5/20/20 15:17 97.8 101 17 88/60 (69) 95 Room Air  


 


5/20/20 11:45       10.0 











Laboratory Data


Labs 24H


Laboratory Tests 2


5/20/20 12:13: 


Prothrombin Time 21.7H, Prothromb Time International Ratio 1.92, Activated 

Partial Thromboplast Time 34.4, Total Bilirubin 0.6, Direct Bilirubin 0.3H, 

Aspartate Amino Transf (AST/SGOT) 30, Alanine Aminotransferase (ALT/SGPT) 21, 

Alkaline Phosphatase 102, Total Creatine Kinase 23L, Creatine Kinase MB 1.1, 

Creatine Kinase MB Relative Index 4.78H, Troponin I < 0.02, C-Reactive Protein, 

Quantitative 8.54H, Total Protein 7.6, Albumin 2.4L, Albumin/Globulin Ratio 

0.5L, Amylase Level 38, Thyroid Stimulating Hormone (TSH) 14.100H


5/20/20 12:14: 


Neutrophils (%) (Auto) , Nucleated Red Blood Cells % (auto) 0.0, Neutrophils 

84H, Band Neutrophils 10, Lymphocytes (Manual) 5L, Eosinophils (Manual) 1, Red 

Blood Cell Morphology NORMAL, Platelet Estimate NORMAL, POC Glucose (Misc Panel)

112H, POC Sodium (Misc Panel) 137, POC Potassium (Misc Panel) 2.9*L, POC 

Chloride (Misc Panel) 95L, POC Total CO2 (Misc Panel) 28.0H, POC Blood Urea 

Nitrogen (Misc Panel 10, POC Ionized Calcium (Misc Panel) 4.1L, POC Creatinine 

(Misc Panel) 0.7, POC Hematocrit (Misc Panel) 40.0


5/20/20 12:18: POC Lactate (Misc Panel) 4.69*H


5/20/20 14:44: 


Anion Gap 6L, Glomerular Filtration Rate > 60.0, Calcium Level 7.8L


CBC/BMP


Laboratory Tests


5/20/20 12:14








5/20/20 14:44








Microbiology





Microbiology


5/20/20 Blood Culture, Received


          Pending


5/20/20 Blood Culture, Received


          Pending





Home Medications


Scheduled


Apixaban (Eliquis) 5 Mg Tablet, 5 MG PO BID, (Reported)


Atenolol (Atenolol) 25 Mg Tablet, 25 MG PO DAILY, (Reported)


Atorvastatin Calcium (Atorvastatin Calcium) 40 Mg Tab, 40 MG PO QHS, (Reported)


Donepezil HCl (Donepezil HCl) 10 Mg Tablet, 10 MG PO QHS, (Reported)


Furosemide (Lasix) 40 Mg Tablet, 40 MG PO DAILY, (Reported)


Levothyroxine Sodium (Levothyroxine Sodium) 50 Mcg Tab, 50 MCG PO DAILY, 

(Reported)


Multivitamins (Thera M Plus Tablet) 1 Tab Tab, 1 TAB PO DAILY, (Reported)


Omeprazole (Omeprazole) 20 Mg Cap, 20 MG PO BID, (Reported)


Sertraline HCl (Sertraline HCl) 50 Mg Tablet, 50 MG PO DAILY, (Reported)





Scheduled PRN


Acetaminophen (Tylenol) 325 Mg Tablet, 650 MG PO QID PRN for PAIN, (Reported)





Allergies


Coded Allergies:  


     No Known Allergies (Unverified , 5/5/20)











ANANDA RIVAS MD         May 20, 2020 16:26

## 2020-05-20 NOTE — HPEPDOC
General


Date of Admission


20


Date of Service:  May 20, 2020


Chief Complaint


The patient is a 80-year-old female admitted with a reason for visit of Syncope.


Source:  Patient, Old records


Exam Limitations:  Dementia


Timing/Duration:  24 hours


Severity:  Moderate, Severe


Associated Symptoms:  Chills, Hypotension





History of Present Illness


Patient's 80 years old female with past medical history of diverticulitis 

abscess perforation, dementia, hypothyroidism, atrial fibrillation, CVA pre

sented to the hospital after she was found unconsciousness in the nursing home 

in atrial fibrillation with rapid ventricular rate. On arrival patient was in 

rigors with rapid ventricular rate up to 190. Of note patient was recently 

discharged from the hospital after she was found to have perforated viscus 

secondary to perforated diverticular due to diverticulitis, status post IR 

drainage. Patient was discharged from the hospital with course of antibiotics


In emergency room patient was found to have hypotension of 80/40, leukocytosis 

of 16.9, hemoglobin of 11.3, TSH 13.1, K 2.9, lactic acid 4.6. CT of abdomen 

showed significant right kidney hydronephrosis. I talked the radiologist by 

phone, most likely patient developed right kidney hydronephrosis secondary to 

phlegmon.





Home Medications


Scheduled


Apixaban (Eliquis) 5 Mg Tablet, 5 MG PO BID, (Reported)


Atenolol (Atenolol) 25 Mg Tablet, 25 MG PO DAILY, (Reported)


Atorvastatin Calcium (Atorvastatin Calcium) 40 Mg Tab, 40 MG PO QHS, (Reported)


Donepezil HCl (Donepezil HCl) 10 Mg Tablet, 10 MG PO QHS, (Reported)


Furosemide (Lasix) 40 Mg Tablet, 40 MG PO DAILY, (Reported)


Levothyroxine Sodium (Levothyroxine Sodium) 50 Mcg Tab, 50 MCG PO DAILY, 

(Reported)


Multivitamins (Thera M Plus Tablet) 1 Tab Tab, 1 TAB PO DAILY, (Reported)


Omeprazole (Omeprazole) 20 Mg Cap, 20 MG PO BID, (Reported)


Sertraline HCl (Sertraline HCl) 50 Mg Tablet, 50 MG PO DAILY, (Reported)





Scheduled PRN


Acetaminophen (Tylenol) 325 Mg Tablet, 650 MG PO QID PRN for PAIN, (Reported)





Allergies


Coded Allergies:  


     No Known Allergies (Unverified , 20)





Past Medical History


Medical History


Right hydronephrosis.


Perforated viscus secondary to perforated diverticula, diverticular abscess,


status post IR drainage.


Hyponatremia.


Hypokalemia.


Chronic atrial fibrillation.


Hypothyroidism.


History of CVA secondary to patent foramen ovale (PFO).


Reflux.


Hyperlipidemia.


Dementia.


PFO.


Surgical History


Drainage for diverticular abscess.


Right total hip replacement.


Revision of right hip.


 section.


Left rotator cuff repair.


Transverse carpal tunnel release.





Family History


I reviewed family history and found not pertinent





Social History


* Smoker:  Denies


Alcohol:  Denies


Drugs:  denies





A-FIB/CHADSVASC


A-FIB History


Current/History of A-Fib/PAF?:  Yes


Current PO Anticoag Therapy:  Yes





Review of Systems


Constitutional:  Reports: Chills, Malaise, Weakness


Eyes:  Denies: Pain


ENT:  Denies: Head Aches


Skin:  Denies: Rash, Lesions


Pulmonary:  Reports: Dyspnea


Cardiovascular:  Reports: Palpitations; 


   Denies: Chest Pain


Gastrointestinal:  Denies: Nausea, Vomiting


Genitourinary:  Denies: Dysuria, Frequency


Hematologic:  Denies: Bruising


Endocrine:  Denies: Polydipsia, Polyphagia


Musculoskeletal:  Denies: Neck Pain, Back Pain


Neurological:  Denies: Weakness, Numbness


Psych:  Reports: Mood Normal





Physical Examination


General Exam:  Positive: Alert


Eye Exam:  Positive: PERRLA


ENT Exam:  Positive: Atraumatic, Mucous membr. moist/pink


Neck Exam:  Positive: Supple; 


   Negative: JVD


Chest Exam:  Positive: Diminished


Heart Exam:  Positive: Tachycardic


Telemetry:  Positive: Atrial fibrillation


Abdomen Exam:  Positive: BS Hypoactive


Extremity Exam:  Negative: Clubbing, Cyanosis


Skin Exam:  Positive: Nl turgor and temperature


Neuro Exam:  Positive: Strength at 5/5 X4 ext, Cranial Nerves 3-12 NL


Psych Exam:  Positive: Other (demented)





Vital Signs





Vital Signs








  Date Time  Temp Pulse Resp B/P (MAP) Pulse Ox O2 Delivery O2 Flow Rate FiO2


 


20 12:27  110      


 


20 11:45 97.2  28 110/66 (81) 97 Non-Rebreather 10.0 











Laboratory Data


Labs 24H


Laboratory Tests 2


20 12:13: 


Prothrombin Time 21.7H, Prothromb Time International Ratio 1.92, Activated 

Partial Thromboplast Time 34.4, Total Bilirubin 0.6, Direct Bilirubin 0.3H, 

Aspartate Amino Transf (AST/SGOT) 30, Alanine Aminotransferase (ALT/SGPT) 21, 

Alkaline Phosphatase 102, Total Creatine Kinase 23L, Creatine Kinase MB 1.1, 

Creatine Kinase MB Relative Index 4.78H, Troponin I < 0.02, C-Reactive Protein, 

Quantitative 8.54H, Total Protein 7.6, Albumin 2.4L, Albumin/Globulin Ratio 

0.5L, Amylase Level 38, Thyroid Stimulating Hormone (TSH) 14.100H


20 12:14: 


Neutrophils (%) (Auto) , Nucleated Red Blood Cells % (auto) 0.0, Neutrophils 

84H, Band Neutrophils 10, Lymphocytes (Manual) 5L, Eosinophils (Manual) 1, Red 

Blood Cell Morphology NORMAL, Platelet Estimate NORMAL, POC Glucose (Misc Panel)

112H, POC Sodium (Misc Panel) 137, POC Potassium (Misc Panel) 2.9*L, POC 

Chloride (Misc Panel) 95L, POC Total CO2 (Misc Panel) 28.0H, POC Blood Urea 

Nitrogen (Misc Panel 10, POC Ionized Calcium (Misc Panel) 4.1L, POC Creatinine 

(Misc Panel) 0.7, POC Hematocrit (Misc Panel) 40.0


20 12:18: POC Lactate (Misc Panel) 4.69*H


CBC/BMP


Laboratory Tests


20 12:14








Microbiology





Microbiology


20 Blood Culture, Received


          Pending


20 Blood Culture, Received


          Pending





 Assessment/Plan


Patient's 80 years old female with past medical history of diverticulitis 

abscess perforation, dementia, hypothyroidism, atrial fibrillation, CVA 

presented to the hospital after she was found unconsciousness in the nursing 

home in atrial fibrillation with rapid ventricular rate. On arrival patient was 

in rigors with rapid ventricular rate up to 190. Of note patient was recently 

discharged from the hospital after she was found to have perforated viscus 

secondary to perforated diverticular due to diverticulitis, status post IR 

drainage. Patient was discharged from the hospital with course of antibiotics


In emergency room patient was found to have hypotension of 80/40, leukocytosis 

of 16.9, hemoglobin of 11.3, TSH 13.1, K 2.9, lactic acid 4.6. CT of abdomen 

showed significant right kidney hydronephrosis.





Problems





(1) Sepsis


Status:  Acute


Problem Text:  Patient developed severe sepsis with hypotension, leukocytosis an

d lactic acidosis


Most likely secondary to abdominal abscess


Patient will need pressure support


Cefepime IV, vancomycin IV


Continue IV fluid


Await blood culture, urine culture





(2) Intra-abdominal abscess


Status:  Acute


Problem Text:  I talked the radiologist by phone, most likely patient developed 

right kidney hydronephrosis secondary to phlegmon.


Official report stated phlegmonous change in the right pelvis due to prior 

perforated sigmoid


diverticulitis with what appears to be multiloculated air/fluid in the right


pelvis


Appreciate/agree with surgeon consult


Will drain abscess by IR US 





(3) Hypokalemia


Status:  Acute


Problem Text:  We'll repeat BMP


Replaced





(4) Atrial fibrillation with RVR


Status:  Acute


Problem Text:  Most likely secondary to sepsis


Lopressor IV when necessary


I will hold oral targeted anticoagulation for now due to possible surgical 

intervention


heparin drip








Plan / VTE


VTE Prophylaxis Ordered?:  Yes











LACY RAYMOND DO            May 20, 2020 14:46

## 2020-05-20 NOTE — ECGEPIP
[facility] - ED

                                       

                                       Test Date:    2020

Pat Name:     VALENTIN CASTRO                Department:   

Patient ID:   [id number]                 Room:         -

Gender:       Female                   Technician:   [email or web address]

:          [id number]               Requested By: WENDY SORENSEN 

Order Number: [id number]     Reading MD:   Sharmin Dimas

                                 Measurements

Intervals                              Axis          

Rate:         130                      P:            

VT:           0                        QRS:          7

QRSD:         89                       T:            -25

QT:           305                                    

QTc:          449                                    

                           Interpretive Statements

PROBABLE ATRIAL FIBRILLATION WITH RAPID VENTRICULAR RESPONSE WITH ABERRANT

COND

CONDUCTION OR VENTRICULAR PREMATURE COMPLEXES

BASELINE ARTIFACT LIMITS INTERPRETATION

MINIMAL ST DEPRESSION

ABNORMAL RHYTHM ECG

INCREASED RATE 20

Electronically Signed on 2020 12:35:32 EDT by Sharmin Dimas

## 2020-05-20 NOTE — REP
CHEST, SINGLE VIEW:

 

Single view of the chest is performed and compared to a prior study of

05/05/2020.

 

There is again significant elevation of the right hemidiaphragm with mild right

base atelectatic change.  No new infiltrate is seen.  The heart and mediastinum

are unchanged.  There is a large hiatal hernia.  There are no acute changes

compared to the prior study.

 

 

Electronically Signed by

Kristian Suarez MD 05/20/2020 04:44 P

## 2020-05-20 NOTE — REP
CT ABDOMEN AND PELVIS WITHOUT CONTRAST:

 

CT abdomen and pelvis performed without oral or IV contrast.  Sagittal and

coronal reconstruction images are performed.

 

Comparison is made with prior studies 04/28/2020, 05/05/2020 and 05/12/2020.

 

In the visualized lung bases, there is mild bibasilar atelectasis/infiltrate

inferiorly.  There is elevation of the right hemidiaphragm.

 

Liver is grossly unremarkable.  Gallbladder wall appears somewhat thickened.

Spleen is normal in size.  Adrenal glands are grossly unremarkable.  Pancreas is

grossly unremarkable.  There is an exophytic cyst in the upper pole of the left

kidney measuring 3.4 cm in diameter.  There is moderate right hydronephrosis.

There is moderate right hydroureter.  I suspect this is caused by inflammatory

and phlegmonous change in the right pelvis as was originally seen on the CT of

04/28/2020.  However, differentiation of multiloculated air/fluid is difficulty

without IV and oral contrast.  There is definitely some free fluid in the

posterior pelvis.  There is diffuse sigmoid diverticulosis with mild wall

thickening and diverticulitis.  There is again an abscess containing mostly air

but also some fluid in the right abdomen measuring approximately 9.5 cm in

maximum diameter.  No free intraperitoneal air is seen in the abdomen.

Evaluation of the pelvic structures is also limited by metallic right hip

prosthesis.  Urinary bladder contains fluid.  Uterine fibroid is again noted.

There are diffuse degenerative changes of the spine.  There is a large hiatal

hernia.

 

IMPRESSION:

 

Right abdominal abscess noted, originally seen 04/28/2020.  Maximum diameter is

9.5 cm.  It contains mainly air but also some fluid.  I also suspect continued

phlegmonous change in the right pelvis due to prior perforated sigmoid

diverticulitis with what appears to be multiloculated air/fluid in the right

pelvis.  Differentiation from adjacent small bowel is difficult, however, without

IV and oral contrast as well as streak artifact from the right hip prosthesis.

There is free fluid in the posterior pelvis.  Moderate right

hydroureteronephrosis likely caused by inflammatory and phlegmonous change in the

right pelvis.

 

 

Electronically Signed by

Kristian Suarez MD 05/20/2020 04:47 P

## 2020-05-21 ENCOUNTER — HOSPITAL ENCOUNTER (OUTPATIENT)
Dept: HOSPITAL 53 - SKLAB3 | Age: 80
End: 2020-05-21
Attending: FAMILY MEDICINE

## 2020-05-21 VITALS — SYSTOLIC BLOOD PRESSURE: 112 MMHG | DIASTOLIC BLOOD PRESSURE: 75 MMHG

## 2020-05-21 VITALS — SYSTOLIC BLOOD PRESSURE: 92 MMHG | DIASTOLIC BLOOD PRESSURE: 55 MMHG

## 2020-05-21 VITALS — DIASTOLIC BLOOD PRESSURE: 80 MMHG | SYSTOLIC BLOOD PRESSURE: 120 MMHG

## 2020-05-21 VITALS — SYSTOLIC BLOOD PRESSURE: 111 MMHG | DIASTOLIC BLOOD PRESSURE: 55 MMHG

## 2020-05-21 VITALS — DIASTOLIC BLOOD PRESSURE: 74 MMHG | SYSTOLIC BLOOD PRESSURE: 105 MMHG

## 2020-05-21 VITALS — DIASTOLIC BLOOD PRESSURE: 77 MMHG | SYSTOLIC BLOOD PRESSURE: 106 MMHG

## 2020-05-21 VITALS — SYSTOLIC BLOOD PRESSURE: 92 MMHG | DIASTOLIC BLOOD PRESSURE: 53 MMHG

## 2020-05-21 VITALS — DIASTOLIC BLOOD PRESSURE: 64 MMHG | SYSTOLIC BLOOD PRESSURE: 95 MMHG

## 2020-05-21 VITALS — DIASTOLIC BLOOD PRESSURE: 73 MMHG | SYSTOLIC BLOOD PRESSURE: 109 MMHG

## 2020-05-21 VITALS — SYSTOLIC BLOOD PRESSURE: 102 MMHG | DIASTOLIC BLOOD PRESSURE: 56 MMHG

## 2020-05-21 VITALS — DIASTOLIC BLOOD PRESSURE: 53 MMHG | SYSTOLIC BLOOD PRESSURE: 98 MMHG

## 2020-05-21 VITALS — SYSTOLIC BLOOD PRESSURE: 113 MMHG | DIASTOLIC BLOOD PRESSURE: 72 MMHG

## 2020-05-21 VITALS — DIASTOLIC BLOOD PRESSURE: 61 MMHG | SYSTOLIC BLOOD PRESSURE: 105 MMHG

## 2020-05-21 VITALS — SYSTOLIC BLOOD PRESSURE: 93 MMHG | DIASTOLIC BLOOD PRESSURE: 56 MMHG

## 2020-05-21 DIAGNOSIS — D64.9: Primary | ICD-10-CM

## 2020-05-21 LAB
ALBUMIN SERPL BCG-MCNC: 1.7 GM/DL (ref 3.2–5.2)
ALBUMIN SERPL BCG-MCNC: 1.7 GM/DL (ref 3.2–5.2)
ALT SERPL W P-5'-P-CCNC: 16 U/L (ref 12–78)
BILIRUB SERPL-MCNC: 0.5 MG/DL (ref 0.2–1)
BUN SERPL-MCNC: 10 MG/DL (ref 7–18)
BUN SERPL-MCNC: 11 MG/DL (ref 7–18)
C DIFF TOX GENS STL QL NAA+PROBE: NEGATIVE
CALCIUM SERPL-MCNC: 7.2 MG/DL (ref 8.8–10.2)
CALCIUM SERPL-MCNC: 7.6 MG/DL (ref 8.8–10.2)
CALCIUM SERPL-MCNC: 7.7 MG/DL (ref 8.8–10.2)
CALCIUM SERPL-MCNC: 7.9 MG/DL (ref 8.8–10.2)
CHLORIDE SERPL-SCNC: 102 MEQ/L (ref 98–107)
CHLORIDE SERPL-SCNC: 102 MEQ/L (ref 98–107)
CHLORIDE SERPL-SCNC: 103 MEQ/L (ref 98–107)
CHLORIDE SERPL-SCNC: 103 MEQ/L (ref 98–107)
CO2 SERPL-SCNC: 28 MEQ/L (ref 21–32)
CO2 SERPL-SCNC: 28 MEQ/L (ref 21–32)
CO2 SERPL-SCNC: 29 MEQ/L (ref 21–32)
CO2 SERPL-SCNC: 31 MEQ/L (ref 21–32)
CREAT SERPL-MCNC: 0.53 MG/DL (ref 0.55–1.3)
CREAT SERPL-MCNC: 0.55 MG/DL (ref 0.55–1.3)
CREAT SERPL-MCNC: 0.56 MG/DL (ref 0.55–1.3)
CREAT SERPL-MCNC: 0.61 MG/DL (ref 0.55–1.3)
GFR SERPL CREATININE-BSD FRML MDRD: > 60 ML/MIN/{1.73_M2} (ref 39–?)
GLUCOSE SERPL-MCNC: 76 MG/DL (ref 70–100)
GLUCOSE SERPL-MCNC: 83 MG/DL (ref 70–100)
GLUCOSE SERPL-MCNC: 96 MG/DL (ref 70–100)
GLUCOSE SERPL-MCNC: 96 MG/DL (ref 70–100)
HCT VFR BLD AUTO: 28.6 % (ref 36–47)
HGB BLD-MCNC: 9.5 G/DL (ref 12–15.5)
MAGNESIUM SERPL-MCNC: 1.2 MG/DL (ref 1.8–2.4)
MCH RBC QN AUTO: 28.1 PG (ref 27–33)
MCHC RBC AUTO-ENTMCNC: 33.2 G/DL (ref 32–36.5)
MCV RBC AUTO: 84.6 FL (ref 80–96)
PLATELET # BLD AUTO: 334 10^3/UL (ref 150–450)
POTASSIUM SERPL-SCNC: 3.3 MEQ/L (ref 3.5–5.1)
POTASSIUM SERPL-SCNC: 3.7 MEQ/L (ref 3.5–5.1)
POTASSIUM SERPL-SCNC: 3.8 MEQ/L (ref 3.5–5.1)
POTASSIUM SERPL-SCNC: 3.9 MEQ/L (ref 3.5–5.1)
PROT SERPL-MCNC: 5.7 GM/DL (ref 6.4–8.2)
RBC # BLD AUTO: 3.38 10^6/UL (ref 4–5.4)
SODIUM SERPL-SCNC: 135 MEQ/L (ref 136–145)
SODIUM SERPL-SCNC: 136 MEQ/L (ref 136–145)
SODIUM SERPL-SCNC: 136 MEQ/L (ref 136–145)
SODIUM SERPL-SCNC: 137 MEQ/L (ref 136–145)
WBC # BLD AUTO: 19 10^3/UL (ref 4–10)

## 2020-05-21 PROCEDURE — 0W9G30Z DRAINAGE OF PERITONEAL CAVITY WITH DRAINAGE DEVICE, PERCUTANEOUS APPROACH: ICD-10-PCS

## 2020-05-21 RX ADMIN — DEXTROSE MONOHYDRATE SCH MLS/HR: 50 INJECTION, SOLUTION INTRAVENOUS at 04:20

## 2020-05-21 RX ADMIN — POTASSIUM CHLORIDE AND SODIUM CHLORIDE SCH MLS/HR: 900; 150 INJECTION, SOLUTION INTRAVENOUS at 13:57

## 2020-05-21 RX ADMIN — SERTRALINE HYDROCHLORIDE SCH MG: 50 TABLET ORAL at 08:32

## 2020-05-21 RX ADMIN — OMEPRAZOLE SCH MG: 20 CAPSULE, DELAYED RELEASE ORAL at 08:32

## 2020-05-21 RX ADMIN — METOPROLOL TARTRATE SCH MG: 25 TABLET, FILM COATED ORAL at 20:35

## 2020-05-21 RX ADMIN — LEVOTHYROXINE SODIUM SCH MCG: 50 TABLET ORAL at 05:49

## 2020-05-21 RX ADMIN — ATORVASTATIN CALCIUM SCH MG: 20 TABLET, FILM COATED ORAL at 20:35

## 2020-05-21 RX ADMIN — CEFEPIME HYDROCHLORIDE SCH MLS/HR: 2 INJECTION, POWDER, FOR SOLUTION INTRAVENOUS at 01:53

## 2020-05-21 RX ADMIN — OMEPRAZOLE SCH MG: 20 CAPSULE, DELAYED RELEASE ORAL at 20:34

## 2020-05-21 RX ADMIN — CEFEPIME HYDROCHLORIDE SCH MLS/HR: 2 INJECTION, POWDER, FOR SOLUTION INTRAVENOUS at 14:01

## 2020-05-21 NOTE — IPNPDOC
Text Note


Date of Service


The patient was seen on 5/21/20.





NOTE


Subjective: Patient developed blood in the stool overnight, heparin ggt was s

topped. Patient denied any fever or chills, nausea, vomiting or dysuria.





Objective:


VITAL SIGNS: Please see below.


GENERAL: awake, alert, NAD


HEENT: NCAT, anicteric sclera, TORI


NECK: supple, no JVD


CARDIOVASCULAR EXAMINATION: Irregularly irregular, tachycardic at rate 110


RESPIRATORY EXAMINATION: CTA b/l, no wheezes/rales/rhonchi


ABDOMINAL EXAMINATION: positive bowel sounds x 4, NT


EXTREMITIES: no cyanosis, clubbing, edema


SKIN: warm, no rashes.


NEUROLOGICAL EXAMINATION: AAO x 3, no motor/sensory deficits


PSYCHIATRIC EXAMINATION: calm, normal affect





Assessment/Plan


Patient's 80 years old female with past medical history of diverticulitis 

abscess perforation, dementia, hypothyroidism, atrial fibrillation, CVA 

presented to the hospital after she was found unconsciousness in the nursing h

ome in atrial fibrillation with rapid ventricular rate. On arrival patient was 

in rigors with rapid ventricular rate up to 190. Of note patient was recently 

discharged from the hospital after she was found to have perforated viscus 

secondary to perforated diverticular due to diverticulitis, status post IR 

drainage. Patient was discharged from the hospital with course of antibiotics


In emergency room patient was found to have hypotension of 80/40, leukocytosis 

of 16.9, hemoglobin of 11.3, TSH 14.1, K 2.9, lactic acid 4.6. CT of abdomen 

showed significant right kidney hydronephrosis.





Problems





(1) Sepsis


Patient developed severe sepsis with hypotension, leukocytosis and lactic 

acidosis on admission


Most likely secondary to abdominal abscess


On 5/21/20 blood pressure improved after IV fluid


Continue with Cefepime IV, vancomycin IV DC


Continue IV fluid


blood culture positive for gram-negative rods,


Await urine culture





(2) Intra-abdominal abscess


CT showed phlegmonous change in the right pelvis due to prior perforated sigmoid


diverticulitis with what appears to be multiloculated air/fluid in the right 

pelvis


Dr. Carey talked to patient about possible surgery, patient declined surgical 

intervention


Will drain abscess by IR US 





(3) Hypokalemia


 We'll repeat BMP


Replaced





(4) Atrial fibrillation with RVR


 Most likely secondary to sepsis


Lopressor IV when necessary


Anticoagulation on hold due to GI bleed overnight








Right kidney hydronephrosis


Most likely secondary to abdominal abscess, hopefully it ll be resolved after 

drain.


We will repeat CT scan on Monday





Plan / VTE


VTE Prophylaxis Ordered?:  Yes





VS,Mahesh, I+O


VS, Fishbone, I+O


Laboratory Tests


5/20/20 12:14








5/20/20 14:44








5/20/20 17:47








5/20/20 22:35








5/21/20 02:00








5/21/20 06:40











Vital Signs








  Date Time  Temp Pulse Resp B/P (MAP) Pulse Ox O2 Delivery O2 Flow Rate FiO2


 


5/21/20 08:01 99.1 118 20 95/64 (74) 98 Room Air  


 


5/20/20 11:45       10.0 














I&O- Last 24 Hours up to 6 AM 


 


 5/21/20





 05:59


 


Intake Total 2135 ml


 


Output Total 200 ml


 


Balance 1935 ml

















LACY RAYMOND DO            May 21, 2020 11:12

## 2020-05-21 NOTE — REP
CT-GUIDED ABDOMINAL ABSCESS DRAIN

 

The procedure was performed under the direct supervision of Dr. Suarze.

 

The patient has a history of A 9.5 cm abdominal abscess seen on a previous CT

scan performed on 05/20/2020.

 

The risks and benefits of the procedure were explained to the patient and

informed consent was obtained.

 

The abdominal abscess was localized using CT guidance.  The skin was prepped and

draped in a sterile fashion.  1% lidocaine was used as a local anesthetic.  Using

CT guidance and trocar technique a 10-[place] Skater APDL catheter was inserted

using trocar technique.  40 ml of beige proteinaceous fluid was withdrawn and

sent to lab for analysis.  The catheter was affixed to the skin and a sterile

dressing was applied.  The catheter was connected to a gravity drainage bag.

 

The patient tolerated the procedure well and there were no immediate

complications.  After the appropriate amount of monitored convalescence the

patient was discharged from the department.

 

 

Electronically Signed by

WILSON Betts 05/21/2020 03:15 P

Electronically Signed by

Kristian Suarez MD 05/21/2020 03:37 P

## 2020-05-22 VITALS — DIASTOLIC BLOOD PRESSURE: 82 MMHG | SYSTOLIC BLOOD PRESSURE: 130 MMHG

## 2020-05-22 VITALS — SYSTOLIC BLOOD PRESSURE: 115 MMHG | DIASTOLIC BLOOD PRESSURE: 76 MMHG

## 2020-05-22 VITALS — SYSTOLIC BLOOD PRESSURE: 115 MMHG | DIASTOLIC BLOOD PRESSURE: 68 MMHG

## 2020-05-22 VITALS — DIASTOLIC BLOOD PRESSURE: 73 MMHG | SYSTOLIC BLOOD PRESSURE: 113 MMHG

## 2020-05-22 VITALS — DIASTOLIC BLOOD PRESSURE: 76 MMHG | SYSTOLIC BLOOD PRESSURE: 112 MMHG

## 2020-05-22 VITALS — DIASTOLIC BLOOD PRESSURE: 69 MMHG | SYSTOLIC BLOOD PRESSURE: 111 MMHG

## 2020-05-22 VITALS — DIASTOLIC BLOOD PRESSURE: 77 MMHG | SYSTOLIC BLOOD PRESSURE: 117 MMHG

## 2020-05-22 VITALS — DIASTOLIC BLOOD PRESSURE: 77 MMHG | SYSTOLIC BLOOD PRESSURE: 121 MMHG

## 2020-05-22 VITALS — SYSTOLIC BLOOD PRESSURE: 117 MMHG | DIASTOLIC BLOOD PRESSURE: 73 MMHG

## 2020-05-22 LAB
BUN SERPL-MCNC: 10 MG/DL (ref 7–18)
BUN SERPL-MCNC: 9 MG/DL (ref 7–18)
CALCIUM SERPL-MCNC: 7.5 MG/DL (ref 8.8–10.2)
CALCIUM SERPL-MCNC: 7.6 MG/DL (ref 8.8–10.2)
CHLORIDE SERPL-SCNC: 103 MEQ/L (ref 98–107)
CHLORIDE SERPL-SCNC: 105 MEQ/L (ref 98–107)
CO2 SERPL-SCNC: 26 MEQ/L (ref 21–32)
CO2 SERPL-SCNC: 27 MEQ/L (ref 21–32)
CREAT SERPL-MCNC: 0.38 MG/DL (ref 0.55–1.3)
CREAT SERPL-MCNC: 0.4 MG/DL (ref 0.55–1.3)
GFR SERPL CREATININE-BSD FRML MDRD: > 60 ML/MIN/{1.73_M2} (ref 39–?)
GFR SERPL CREATININE-BSD FRML MDRD: > 60 ML/MIN/{1.73_M2} (ref 39–?)
GLUCOSE SERPL-MCNC: 80 MG/DL (ref 70–100)
GLUCOSE SERPL-MCNC: 84 MG/DL (ref 70–100)
HCT VFR BLD AUTO: 25.6 % (ref 36–47)
HGB BLD-MCNC: 8.6 G/DL (ref 12–15.5)
MAGNESIUM SERPL-MCNC: 1.4 MG/DL (ref 1.8–2.4)
MCH RBC QN AUTO: 27.8 PG (ref 27–33)
MCHC RBC AUTO-ENTMCNC: 33.6 G/DL (ref 32–36.5)
MCV RBC AUTO: 82.8 FL (ref 80–96)
PHOSPHATE SERPL-MCNC: 1.7 MG/DL (ref 2.5–4.9)
PLATELET # BLD AUTO: 328 10^3/UL (ref 150–450)
POTASSIUM SERPL-SCNC: 3.7 MEQ/L (ref 3.5–5.1)
POTASSIUM SERPL-SCNC: 3.7 MEQ/L (ref 3.5–5.1)
RBC # BLD AUTO: 3.09 10^6/UL (ref 4–5.4)
SODIUM SERPL-SCNC: 136 MEQ/L (ref 136–145)
SODIUM SERPL-SCNC: 137 MEQ/L (ref 136–145)
WBC # BLD AUTO: 11.3 10^3/UL (ref 4–10)

## 2020-05-22 RX ADMIN — METOPROLOL TARTRATE SCH MG: 25 TABLET, FILM COATED ORAL at 08:31

## 2020-05-22 RX ADMIN — CEFEPIME HYDROCHLORIDE SCH MLS/HR: 2 INJECTION, POWDER, FOR SOLUTION INTRAVENOUS at 01:37

## 2020-05-22 RX ADMIN — LEVOTHYROXINE SODIUM SCH MCG: 50 TABLET ORAL at 05:56

## 2020-05-22 RX ADMIN — ATORVASTATIN CALCIUM SCH MG: 20 TABLET, FILM COATED ORAL at 21:29

## 2020-05-22 RX ADMIN — METOPROLOL TARTRATE SCH MG: 25 TABLET, FILM COATED ORAL at 21:00

## 2020-05-22 RX ADMIN — CEFEPIME HYDROCHLORIDE SCH MLS/HR: 2 INJECTION, POWDER, FOR SOLUTION INTRAVENOUS at 12:37

## 2020-05-22 RX ADMIN — POTASSIUM CHLORIDE AND SODIUM CHLORIDE SCH MLS/HR: 900; 150 INJECTION, SOLUTION INTRAVENOUS at 01:37

## 2020-05-22 RX ADMIN — OMEPRAZOLE SCH MG: 20 CAPSULE, DELAYED RELEASE ORAL at 21:29

## 2020-05-22 RX ADMIN — METOPROLOL TARTRATE SCH MG: 25 TABLET, FILM COATED ORAL at 12:39

## 2020-05-22 RX ADMIN — POTASSIUM CHLORIDE AND SODIUM CHLORIDE SCH MLS/HR: 900; 150 INJECTION, SOLUTION INTRAVENOUS at 16:22

## 2020-05-22 RX ADMIN — POTASSIUM CHLORIDE AND SODIUM CHLORIDE SCH MLS/HR: 900; 150 INJECTION, SOLUTION INTRAVENOUS at 07:02

## 2020-05-22 RX ADMIN — METOPROLOL TARTRATE SCH MG: 25 TABLET, FILM COATED ORAL at 16:22

## 2020-05-22 RX ADMIN — SERTRALINE HYDROCHLORIDE SCH MG: 50 TABLET ORAL at 08:31

## 2020-05-22 RX ADMIN — OMEPRAZOLE SCH MG: 20 CAPSULE, DELAYED RELEASE ORAL at 08:31

## 2020-05-22 NOTE — IPNPDOC
Text Note


Date of Service


The patient was seen on 5/22/20.





NOTE


Patient remains with anatomically stable. She underwent percutaneous drainage of

her pelvic abscess yesterday. Her leukocytosis is improved. She denies any 

ongoing abdominal pain. She is tolerating soft diet.





Vital signs stable. Still in A. fib, heart rate low 100s





On exam


She looks comfortable, frail-appearing


Awake, alert and oriented.


Abdominal exam shows relatively benign, flat abdomen. Nontender on palpation. 

She has a percutaneous drain with a slightly bloody, purulent drainage in the 

tube and small amount in the bag.








Impression and plan


Impression diverticulitis with recurrent abscess status post percutaneous 

drainage.





Since this is previously been drained, I have told the patient that we should 

not remove the drain out right and make sure that the etiology of the infection 

should be clarified first as well as we need to make sure that the abscess pocke

t fully collapses. This will take several weeks to a couple months to figure 

out. I would like to repeat the CT, with contrast this time on Monday to 

evaluate for the degree of inflammation related to the colon, state of the 

abscess and also the right-sided hydronephrosis. If this is all better than she 

could probably go home with the drain and follow-up with me for an outpatient 

colonoscopy in another couple weeks on antibiotics depending on what grows from 

the drain culture. I will also send for CAD as a marker since I'm not too sure 

that this is just plain diverticulitis.





VS,Fishbone, I+O


VS, Fishbone, I+O


Laboratory Tests


5/21/20 13:47








5/22/20 04:45








5/22/20 04:54











Vital Signs








  Date Time  Temp Pulse Resp B/P (MAP) Pulse Ox O2 Delivery O2 Flow Rate FiO2


 


5/22/20 08:31  103  130/82    


 


5/22/20 08:00 98.6  18  95 Room Air  


 


5/20/20 11:45       10.0 














I&O- Last 24 Hours up to 6 AM 


 


 5/22/20





 06:00


 


Intake Total 2020 ml


 


Output Total 670 ml


 


Balance 1350 ml

















ANANDA RIVAS MD         May 22, 2020 10:25

## 2020-05-22 NOTE — IPNPDOC
Text Note


Date of Service


The patient was seen on 5/22/20.





NOTE


Subjective: No any acute event overnight. Serosanguineous fluid via drainage 

around 60 mL. Patient denied any fever or chills, nausea, vomiting or dysuria.





Objective:


VITAL SIGNS: Please see below.


GENERAL: awake, alert, NAD


HEENT: NCAT, anicteric sclera, TORI


NECK: supple, no JVD


CARDIOVASCULAR EXAMINATION: Irregularly irregular, tachycardic at rate 110


RESPIRATORY EXAMINATION: CTA b/l, no wheezes/rales/rhonchi


ABDOMINAL EXAMINATION: positive bowel sounds x 4, NT


EXTREMITIES: no cyanosis, clubbing, edema


SKIN: warm, no rashes.


NEUROLOGICAL EXAMINATION: AAO x 3, no motor/sensory deficits


PSYCHIATRIC EXAMINATION: calm, normal affect





Assessment/Plan


Patient's 80 years old female with past medical history of diverticulitis 

abscess perforation, dementia, hypothyroidism, atrial fibrillation, CVA 

presented to the hospital after she was found unconsciousness in the nursing 

home in atrial fibrillation with rapid ventricular rate. On arrival patient was 

in rigors with rapid ventricular rate up to 190. Of note patient was recently 

discharged from the hospital after she was found to have perforated viscus 

secondary to perforated diverticular due to diverticulitis, status post IR 

drainage. Patient was discharged from the hospital with course of antibiotics


In emergency room patient was found to have hypotension of 80/40, leukocytosis 

of 16.9, hemoglobin of 11.3, TSH 14.1, K 2.9, lactic acid 4.6. CT of abdomen 

showed significant right kidney hydronephrosis.





Problems





(1) Sepsis


Resolved


Patient developed severe sepsis with hypotension, leukocytosis and lactic 

acidosis on admission


Most likely secondary to abdominal abscess


On 5/21/20 blood pressure improved after IV fluid


Continue with Cefepime IV, vancomycin IV DC


blood culture positive for gram-negative rods


Await urine culture





(2) Intra-abdominal abscess


CT showed phlegmonous change in the right pelvis due to prior perforated sigmoid


diverticulitis with what appears to be multiloculated air/fluid in the right 

pelvis


Dr. Carey talked to patient about possible surgery, patient declined surgical 

intervention


abscess by IR US abscess was drained on 5/21/20, abscess culture positive for 

gram-negative rods and gram-positive rods


We'll repeat abdominal CT scan on Monday.


We'll check CEA, there is concern for colon cancer. Colonoscopy in the 

outpatient settings





(3) Hypokalemia


 We'll repeat BMP


Replaced





(4) Atrial fibrillation with RVR


 Most likely secondary to sepsis


Lopressor IV when necessary


Anticoagulation on hold due to GI bleed overnight


I increased her dose of beta blockers 





Right kidney hydronephrosis


Most likely secondary to abdominal abscess, hopefully it ll be resolved after 

drain.


We will repeat CT scan on Monday





Plan / VTE


VTE Prophylaxis Ordered?:  Yes





VS,Fishbone, I+O


VS, Fishbone, I+O


Laboratory Tests


5/21/20 13:47








5/22/20 04:45








5/22/20 04:54











Vital Signs








  Date Time  Temp Pulse Resp B/P (MAP) Pulse Ox O2 Delivery O2 Flow Rate FiO2


 


5/22/20 08:00 98.6 104 18 130/82 (98) 95 Room Air  


 


5/20/20 11:45       10.0 














I&O- Last 24 Hours up to 6 AM 


 


 5/22/20





 05:59


 


Intake Total 2915 ml


 


Output Total 670 ml


 


Balance 2245 ml

















LACY RAYMOND DO            May 22, 2020 10:53

## 2020-05-22 NOTE — ECGEPIP
[facility]

                                       

                                       Test Date:    2020

Pat Name:     VALENTIN CASTRO                Department:   

Patient ID:   [id number]                 Room:         [place] [id number]

Gender:       Female                   Technician:   CLINT

:          [id number]               Requested By: Queta MARTINEZ

Order Number: [id number]     Reading MD:   Rola Mtz

                                 Measurements

Intervals                              Axis          

Rate:         103                      P:            

WY:           0                        QRS:          4

QRSD:         95                       T:            266

QT:           313                                    

QTc:          412                                    

                           Interpretive Statements

ATRIAL FIBRILLATION WITH MODERATE VENTRICULAR RESPONSE

LOW QRS VOLTAGE IN EXTREMITY LEADS

STT ABN

RATE SLOWER  C/W5//20

Electronically Signed on 2020 10:13:24 EDT by Rola Mtz

## 2020-05-23 VITALS — DIASTOLIC BLOOD PRESSURE: 94 MMHG | SYSTOLIC BLOOD PRESSURE: 126 MMHG

## 2020-05-23 VITALS — SYSTOLIC BLOOD PRESSURE: 100 MMHG | DIASTOLIC BLOOD PRESSURE: 68 MMHG

## 2020-05-23 VITALS — SYSTOLIC BLOOD PRESSURE: 121 MMHG | DIASTOLIC BLOOD PRESSURE: 80 MMHG

## 2020-05-23 LAB
BUN SERPL-MCNC: 8 MG/DL (ref 7–18)
CALCIUM SERPL-MCNC: 7.4 MG/DL (ref 8.8–10.2)
CHLORIDE SERPL-SCNC: 105 MEQ/L (ref 98–107)
CO2 SERPL-SCNC: 27 MEQ/L (ref 21–32)
CREAT SERPL-MCNC: 0.35 MG/DL (ref 0.55–1.3)
GFR SERPL CREATININE-BSD FRML MDRD: > 60 ML/MIN/{1.73_M2} (ref 39–?)
GLUCOSE SERPL-MCNC: 83 MG/DL (ref 70–100)
HCT VFR BLD AUTO: 24.3 % (ref 36–47)
HGB BLD-MCNC: 8.3 G/DL (ref 12–15.5)
IRON SATN MFR SERPL: 35.6 % (ref 13.2–45)
IRON SERPL-MCNC: 32 UG/DL (ref 50–170)
MCH RBC QN AUTO: 28.1 PG (ref 27–33)
MCHC RBC AUTO-ENTMCNC: 34.2 G/DL (ref 32–36.5)
MCV RBC AUTO: 82.4 FL (ref 80–96)
PLATELET # BLD AUTO: 318 10^3/UL (ref 150–450)
POTASSIUM SERPL-SCNC: 3.4 MEQ/L (ref 3.5–5.1)
RBC # BLD AUTO: 2.95 10^6/UL (ref 4–5.4)
SODIUM SERPL-SCNC: 138 MEQ/L (ref 136–145)
TIBC SERPL-MCNC: 90 UG/DL (ref 250–450)
WBC # BLD AUTO: 8.4 10^3/UL (ref 4–10)

## 2020-05-23 RX ADMIN — OMEPRAZOLE SCH MG: 20 CAPSULE, DELAYED RELEASE ORAL at 21:04

## 2020-05-23 RX ADMIN — CEFEPIME HYDROCHLORIDE SCH MLS/HR: 2 INJECTION, POWDER, FOR SOLUTION INTRAVENOUS at 00:58

## 2020-05-23 RX ADMIN — OMEPRAZOLE SCH MG: 20 CAPSULE, DELAYED RELEASE ORAL at 10:02

## 2020-05-23 RX ADMIN — CEFTRIAXONE SCH MLS/HR: 1 INJECTION, POWDER, FOR SOLUTION INTRAMUSCULAR; INTRAVENOUS at 23:49

## 2020-05-23 RX ADMIN — ACETAMINOPHEN PRN MG: 325 TABLET ORAL at 15:14

## 2020-05-23 RX ADMIN — CEFTRIAXONE SCH MLS/HR: 1 INJECTION, POWDER, FOR SOLUTION INTRAMUSCULAR; INTRAVENOUS at 12:27

## 2020-05-23 RX ADMIN — Medication SCH MG: at 10:02

## 2020-05-23 RX ADMIN — LEVOTHYROXINE SODIUM SCH MCG: 50 TABLET ORAL at 05:46

## 2020-05-23 RX ADMIN — POTASSIUM CHLORIDE AND SODIUM CHLORIDE SCH MLS/HR: 900; 150 INJECTION, SOLUTION INTRAVENOUS at 18:20

## 2020-05-23 RX ADMIN — SERTRALINE HYDROCHLORIDE SCH MG: 50 TABLET ORAL at 10:02

## 2020-05-23 RX ADMIN — POTASSIUM CHLORIDE AND SODIUM CHLORIDE SCH MLS/HR: 900; 150 INJECTION, SOLUTION INTRAVENOUS at 04:26

## 2020-05-23 RX ADMIN — METOPROLOL TARTRATE SCH MG: 25 TABLET, FILM COATED ORAL at 15:14

## 2020-05-23 RX ADMIN — ATORVASTATIN CALCIUM SCH MG: 20 TABLET, FILM COATED ORAL at 21:04

## 2020-05-23 RX ADMIN — METOPROLOL TARTRATE SCH MG: 25 TABLET, FILM COATED ORAL at 21:00

## 2020-05-23 RX ADMIN — METOPROLOL TARTRATE SCH MG: 25 TABLET, FILM COATED ORAL at 10:02

## 2020-05-23 NOTE — IPN
DATE:  05/23/2020

 

The patient was a admitted several days ago with a recurrent pelvic

abscess/diverticular abscess and had this percutaneously drained.  Since that

time, her white count has dropped to normal and she has been afebrile.  She

states that she has been eating well, although she is a poor historian and has a

history of dementia and thus history somewhat vague, but in any case seems to be

doing rather well at this point.  No new findings were appreciated.

 

She can continue on a regular diet.  Continue on IV antibiotics.  Dr. Trinidad's

plan for her was to repeat the CAT scan of her pelvis on Monday.  Until then,

just continue with te antibiotics and if the abscess cavity has decompressed

nicely, could possibly discharged to home with the drain in place, although he

etiology of this is concerning given its recurrence and that it may not be a

simple diverticular abscess and may be related to benign or malignant etiology.

Thus, he has discussed possibly of proceeding with colonoscopy at some point in

the near future.  In any case, will continue with our current treatment at this

time.

## 2020-05-23 NOTE — IPNPDOC
Text Note


Date of Service


The patient was seen on 5/23/20.





NOTE


Subjective: No any acute event overnight.  Patient denied any fever or chills, 

nausea, vomiting or dysuria.





Objective:


VITAL SIGNS: Please see below.


GENERAL: awake, alert, NAD


HEENT: NCAT, anicteric sclera, TORI


NECK: supple, no JVD


CARDIOVASCULAR EXAMINATION: Irregularly irregular, tachycardic at rate 110


RESPIRATORY EXAMINATION: CTA b/l, no wheezes/rales/rhonchi


ABDOMINAL EXAMINATION: positive bowel sounds x 4, NT


EXTREMITIES: no cyanosis, clubbing, edema


SKIN: warm, no rashes.


NEUROLOGICAL EXAMINATION: AAO x 3, no motor/sensory deficits


PSYCHIATRIC EXAMINATION: calm, normal affect





Assessment/Plan


Patient's 80 years old female with past medical history of diverticulitis 

abscess perforation, dementia, hypothyroidism, atrial fibrillation, CVA 

presented to the hospital after she was found unconsciousness in the nursing 

home in atrial fibrillation with rapid ventricular rate. On arrival patient was 

in rigors with rapid ventricular rate up to 190. Of note patient was recently 

discharged from the hospital after she was found to have perforated viscus 

secondary to perforated diverticular due to diverticulitis, status post IR 

drainage. Patient was discharged from the hospital with course of antibiotics


In emergency room patient was found to have hypotension of 80/40, leukocytosis 

of 16.9, hemoglobin of 11.3, TSH 14.1, K 2.9, lactic acid 4.6. CT of abdomen s

howed significant right kidney hydronephrosis.





Problems





(1) Sepsis


Resolved


Patient developed severe sepsis with hypotension, leukocytosis and lactic 

acidosis on admission


Most likely secondary to abdominal abscess


On 5/21/20 blood pressure improved after IV fluid


DC Cefepime IV, vancomycin IV DC


Ceftriaxone IV started on 5/23/20


blood culture positive for gram-negative Escherichia coli, abscess culture 

positive for Escherichia coli


Await urine culture





(2) Intra-abdominal abscess


CT showed phlegmonous change in the right pelvis due to prior perforated sigmoid


diverticulitis with what appears to be multiloculated air/fluid in the right 

pelvis


Dr. Carey talked to patient about possible surgery, patient declined surgical 

intervention


abscess by IR US abscess was drained on 5/21/20, continuous drainage placed


abscess culture positive for Escherichia coli


We'll repeat abdominal CT scan on Monday.


We'll check CEA, there is concern for colon cancer. Colonoscopy in the 

outpatient settings





(3) Hypokalemia


 We'll repeat BMP


Replaced





(4) Atrial fibrillation with RVR


 Most likely secondary to sepsis


Lopressor IV when necessary


Anticoagulation on hold due to GI bleed overnight


I increased her dose of beta blockers 





Right kidney hydronephrosis


Most likely secondary to abdominal abscess, hopefully it ll be resolved after 

drain.


We will repeat CT scan on Monday





Normocytic anemia


Multifactorial, superimposed with GI blood loss


We will check iron panel, B12 and folate





VS,Fishbone, I+O


VS, Fishbone, I+O


Laboratory Tests


5/22/20 12:01








5/23/20 06:15








5/23/20 06:18











Vital Signs








  Date Time  Temp Pulse Resp B/P (MAP) Pulse Ox O2 Delivery O2 Flow Rate FiO2


 


5/23/20 10:02  106  126/84    


 


5/23/20 06:00 97.7  18  95 Room Air  


 


5/20/20 11:45       10.0 














I&O- Last 24 Hours up to 6 AM 


 


 5/23/20





 06:00


 


Intake Total 2320 ml


 


Output Total 640 ml


 


Balance 1680 ml

















LACY RAYMOND DO            May 23, 2020 11:29

## 2020-05-24 VITALS — SYSTOLIC BLOOD PRESSURE: 115 MMHG | DIASTOLIC BLOOD PRESSURE: 78 MMHG

## 2020-05-24 VITALS — DIASTOLIC BLOOD PRESSURE: 75 MMHG | SYSTOLIC BLOOD PRESSURE: 120 MMHG

## 2020-05-24 VITALS — DIASTOLIC BLOOD PRESSURE: 76 MMHG | SYSTOLIC BLOOD PRESSURE: 110 MMHG

## 2020-05-24 LAB
BUN SERPL-MCNC: 6 MG/DL (ref 7–18)
CALCIUM SERPL-MCNC: 7.7 MG/DL (ref 8.8–10.2)
CHLORIDE SERPL-SCNC: 105 MEQ/L (ref 98–107)
CO2 SERPL-SCNC: 26 MEQ/L (ref 21–32)
CREAT SERPL-MCNC: 0.36 MG/DL (ref 0.55–1.3)
GFR SERPL CREATININE-BSD FRML MDRD: > 60 ML/MIN/{1.73_M2} (ref 39–?)
GLUCOSE SERPL-MCNC: 72 MG/DL (ref 70–100)
HCT VFR BLD AUTO: 26.5 % (ref 36–47)
HGB BLD-MCNC: 8.7 G/DL (ref 12–15.5)
MCH RBC QN AUTO: 27.3 PG (ref 27–33)
MCHC RBC AUTO-ENTMCNC: 32.8 G/DL (ref 32–36.5)
MCV RBC AUTO: 83.1 FL (ref 80–96)
PLATELET # BLD AUTO: 333 10^3/UL (ref 150–450)
POTASSIUM SERPL-SCNC: 3.8 MEQ/L (ref 3.5–5.1)
RBC # BLD AUTO: 3.19 10^6/UL (ref 4–5.4)
SODIUM SERPL-SCNC: 137 MEQ/L (ref 136–145)
WBC # BLD AUTO: 9.5 10^3/UL (ref 4–10)

## 2020-05-24 RX ADMIN — METOPROLOL TARTRATE SCH MG: 25 TABLET, FILM COATED ORAL at 17:44

## 2020-05-24 RX ADMIN — CEFTRIAXONE SCH MLS/HR: 1 INJECTION, POWDER, FOR SOLUTION INTRAMUSCULAR; INTRAVENOUS at 23:50

## 2020-05-24 RX ADMIN — LEVOTHYROXINE SODIUM SCH MCG: 50 TABLET ORAL at 05:33

## 2020-05-24 RX ADMIN — CEFTRIAXONE SCH MLS/HR: 1 INJECTION, POWDER, FOR SOLUTION INTRAMUSCULAR; INTRAVENOUS at 11:17

## 2020-05-24 RX ADMIN — OMEPRAZOLE SCH MG: 20 CAPSULE, DELAYED RELEASE ORAL at 20:34

## 2020-05-24 RX ADMIN — Medication SCH MG: at 09:59

## 2020-05-24 RX ADMIN — ATORVASTATIN CALCIUM SCH MG: 20 TABLET, FILM COATED ORAL at 20:34

## 2020-05-24 RX ADMIN — OMEPRAZOLE SCH MG: 20 CAPSULE, DELAYED RELEASE ORAL at 09:59

## 2020-05-24 RX ADMIN — METOPROLOL TARTRATE SCH MG: 25 TABLET, FILM COATED ORAL at 10:00

## 2020-05-24 RX ADMIN — METOPROLOL TARTRATE SCH MG: 25 TABLET, FILM COATED ORAL at 20:33

## 2020-05-24 RX ADMIN — SERTRALINE HYDROCHLORIDE SCH MG: 50 TABLET ORAL at 09:59

## 2020-05-24 RX ADMIN — POTASSIUM CHLORIDE AND SODIUM CHLORIDE SCH MLS/HR: 900; 150 INJECTION, SOLUTION INTRAVENOUS at 23:45

## 2020-05-24 RX ADMIN — Medication SCH MG: at 20:34

## 2020-05-24 RX ADMIN — POTASSIUM CHLORIDE AND SODIUM CHLORIDE SCH MLS/HR: 900; 150 INJECTION, SOLUTION INTRAVENOUS at 06:14

## 2020-05-24 NOTE — IPNPDOC
Text Note


Date of Service


The patient was seen on 5/24/20.





NOTE


Subjective: No any acute event overnight. Patient stated that she has a good 

appetite and slept well


 Patient denied any fever or chills, nausea, vomiting or dysuria.





Objective:


VITAL SIGNS: Please see below.


GENERAL: awake, alert, NAD


HEENT: NCAT, anicteric sclera, TORI


NECK: supple, no JVD


CARDIOVASCULAR EXAMINATION: Irregularly irregular, tachycardic at rate 110


RESPIRATORY EXAMINATION: CTA b/l, no wheezes/rales/rhonchi


ABDOMINAL EXAMINATION: positive bowel sounds x 4, NT


EXTREMITIES: no cyanosis, clubbing, edema


SKIN: warm, no rashes.


NEUROLOGICAL EXAMINATION: AAO x 3, no motor/sensory deficits


PSYCHIATRIC EXAMINATION: calm, normal affect





Assessment/Plan


Patient's 80 years old female with past medical history of diverticulitis 

abscess perforation, dementia, hypothyroidism, atrial fibrillation, CVA 

presented to the hospital after she was found unconsciousness in the nursing 

home in atrial fibrillation with rapid ventricular rate. On arrival patient was 

in rigors with rapid ventricular rate up to 190. Of note patient was recently 

discharged from the hospital after she was found to have perforated viscus 

secondary to perforated diverticular due to diverticulitis, status post IR 

drainage. Patient was discharged from the hospital with course of antibiotics


In emergency room patient was found to have hypotension of 80/40, leukocytosis 

of 16.9, hemoglobin of 11.3, TSH 14.1, K 2.9, lactic acid 4.6. CT of abdomen 

showed significant right kidney hydronephrosis.





Problems





(1) Sepsis


Resolved


Patient developed severe sepsis with hypotension, leukocytosis and lactic 

acidosis on admission


Most likely secondary to abdominal abscess


On 5/21/20 blood pressure improved after IV fluid


DC Cefepime IV, vancomycin IV DC


Ceftriaxone IV started on 5/23/20


blood culture positive for gram-negative Escherichia coli, abscess culture 

positive for Escherichia coli and Streptococcus mitis


Await urine culture





(2) Intra-abdominal abscess


CT showed phlegmonous change in the right pelvis due to prior perforated sigmoid


diverticulitis with what appears to be multiloculated air/fluid in the right 

pelvis


Dr. Carey talked to patient about possible surgery, patient declined surgical 

intervention


abscess by IR US abscess was drained on 5/21/20, continuous drainage placed


abscess culture positive for Escherichia coli


We'll repeat abdominal CT scan on Monday.


CEA pending, there is concern for colon cancer. Colonoscopy in the outpatient 

settings





(3) Hypokalemia


Resolved





(4) Atrial fibrillation with RVR


Most likely secondary to sepsis


Lopressor IV when necessary


Anticoagulation on hold due to GI bleed


I increased her dose of beta blockers, heart rate is under control





Right kidney hydronephrosis


Most likely secondary to abdominal abscess, hopefully it ll be resolved after 

drain.


We will repeat CT scan on Monday





Normocytic anemia


Multifactorial, superimposed with GI blood loss


Patient has low iron. We will start iron supplementation


B12 and folate pending





VS,Fishbone, I+O


VS, Fishbone, I+O


Laboratory Tests


5/24/20 05:34








5/24/20 05:36











Vital Signs








  Date Time  Temp Pulse Resp B/P (MAP) Pulse Ox O2 Delivery O2 Flow Rate FiO2


 


5/24/20 10:00  100  107/78    


 


5/24/20 06:00 97.3  16  95 Room Air  


 


5/20/20 11:45       10.0 














I&O- Last 24 Hours up to 6 AM 


 


 5/24/20





 06:00


 


Intake Total 2260 ml


 


Output Total 10 ml


 


Balance 2250 ml

















LACY RAYMOND DO            May 24, 2020 12:19

## 2020-05-25 VITALS — SYSTOLIC BLOOD PRESSURE: 114 MMHG | DIASTOLIC BLOOD PRESSURE: 74 MMHG

## 2020-05-25 VITALS — SYSTOLIC BLOOD PRESSURE: 122 MMHG | DIASTOLIC BLOOD PRESSURE: 72 MMHG

## 2020-05-25 VITALS — DIASTOLIC BLOOD PRESSURE: 79 MMHG | SYSTOLIC BLOOD PRESSURE: 116 MMHG

## 2020-05-25 LAB
BUN SERPL-MCNC: 6 MG/DL (ref 7–18)
CALCIUM SERPL-MCNC: 7.6 MG/DL (ref 8.8–10.2)
CHLORIDE SERPL-SCNC: 103 MEQ/L (ref 98–107)
CO2 SERPL-SCNC: 27 MEQ/L (ref 21–32)
CREAT SERPL-MCNC: 0.32 MG/DL (ref 0.55–1.3)
GFR SERPL CREATININE-BSD FRML MDRD: > 60 ML/MIN/{1.73_M2} (ref 39–?)
GLUCOSE SERPL-MCNC: 81 MG/DL (ref 70–100)
HCT VFR BLD AUTO: 25.6 % (ref 36–47)
HGB BLD-MCNC: 8.4 G/DL (ref 12–15.5)
MCH RBC QN AUTO: 27.1 PG (ref 27–33)
MCHC RBC AUTO-ENTMCNC: 32.8 G/DL (ref 32–36.5)
MCV RBC AUTO: 82.6 FL (ref 80–96)
PLATELET # BLD AUTO: 309 10^3/UL (ref 150–450)
POTASSIUM SERPL-SCNC: 3.2 MEQ/L (ref 3.5–5.1)
RBC # BLD AUTO: 3.1 10^6/UL (ref 4–5.4)
SODIUM SERPL-SCNC: 138 MEQ/L (ref 136–145)
WBC # BLD AUTO: 11.4 10^3/UL (ref 4–10)

## 2020-05-25 RX ADMIN — Medication SCH MG: at 11:04

## 2020-05-25 RX ADMIN — SERTRALINE HYDROCHLORIDE SCH MG: 50 TABLET ORAL at 11:05

## 2020-05-25 RX ADMIN — CEFTRIAXONE SCH MLS/HR: 1 INJECTION, POWDER, FOR SOLUTION INTRAMUSCULAR; INTRAVENOUS at 23:08

## 2020-05-25 RX ADMIN — ATORVASTATIN CALCIUM SCH MG: 20 TABLET, FILM COATED ORAL at 21:13

## 2020-05-25 RX ADMIN — CEFTRIAXONE SCH MLS/HR: 1 INJECTION, POWDER, FOR SOLUTION INTRAMUSCULAR; INTRAVENOUS at 12:34

## 2020-05-25 RX ADMIN — POTASSIUM CHLORIDE AND SODIUM CHLORIDE SCH MLS/HR: 900; 150 INJECTION, SOLUTION INTRAVENOUS at 11:06

## 2020-05-25 RX ADMIN — POTASSIUM CHLORIDE AND SODIUM CHLORIDE SCH MLS/HR: 900; 150 INJECTION, SOLUTION INTRAVENOUS at 23:08

## 2020-05-25 RX ADMIN — METOPROLOL TARTRATE SCH MG: 25 TABLET, FILM COATED ORAL at 11:05

## 2020-05-25 RX ADMIN — METOPROLOL TARTRATE SCH MG: 50 TABLET, FILM COATED ORAL at 21:14

## 2020-05-25 RX ADMIN — OMEPRAZOLE SCH MG: 20 CAPSULE, DELAYED RELEASE ORAL at 21:13

## 2020-05-25 RX ADMIN — OMEPRAZOLE SCH MG: 20 CAPSULE, DELAYED RELEASE ORAL at 11:05

## 2020-05-25 RX ADMIN — Medication SCH MG: at 21:12

## 2020-05-25 RX ADMIN — LEVOTHYROXINE SODIUM SCH MCG: 50 TABLET ORAL at 05:35

## 2020-05-25 NOTE — IPNPDOC
Text Note


Date of Service


The patient was seen on 5/25/20.





NOTE


Subjective: No any acute event overnight.


 Patient denied any fever or chills, nausea, vomiting or dysuria.





Objective:


VITAL SIGNS: Please see below.


GENERAL: awake, alert, NAD


HEENT: NCAT, anicteric sclera, TORI


NECK: supple, no JVD


CARDIOVASCULAR EXAMINATION: Irregularly irregular, tachycardic at rate 110


RESPIRATORY EXAMINATION: CTA b/l, no wheezes/rales/rhonchi


ABDOMINAL EXAMINATION: positive bowel sounds x 4, NT


EXTREMITIES: no cyanosis, clubbing, edema


SKIN: warm, no rashes.


NEUROLOGICAL EXAMINATION: AAO x 3, no motor/sensory deficits


PSYCHIATRIC EXAMINATION: calm, normal affect





Assessment/Plan


Patient's 80 years old female with past medical history of diverticulitis 

abscess perforation, dementia, hypothyroidism, atrial fibrillation, CVA 

presented to the hospital after she was found unconsciousness in the nursing 

home in atrial fibrillation with rapid ventricular rate. On arrival patient was 

in rigors with rapid ventricular rate up to 190. Of note patient was recently 

discharged from the hospital after she was found to have perforated viscus 

secondary to perforated diverticular due to diverticulitis, status post IR 

drainage. Patient was discharged from the hospital with course of antibiotics


In emergency room patient was found to have hypotension of 80/40, leukocytosis 

of 16.9, hemoglobin of 11.3, TSH 14.1, K 2.9, lactic acid 4.6. CT of abdomen s

howed significant right kidney hydronephrosis.





Problems





(1) Sepsis


Resolved


Patient developed severe sepsis with hypotension, leukocytosis and lactic 

acidosis on admission


Most likely secondary to abdominal abscess


On 5/21/20 blood pressure improved after IV fluid


DC Cefepime IV, vancomycin IV DC


Ceftriaxone IV started on 5/23/20


blood culture positive for gram-negative Escherichia coli, abscess culture 

positive for Escherichia coli and Streptococcus mitis


Repeat blood culture negative





(2) Intra-abdominal abscess


CT showed phlegmonous change in the right pelvis due to prior perforated sigmoid


diverticulitis with what appears to be multiloculated air/fluid in the right 

pelvis


Dr. Carey talked to patient about possible surgery, patient declined surgical 

intervention


abscess by IR US abscess was drained on 5/21/20, continuous drainage placed


abscess culture positive for Escherichia coli


on 5/25/2020 CT scan was done, result pending


CEA pending, there is concern for colon cancer, given positive stool for occult 

blood. Patient could have perforation secondary malignancies


Colonoscopy in the outpatient settings





(3) Hypokalemia


Resolved





(4) Atrial fibrillation with RVR


Most likely secondary to sepsis


Lopressor IV when necessary


Anticoagulation on hold due to GI bleed


I increased her dose of beta blockers, heart rate is under control





Right kidney hydronephrosis


Most likely secondary to abdominal abscess, hopefully it ll be resolved after 

drain.


Await CT scan results





Normocytic anemia


Multifactorial, superimposed with GI blood loss


Patient has low iron. We will start iron supplementation


B12 and folate pending





Electrolyte imbalance


K replaced





VS,Fishbone, I+O


VS, Fishbone, I+O


Laboratory Tests


5/25/20 06:10








5/25/20 06:12











Vital Signs








  Date Time  Temp Pulse Resp B/P (MAP) Pulse Ox O2 Delivery O2 Flow Rate FiO2


 


5/25/20 14:00 97.2 105 19 114/74 (87) 97 Room Air  


 


5/20/20 11:45       10.0 














I&O- Last 24 Hours up to 6 AM 


 


 5/25/20





 06:00


 


Intake Total 2400 ml


 


Output Total 5 ml


 


Balance 2395 ml

















LACY RAYMOND DO            May 25, 2020 14:23

## 2020-05-26 VITALS — DIASTOLIC BLOOD PRESSURE: 70 MMHG | SYSTOLIC BLOOD PRESSURE: 110 MMHG

## 2020-05-26 VITALS — DIASTOLIC BLOOD PRESSURE: 68 MMHG | SYSTOLIC BLOOD PRESSURE: 112 MMHG

## 2020-05-26 VITALS — SYSTOLIC BLOOD PRESSURE: 107 MMHG | DIASTOLIC BLOOD PRESSURE: 70 MMHG

## 2020-05-26 LAB
BUN SERPL-MCNC: 4 MG/DL (ref 7–18)
CALCIUM SERPL-MCNC: 7.4 MG/DL (ref 8.8–10.2)
CHLORIDE SERPL-SCNC: 103 MEQ/L (ref 98–107)
CO2 SERPL-SCNC: 29 MEQ/L (ref 21–32)
CREAT SERPL-MCNC: 0.24 MG/DL (ref 0.55–1.3)
FOLATE SERPL-MCNC: 9.2 NG/ML (ref 5.4–?)
GFR SERPL CREATININE-BSD FRML MDRD: > 60 ML/MIN/{1.73_M2} (ref 39–?)
GLUCOSE SERPL-MCNC: 78 MG/DL (ref 70–100)
HCT VFR BLD AUTO: 24.2 % (ref 36–47)
HGB BLD-MCNC: 8 G/DL (ref 12–15.5)
MCH RBC QN AUTO: 27.5 PG (ref 27–33)
MCHC RBC AUTO-ENTMCNC: 33.1 G/DL (ref 32–36.5)
MCV RBC AUTO: 83.2 FL (ref 80–96)
PLATELET # BLD AUTO: 301 10^3/UL (ref 150–450)
POTASSIUM SERPL-SCNC: 3.1 MEQ/L (ref 3.5–5.1)
RBC # BLD AUTO: 2.91 10^6/UL (ref 4–5.4)
SODIUM SERPL-SCNC: 138 MEQ/L (ref 136–145)
VIT B12 SERPL-MCNC: 580 PG/ML (ref 247–911)
WBC # BLD AUTO: 8.3 10^3/UL (ref 4–10)

## 2020-05-26 RX ADMIN — POTASSIUM CHLORIDE AND SODIUM CHLORIDE SCH MLS/HR: 900; 150 INJECTION, SOLUTION INTRAVENOUS at 12:07

## 2020-05-26 RX ADMIN — METOPROLOL TARTRATE SCH MG: 50 TABLET, FILM COATED ORAL at 09:37

## 2020-05-26 RX ADMIN — Medication SCH MG: at 09:36

## 2020-05-26 RX ADMIN — OMEPRAZOLE SCH MG: 20 CAPSULE, DELAYED RELEASE ORAL at 20:12

## 2020-05-26 RX ADMIN — ATORVASTATIN CALCIUM SCH MG: 20 TABLET, FILM COATED ORAL at 20:12

## 2020-05-26 RX ADMIN — LEVOTHYROXINE SODIUM SCH MCG: 50 TABLET ORAL at 05:44

## 2020-05-26 RX ADMIN — OMEPRAZOLE SCH MG: 20 CAPSULE, DELAYED RELEASE ORAL at 09:36

## 2020-05-26 RX ADMIN — Medication SCH MG: at 20:12

## 2020-05-26 RX ADMIN — SERTRALINE HYDROCHLORIDE SCH MG: 50 TABLET ORAL at 09:36

## 2020-05-26 RX ADMIN — METOPROLOL TARTRATE SCH MG: 50 TABLET, FILM COATED ORAL at 21:00

## 2020-05-26 RX ADMIN — CEFTRIAXONE SCH MLS/HR: 1 INJECTION, POWDER, FOR SOLUTION INTRAMUSCULAR; INTRAVENOUS at 12:46

## 2020-05-26 RX ADMIN — CEFTRIAXONE SCH MLS/HR: 2 INJECTION, POWDER, FOR SOLUTION INTRAMUSCULAR; INTRAVENOUS at 20:13

## 2020-05-26 RX ADMIN — METRONIDAZOLE SCH MG: 500 TABLET ORAL at 20:12

## 2020-05-26 NOTE — IPNPDOC
Text Note


Date of Service


The patient was seen on 5/26/20.





NOTE


Patient revisited today. She seems to have done well post-percutaneous drainage 

of her intraabdominal abscess. She had a follow up CT scan of the abdomen and 

pelvis with contrast done yesterday. Not ready officially yet, but I reviewed 

the images myself. Shows improvement/decrease of the abscess cavity, persistence

of the right hydronephrosis. Her leukocytosis has resolved. She has been 

afebrile. She is tolerating diet and she does not complain of any abdominal 

discomfort.





HD stable, afebrile. 


Abdominal exam shows benign, nodistended, nontender abdomen. Right side perc 

drain with small amount of brownish, purulent material in tube and bag. Past 3 

days this has been 40/0/5mL drainage.








diverticular abscess (probably)


I think we can switch to oral antibiotics depending on the sensitivity of the 

drainage cultures. Her blood cultures have been negative now and was growing 

similar organisms (e.coli) from the drain cultures. There is improvement of the 

abscess cavity with decreased drainage but would keep the drain until we can 

demonstrate that the cavity will remain collapsed. She can follow up with me in 

2 weeks to schedule colonoscopy. She still does not want any surgery with 

regards to the colon; thus may need to keep the drain so long as the cavity of 

the abscess does not collapse by itself and depending on the etiology of the 

perforation (diverticular vs malignancy.)





VS,Fishbone, I+O


VS, Fishbone, I+O


Laboratory Tests


5/26/20 05:37








5/26/20 07:59











Vital Signs








  Date Time  Temp Pulse Resp B/P (MAP) Pulse Ox O2 Delivery O2 Flow Rate FiO2


 


5/26/20 09:37  90  116/72    


 


5/26/20 06:00 98.2  16  95 Room Air  


 


5/20/20 11:45       10.0 














I&O- Last 24 Hours up to 6 AM 


 


 5/26/20





 06:00


 


Intake Total 560 ml


 


Output Total 0 ml


 


Balance 560 ml

















ANANDA RIVAS MD         May 26, 2020 13:34

## 2020-05-26 NOTE — IPNPDOC
Text Note


Date of Service


The patient was seen on 5/26/20.





NOTE


Subjective: No any acute event overnight. Past 3 days this has been 40/0/5mL 

drainage.


Patient denied any fever or chills, nausea, vomiting or dysuria.





Objective:


VITAL SIGNS: Please see below.


GENERAL: awake, alert, NAD


HEENT: NCAT, anicteric sclera, TORI


NECK: supple, no JVD


CARDIOVASCULAR EXAMINATION: Irregularly irregular


RESPIRATORY EXAMINATION: CTA b/l, no wheezes/rales/rhonchi


ABDOMINAL EXAMINATION: positive bowel sounds x 4, NT


EXTREMITIES: no cyanosis, clubbing, edema


SKIN: warm, no rashes.


NEUROLOGICAL EXAMINATION: AAO x 3, no motor/sensory deficits


PSYCHIATRIC EXAMINATION: calm, normal affect





Assessment/Plan


Patient's 80 years old female with past medical history of diverticulitis 

abscess perforation, dementia, hypothyroidism, atrial fibrillation, CVA p

resented to the hospital after she was found unconsciousness in the nursing home

in atrial fibrillation with rapid ventricular rate. On arrival patient was in 

rigors with rapid ventricular rate up to 190. Of note patient was recently 

discharged from the hospital after she was found to have perforated viscus 

secondary to perforated diverticular due to diverticulitis, status post IR 

drainage. Patient was discharged from the hospital with course of antibiotics


In emergency room patient was found to have hypotension of 80/40, leukocytosis 

of 16.9, hemoglobin of 11.3, TSH 14.1, K 2.9, lactic acid 4.6. CT of abdomen 

showed significant right kidney hydronephrosis.





Problems





(1) Sepsis


Resolved


Patient developed severe sepsis with hypotension, leukocytosis and lactic 

acidosis on admission


Most likely secondary to abdominal abscess


On 5/21/20 blood pressure improved after IV fluid


DC Cefepime IV, vancomycin IV DC


Ceftriaxone IV started on 5/23/20


blood culture positive for gram-negative Escherichia coli, abscess culture 

positive for Escherichia coli and Streptococcus mitis


Repeat blood culture negative





(2) Intra-abdominal abscess


CT showed phlegmonous change in the right pelvis due to prior perforated sigmoid


diverticulitis with what appears to be multiloculated air/fluid in the right p

katey Carey talked to patient about possible surgery, patient declined surgical 

intervention


abscess by IR US abscess was drained on 5/21/20, continuous drainage placed


abscess culture positive for Escherichia coli


on 5/25/2020 CT scan was done, result pending


CEA wnl, there is concern for colon cancer, given positive stool for occult 

blood. Patient could have perforation secondary malignancies.


Colonoscopy in the outpatient settings 





(3) Hypokalemia


replaced





(4) Atrial fibrillation with RVR


Most likely secondary to sepsis is 


Lopressor IV when necessary


Anticoagulation on hold due to GI bleed


I increased her dose of beta blockers, heart rate is under control





Right kidney hydronephrosis


Most likely secondary to abdominal abscess


Await CT scan results





Normocytic anemia


Multifactorial, superimposed with GI blood loss


Patient has low iron. We will start iron supplementation


B12 and folate wnl





Electrolyte imbalance


K replaced





VS,Fishbone, I+O


VS, Fishbone, I+O


Laboratory Tests


5/26/20 05:37








5/26/20 07:59











Vital Signs








  Date Time  Temp Pulse Resp B/P (MAP) Pulse Ox O2 Delivery O2 Flow Rate FiO2


 


5/26/20 09:37  90  116/72    


 


5/26/20 06:00 98.2  16  95 Room Air  


 


5/20/20 11:45       10.0 














I&O- Last 24 Hours up to 6 AM 


 


 5/26/20





 06:00


 


Intake Total 560 ml


 


Output Total 0 ml


 


Balance 560 ml

















LACY RAYMOND DO            May 26, 2020 14:08

## 2020-05-27 VITALS — DIASTOLIC BLOOD PRESSURE: 66 MMHG | SYSTOLIC BLOOD PRESSURE: 126 MMHG

## 2020-05-27 VITALS — SYSTOLIC BLOOD PRESSURE: 112 MMHG | DIASTOLIC BLOOD PRESSURE: 71 MMHG

## 2020-05-27 VITALS — SYSTOLIC BLOOD PRESSURE: 110 MMHG | DIASTOLIC BLOOD PRESSURE: 62 MMHG

## 2020-05-27 VITALS — SYSTOLIC BLOOD PRESSURE: 117 MMHG | DIASTOLIC BLOOD PRESSURE: 66 MMHG

## 2020-05-27 LAB
BUN SERPL-MCNC: 4 MG/DL (ref 7–18)
CALCIUM SERPL-MCNC: 7.5 MG/DL (ref 8.8–10.2)
CHLORIDE SERPL-SCNC: 103 MEQ/L (ref 98–107)
CO2 SERPL-SCNC: 28 MEQ/L (ref 21–32)
CREAT SERPL-MCNC: 0.32 MG/DL (ref 0.55–1.3)
GFR SERPL CREATININE-BSD FRML MDRD: > 60 ML/MIN/{1.73_M2} (ref 39–?)
GLUCOSE SERPL-MCNC: 75 MG/DL (ref 70–100)
HCT VFR BLD AUTO: 26.1 % (ref 36–47)
HGB BLD-MCNC: 8.7 G/DL (ref 12–15.5)
MAGNESIUM SERPL-MCNC: 1.2 MG/DL (ref 1.8–2.4)
MCH RBC QN AUTO: 27.5 PG (ref 27–33)
MCHC RBC AUTO-ENTMCNC: 33.3 G/DL (ref 32–36.5)
MCV RBC AUTO: 82.6 FL (ref 80–96)
PLATELET # BLD AUTO: 337 10^3/UL (ref 150–450)
POTASSIUM SERPL-SCNC: 3.5 MEQ/L (ref 3.5–5.1)
RBC # BLD AUTO: 3.16 10^6/UL (ref 4–5.4)
SODIUM SERPL-SCNC: 139 MEQ/L (ref 136–145)
WBC # BLD AUTO: 7 10^3/UL (ref 4–10)

## 2020-05-27 PROCEDURE — 05H933Z INSERTION OF INFUSION DEVICE INTO RIGHT BRACHIAL VEIN, PERCUTANEOUS APPROACH: ICD-10-PCS | Performed by: RADIOLOGY

## 2020-05-27 RX ADMIN — Medication SCH UNITS: at 17:19

## 2020-05-27 RX ADMIN — Medication SCH MG: at 20:37

## 2020-05-27 RX ADMIN — METRONIDAZOLE SCH MG: 500 TABLET ORAL at 16:17

## 2020-05-27 RX ADMIN — SODIUM CHLORIDE SCH ML: 9 INJECTION, SOLUTION INTRAMUSCULAR; INTRAVENOUS; SUBCUTANEOUS at 17:20

## 2020-05-27 RX ADMIN — LEVOTHYROXINE SODIUM SCH MCG: 50 TABLET ORAL at 05:53

## 2020-05-27 RX ADMIN — METRONIDAZOLE SCH MG: 500 TABLET ORAL at 20:37

## 2020-05-27 RX ADMIN — POTASSIUM CHLORIDE AND SODIUM CHLORIDE SCH MLS/HR: 900; 150 INJECTION, SOLUTION INTRAVENOUS at 00:36

## 2020-05-27 RX ADMIN — ATORVASTATIN CALCIUM SCH MG: 20 TABLET, FILM COATED ORAL at 20:38

## 2020-05-27 RX ADMIN — OMEPRAZOLE SCH MG: 20 CAPSULE, DELAYED RELEASE ORAL at 09:06

## 2020-05-27 RX ADMIN — CEFTRIAXONE SCH MLS/HR: 2 INJECTION, POWDER, FOR SOLUTION INTRAMUSCULAR; INTRAVENOUS at 20:39

## 2020-05-27 RX ADMIN — OMEPRAZOLE SCH MG: 20 CAPSULE, DELAYED RELEASE ORAL at 20:39

## 2020-05-27 RX ADMIN — SERTRALINE HYDROCHLORIDE SCH MG: 50 TABLET ORAL at 09:06

## 2020-05-27 RX ADMIN — METRONIDAZOLE SCH MG: 500 TABLET ORAL at 09:06

## 2020-05-27 RX ADMIN — POTASSIUM CHLORIDE AND SODIUM CHLORIDE SCH MLS/HR: 900; 150 INJECTION, SOLUTION INTRAVENOUS at 14:08

## 2020-05-27 RX ADMIN — METOPROLOL TARTRATE SCH MG: 50 TABLET, FILM COATED ORAL at 20:38

## 2020-05-27 RX ADMIN — METOPROLOL TARTRATE SCH MG: 50 TABLET, FILM COATED ORAL at 09:09

## 2020-05-27 RX ADMIN — Medication SCH MG: at 09:06

## 2020-05-27 NOTE — IPNPDOC
Text Note


Date of Service


The patient was seen on 5/27/20.





NOTE


Subjective: No any acute event overnight. Patient denied any fever or chills, 

nausea, vomiting or dysuria.





Objective:


VITAL SIGNS: Please see below.


GENERAL: awake, alert, NAD


HEENT: NCAT, anicteric sclera, PERRLA


NECK: supple, no JVD


CARDIOVASCULAR EXAMINATION: Irregularly irregular


RESPIRATORY EXAMINATION: CTA b/l, no wheezes/rales/rhonchi


ABDOMINAL EXAMINATION: positive bowel sounds x 4, NT


EXTREMITIES: no cyanosis, clubbing, edema


SKIN: warm, no rashes.


NEUROLOGICAL EXAMINATION: AAO x 3, no motor/sensory deficits


PSYCHIATRIC EXAMINATION: calm, normal affect





Assessment/Plan


Patient's 80 years old female with past medical history of diverticulitis 

abscess perforation, dementia, hypothyroidism, atrial fibrillation, CVA 

presented to the hospital after she was found unconsciousness in the nursing 

home in atrial fibrillation with rapid ventricular rate. On arrival patient was 

in rigors with rapid ventricular rate up to 190. Of note patient was recently 

discharged from the hospital after she was found to have perforated viscus 

secondary to perforated diverticular due to diverticulitis, status post IR 

drainage. Patient was discharged from the hospital with course of antibiotics


In emergency room patient was found to have hypotension of 80/40, leukocytosis 

of 16.9, hemoglobin of 11.3, TSH 14.1, K 2.9, lactic acid 4.6. CT of abdomen 

showed significant right kidney hydronephrosis.





Problems





(1) Sepsis


Resolved


Patient developed severe sepsis with hypotension, leukocytosis and lactic 

acidosis on admission


Most likely secondary to abdominal abscess


On 5/21/20 blood pressure improved after IV fluid


DC Cefepime IV, vancomycin IV DC


Ceftriaxone IV started on 5/23/20, Flagyl started on 5/26/20 to cover 

Bacterioids


blood culture positive for gram-negative Escherichia coli, abscess culture 

positive for Escherichia coli and Streptococcus mitis


Repeat blood culture negative





(2) Intra-abdominal abscess


CT showed phlegmonous change in the right pelvis due to prior perforated sigmoid


diverticulitis with what appears to be multiloculated air/fluid in the right 

pelvis


Dr. Carey talked to patient about possible surgery, patient declined surgical 

intervention


abscess by IR US abscess was drained on 5/21/20, continuous drainage placed


abscess culture positive for Escherichia coli


on 5/25/2020 CT scan was done, result pending


CEA wnl, there is concern for colon cancer, given positive stool for occult 

blood. Patient could have perforation secondary malignancies.


Colonoscopy in the outpatient settings 





(3) Hypokalemia


replaced





(4) Atrial fibrillation with RVR


Most likely secondary to sepsis is 


Lopressor IV when necessary


Anticoagulation on hold due to GI bleed


I increased her dose of beta blockers, heart rate is under control





Right kidney hydronephrosis


Most likely secondary to abdominal abscess


Await CT scan results





Normocytic anemia


Multifactorial, superimposed with GI blood loss


Patient has low iron. We will start iron supplementation


B12 and folate wnl





Electrolyte imbalance


Magnesium replaced





VS,Fishbone, I+O


VS, Fishbone, I+O


Laboratory Tests


5/27/20 05:30











Vital Signs








  Date Time  Temp Pulse Resp B/P (MAP) Pulse Ox O2 Delivery O2 Flow Rate FiO2


 


5/27/20 09:09  109  115/77    


 


5/27/20 06:00 98.9  17  96 Room Air  














I&O- Last 24 Hours up to 6 AM 


 


 5/27/20





 06:00


 


Intake Total 890 ml


 


Balance 890 ml

















LACY RAYMOND DO            May 27, 2020 14:56

## 2020-05-27 NOTE — CR
DATE OF CONSULTATION:  2020

 

I was asked to consult by Dr. Martins for evaluation of intra-abdominal abscess

from perforated viscus and choice of IV antibiotics.

 

HISTORY OF PRESENT ILLNESS:

Mrs. Cobb is a 80-year-old female whose history dates back to 2020 when she

was admitted with a perforated viscus and an abdominal abscess.  The patient had

free air in the abdomen and a phlegmon in the right pelvis which was about 9.5 cm

and a large hiatus hernia.  The patient had a drainage procedure and culture had

E. Coli, Bacteroides, Streptococcus, Klebsiella and anaerobes.  The patient was

treated with IV ciprofloxacin and Flagyl and then discharged.  She was discharged

on 2020.  She came back on 2020 with persistent symptoms.  On

2020, she had a positive urine culture with E. Coli.  The patient was

transferred to [facility] on the  and readmitted on the 

with septic shock and hypotension.  She was placed on pressors.  Blood pressure

was 80/40.  White count was 11.4.  She was afebrile.  She was started on IV

cefepime with vancomycin on 2020.  On 2020, she was switched IV

ceftriaxone.  Another drainage procedure was done and a drain was placed on

2020.  Culture was positive for E-coli, Streptococcus mitis and Bacteroides

two different species, and blood cultures were positive for E. Coli.  She was

followed up by Dr. Trinidad and he recommended to keep the drainage tube until

the cavity has completely collapsed and she is to follow up in his office in 2

weeks for colonoscopy.  She does not want any surgery.

 

PAST MEDICAL HISTORY:

Significant for right-sided hydronephrosis found to be related to the phlegmon,

perforated viscus from diverticula with diverticular abscess status post drainage

twice, hyponatremia, hypokalemia, chronic atrial fibrillation, hypothyroidism,

history of CVA from patent foramen ovale, gastroesophageal reflux disease,

disease, hyperlipidemia, and dementia.

 

PAST SURGICAL HISTORY:

Drainage of diverticular abscess twice, right total hip replacements, 

section, left rotator cuff surgery, transverse carpal tunnel release.

 

FAMILY HISTORY:  Nonrevealing.

 

SOCIAL HISTORY:

She was  twice.  She lives alone now with her cat in [place].  Her daughter

is close by.  She had two kids from her first marriage and one from her second

marriage.

 

ALLERGIES:

No known drug allergies.

 

MEDICATIONS:

-  ceftriaxone 1 gram IV q.12 hours

-  metoprolol 50 mg by mouth twice a day

-  Niferex-150 mg by mouth twice a day

-  Benadryl as needed

-  levothyroxine 15 mcg daily

-  Lipitor 40 mg by mouth at bedtime

-  Prilosec mg by mouth twice a day

-  IV potassium given today

-  Tylenol as needed

 

REVIEW OF SYSTEMS:

She denies any nausea, vomiting or diarrhea.  She does have a decreased appetite.

No dysuria, hematuria or flank pain.  No upper or lower extremity weakness.  She

is a little confused.

 

LABORATORY DATA:

White count is 8.3, hemoglobin 8, hematocrit 24.2 and platelets 301.  Sodium 138,

potassium 3.1, chloride 103, bicarb 29, BUN 4, creatinine 0.24, glucose 78,

calcium 7.4 Clostridium difficile was negative on 2020.  COVID-19 was

negative on 2020.

 

IMAGING STUDIES:

CT of abdomen and pelvis was done on 2020, I do not have a read on it.  On

2020, CT showed a right abdominal abscess originally seen on 2020

with diameter measuring 9.5 cm.  Continues to be mostly air, but some fluid.

Also suspect continuous phlegmonous changes in the right pelvis due to prior

perforated sigmoid diverticulitis with what appears to be multiloculated air

fluid level in the right pelvis.  There is free fluid in the pelvis and moderate

right-sided hydronephrosis.

 

PHYSICAL EXAMINATION:

Pleasant, elderly female in no acute distress.

Temperature is 98.1, pulse 104 irregular, respirations 20, blood pressure 107/70,

O2 sat 97% on room air.

Heart:  Normal S1, S2.  Tachycardiac, irregular.  Systolic ejection murmur 2/6

left upper sternal border.

Lungs are clear.  No wheezes, rales or rhonchi.

Abdomen is soft, nontender.  No hepatosplenomegaly.

Extremities:  No clubbing or cyanosis.  +1 pitting edema bilaterally.  Mild left

calf tenderness, but no redness.  Right upper quadrant has a drain in place with

purulent drainage, somewhat bloody.

 

IMPRESSION:

This is a 80-year-old female with a history of CVA, acute diverticulitis

diagnosed on 2020 who was admitted multiple times with persistent symptoms

and intra-abdominal abscess.  She is currently on IV ceftriaxone, but has not

received any anaerobic coverage for bacteroides.  Due to the extensive abscess

and multiple recurrent admissions, I would suggest continuing IV antibiotics at

the nursing home.  She has multiple different pathogens and needs anaerobic

coverage.

 

PLAN;

Place midline in interventional radiology tomorrow.  Switch IV Rocephin to 2

grams q.24 hours.  Start by mouth Flagyl 500 mg by mouth three times a day.

Follow up with CBC, CMP, ESR, and CRP weekly.  Obtain followup CT of abdomen and

pelvis in 2 weeks to decide when the drain can be removed and followup with Dr. Trinidad.

 

The case discussed with Dr. Martins who agrees with the plan to transfer back

to nursing home tomorrow.

## 2020-05-28 VITALS — DIASTOLIC BLOOD PRESSURE: 79 MMHG | SYSTOLIC BLOOD PRESSURE: 109 MMHG

## 2020-05-28 VITALS — SYSTOLIC BLOOD PRESSURE: 105 MMHG | DIASTOLIC BLOOD PRESSURE: 61 MMHG

## 2020-05-28 VITALS — SYSTOLIC BLOOD PRESSURE: 120 MMHG | DIASTOLIC BLOOD PRESSURE: 73 MMHG

## 2020-05-28 LAB
BUN SERPL-MCNC: 5 MG/DL (ref 7–18)
CALCIUM SERPL-MCNC: 7.5 MG/DL (ref 8.8–10.2)
CHLORIDE SERPL-SCNC: 104 MEQ/L (ref 98–107)
CO2 SERPL-SCNC: 28 MEQ/L (ref 21–32)
CREAT SERPL-MCNC: 0.33 MG/DL (ref 0.55–1.3)
GFR SERPL CREATININE-BSD FRML MDRD: > 60 ML/MIN/{1.73_M2} (ref 39–?)
GLUCOSE SERPL-MCNC: 75 MG/DL (ref 70–100)
HCT VFR BLD AUTO: 25.4 % (ref 36–47)
HGB BLD-MCNC: 8.4 G/DL (ref 12–15.5)
MAGNESIUM SERPL-MCNC: 1.5 MG/DL (ref 1.8–2.4)
MCH RBC QN AUTO: 27.8 PG (ref 27–33)
MCHC RBC AUTO-ENTMCNC: 33.1 G/DL (ref 32–36.5)
MCV RBC AUTO: 84.1 FL (ref 80–96)
PLATELET # BLD AUTO: 325 10^3/UL (ref 150–450)
POTASSIUM SERPL-SCNC: 3.9 MEQ/L (ref 3.5–5.1)
RBC # BLD AUTO: 3.02 10^6/UL (ref 4–5.4)
SODIUM SERPL-SCNC: 136 MEQ/L (ref 136–145)
WBC # BLD AUTO: 10.6 10^3/UL (ref 4–10)

## 2020-05-28 RX ADMIN — SODIUM CHLORIDE SCH ML: 9 INJECTION, SOLUTION INTRAMUSCULAR; INTRAVENOUS; SUBCUTANEOUS at 18:47

## 2020-05-28 RX ADMIN — OMEPRAZOLE SCH MG: 20 CAPSULE, DELAYED RELEASE ORAL at 09:41

## 2020-05-28 RX ADMIN — Medication SCH MG: at 20:27

## 2020-05-28 RX ADMIN — ATORVASTATIN CALCIUM SCH MG: 20 TABLET, FILM COATED ORAL at 20:28

## 2020-05-28 RX ADMIN — LEVOTHYROXINE SODIUM SCH MCG: 50 TABLET ORAL at 06:17

## 2020-05-28 RX ADMIN — METRONIDAZOLE SCH MG: 500 TABLET ORAL at 20:28

## 2020-05-28 RX ADMIN — CEFTRIAXONE SCH MLS/HR: 2 INJECTION, POWDER, FOR SOLUTION INTRAMUSCULAR; INTRAVENOUS at 20:28

## 2020-05-28 RX ADMIN — Medication SCH MG: at 09:41

## 2020-05-28 RX ADMIN — POTASSIUM CHLORIDE AND SODIUM CHLORIDE SCH MLS/HR: 900; 150 INJECTION, SOLUTION INTRAVENOUS at 16:10

## 2020-05-28 RX ADMIN — METRONIDAZOLE SCH MG: 500 TABLET ORAL at 09:41

## 2020-05-28 RX ADMIN — METRONIDAZOLE SCH MG: 500 TABLET ORAL at 16:10

## 2020-05-28 RX ADMIN — METOPROLOL TARTRATE SCH MG: 50 TABLET, FILM COATED ORAL at 09:42

## 2020-05-28 RX ADMIN — OMEPRAZOLE SCH MG: 20 CAPSULE, DELAYED RELEASE ORAL at 20:28

## 2020-05-28 RX ADMIN — Medication SCH UNITS: at 06:17

## 2020-05-28 RX ADMIN — Medication SCH MG: at 13:35

## 2020-05-28 RX ADMIN — METOPROLOL TARTRATE SCH MG: 50 TABLET, FILM COATED ORAL at 20:27

## 2020-05-28 RX ADMIN — SERTRALINE HYDROCHLORIDE SCH MG: 50 TABLET ORAL at 09:41

## 2020-05-28 RX ADMIN — POTASSIUM CHLORIDE AND SODIUM CHLORIDE SCH MLS/HR: 900; 150 INJECTION, SOLUTION INTRAVENOUS at 03:23

## 2020-05-28 RX ADMIN — SODIUM CHLORIDE SCH ML: 9 INJECTION, SOLUTION INTRAMUSCULAR; INTRAVENOUS; SUBCUTANEOUS at 06:17

## 2020-05-28 RX ADMIN — Medication SCH UNITS: at 18:47

## 2020-05-28 NOTE — REP
MIDLINE CATHETER INSERTION WITH SITE RITE

 

The procedure was performed under the direct supervision of Dr. Suarez.

 

The risks and benefits of the procedure were explained to the patient and

informed consent was obtained.

 

The right brachial vein was localized using ultrasound guidance.  The skin was

prepped and draped in a sterile fashion.  1% lidocaine was used as a local

anesthetic.  Using ultrasound guidance the brachial vein was cannulated and a

0.018 guidewire was inserted.  The needle was removed and a 5.5 Arabic dilator

and peel-away sheath was inserted over the guide wire.  A 5-Arabic dual lumen

catheter was left at a length of 16.5 cm.  The dilator was removed and the

catheter was inserted over the guide wire.  The peel-away sheath was removed and

the catheter was flushed with heparinized saline as per Hospital protocol.  The

catheter was affixed to the skin and a sterile dressing was applied.

 

The patient tolerated the procedure well and there were no immediate

complications.

 

 

Electronically Signed by

WILSON Betts 05/27/2020 05:19 P

Electronically Signed by

Kristian Suarez MD 05/28/2020 11:05 A

## 2020-05-29 VITALS — DIASTOLIC BLOOD PRESSURE: 70 MMHG | SYSTOLIC BLOOD PRESSURE: 119 MMHG

## 2020-05-29 LAB
BUN SERPL-MCNC: 5 MG/DL (ref 7–18)
CALCIUM SERPL-MCNC: 7.4 MG/DL (ref 8.8–10.2)
CHLORIDE SERPL-SCNC: 104 MEQ/L (ref 98–107)
CO2 SERPL-SCNC: 26 MEQ/L (ref 21–32)
CREAT SERPL-MCNC: 0.29 MG/DL (ref 0.55–1.3)
CRP SERPL-MCNC: 1.63 MG/DL (ref 0–0.3)
GFR SERPL CREATININE-BSD FRML MDRD: > 60 ML/MIN/{1.73_M2} (ref 39–?)
GLUCOSE SERPL-MCNC: 77 MG/DL (ref 70–100)
MAGNESIUM SERPL-MCNC: 1.5 MG/DL (ref 1.8–2.4)
POTASSIUM SERPL-SCNC: 3.6 MEQ/L (ref 3.5–5.1)
SODIUM SERPL-SCNC: 134 MEQ/L (ref 136–145)

## 2020-05-29 RX ADMIN — APIXABAN SCH MG: 5 TABLET, FILM COATED ORAL at 20:32

## 2020-05-29 RX ADMIN — SODIUM CHLORIDE SCH ML: 9 INJECTION, SOLUTION INTRAMUSCULAR; INTRAVENOUS; SUBCUTANEOUS at 05:33

## 2020-05-29 RX ADMIN — Medication SCH UNITS: at 05:33

## 2020-05-29 RX ADMIN — METOPROLOL TARTRATE SCH MG: 50 TABLET, FILM COATED ORAL at 20:32

## 2020-05-29 RX ADMIN — Medication PRN UNITS: at 10:55

## 2020-05-29 RX ADMIN — Medication SCH MG: at 20:32

## 2020-05-29 RX ADMIN — SODIUM CHLORIDE SCH ML: 9 INJECTION, SOLUTION INTRAMUSCULAR; INTRAVENOUS; SUBCUTANEOUS at 17:23

## 2020-05-29 RX ADMIN — SODIUM CHLORIDE PRN ML: 9 INJECTION, SOLUTION INTRAMUSCULAR; INTRAVENOUS; SUBCUTANEOUS at 10:55

## 2020-05-29 RX ADMIN — Medication PRN UNITS: at 12:34

## 2020-05-29 RX ADMIN — METRONIDAZOLE SCH MG: 500 TABLET ORAL at 16:16

## 2020-05-29 RX ADMIN — Medication PRN UNITS: at 21:39

## 2020-05-29 RX ADMIN — POTASSIUM CHLORIDE AND SODIUM CHLORIDE SCH MLS/HR: 900; 150 INJECTION, SOLUTION INTRAVENOUS at 04:23

## 2020-05-29 RX ADMIN — OMEPRAZOLE SCH MG: 20 CAPSULE, DELAYED RELEASE ORAL at 08:55

## 2020-05-29 RX ADMIN — CEFTRIAXONE SCH MLS/HR: 2 INJECTION, POWDER, FOR SOLUTION INTRAMUSCULAR; INTRAVENOUS at 20:31

## 2020-05-29 RX ADMIN — Medication SCH MG: at 08:55

## 2020-05-29 RX ADMIN — LEVOTHYROXINE SODIUM SCH MCG: 50 TABLET ORAL at 05:33

## 2020-05-29 RX ADMIN — Medication SCH UNITS: at 17:23

## 2020-05-29 RX ADMIN — OMEPRAZOLE SCH MG: 20 CAPSULE, DELAYED RELEASE ORAL at 20:32

## 2020-05-29 RX ADMIN — SODIUM CHLORIDE PRN ML: 9 INJECTION, SOLUTION INTRAMUSCULAR; INTRAVENOUS; SUBCUTANEOUS at 12:34

## 2020-05-29 RX ADMIN — METRONIDAZOLE SCH MG: 500 TABLET ORAL at 20:33

## 2020-05-29 RX ADMIN — ACETAMINOPHEN PRN MG: 325 TABLET ORAL at 04:23

## 2020-05-29 RX ADMIN — SERTRALINE HYDROCHLORIDE SCH MG: 50 TABLET ORAL at 08:55

## 2020-05-29 RX ADMIN — SODIUM CHLORIDE PRN ML: 9 INJECTION, SOLUTION INTRAMUSCULAR; INTRAVENOUS; SUBCUTANEOUS at 21:39

## 2020-05-29 RX ADMIN — ATORVASTATIN CALCIUM SCH MG: 20 TABLET, FILM COATED ORAL at 20:32

## 2020-05-29 RX ADMIN — FUROSEMIDE SCH MG: 40 TABLET ORAL at 16:17

## 2020-05-29 RX ADMIN — METOPROLOL TARTRATE SCH MG: 50 TABLET, FILM COATED ORAL at 08:58

## 2020-05-29 RX ADMIN — METRONIDAZOLE SCH MG: 500 TABLET ORAL at 08:55

## 2020-05-30 VITALS — DIASTOLIC BLOOD PRESSURE: 83 MMHG | SYSTOLIC BLOOD PRESSURE: 133 MMHG

## 2020-05-30 LAB
BUN SERPL-MCNC: 3 MG/DL (ref 7–18)
CALCIUM SERPL-MCNC: 7.5 MG/DL (ref 8.8–10.2)
CHLORIDE SERPL-SCNC: 104 MEQ/L (ref 98–107)
CO2 SERPL-SCNC: 27 MEQ/L (ref 21–32)
CREAT SERPL-MCNC: 0.28 MG/DL (ref 0.55–1.3)
GFR SERPL CREATININE-BSD FRML MDRD: > 60 ML/MIN/{1.73_M2} (ref 39–?)
GLUCOSE SERPL-MCNC: 67 MG/DL (ref 70–100)
HCT VFR BLD AUTO: 25.8 % (ref 36–47)
HGB BLD-MCNC: 8.4 G/DL (ref 12–15.5)
MAGNESIUM SERPL-MCNC: 1.5 MG/DL (ref 1.8–2.4)
MCH RBC QN AUTO: 27.4 PG (ref 27–33)
MCHC RBC AUTO-ENTMCNC: 32.6 G/DL (ref 32–36.5)
MCV RBC AUTO: 84 FL (ref 80–96)
PLATELET # BLD AUTO: 306 10^3/UL (ref 150–450)
POTASSIUM SERPL-SCNC: 3.8 MEQ/L (ref 3.5–5.1)
RBC # BLD AUTO: 3.07 10^6/UL (ref 4–5.4)
SODIUM SERPL-SCNC: 137 MEQ/L (ref 136–145)
WBC # BLD AUTO: 10.8 10^3/UL (ref 4–10)

## 2020-05-30 RX ADMIN — OMEPRAZOLE SCH MG: 20 CAPSULE, DELAYED RELEASE ORAL at 20:35

## 2020-05-30 RX ADMIN — Medication SCH UNITS: at 15:43

## 2020-05-30 RX ADMIN — METRONIDAZOLE SCH MG: 500 TABLET ORAL at 09:56

## 2020-05-30 RX ADMIN — FUROSEMIDE SCH MG: 40 TABLET ORAL at 09:56

## 2020-05-30 RX ADMIN — METOPROLOL TARTRATE SCH MG: 50 TABLET, FILM COATED ORAL at 20:35

## 2020-05-30 RX ADMIN — METRONIDAZOLE SCH MG: 500 TABLET ORAL at 20:35

## 2020-05-30 RX ADMIN — APIXABAN SCH MG: 5 TABLET, FILM COATED ORAL at 09:56

## 2020-05-30 RX ADMIN — SERTRALINE HYDROCHLORIDE SCH MG: 50 TABLET ORAL at 09:56

## 2020-05-30 RX ADMIN — Medication SCH MG: at 20:35

## 2020-05-30 RX ADMIN — Medication PRN UNITS: at 21:29

## 2020-05-30 RX ADMIN — ATORVASTATIN CALCIUM SCH MG: 20 TABLET, FILM COATED ORAL at 20:34

## 2020-05-30 RX ADMIN — CEFTRIAXONE SCH MLS/HR: 2 INJECTION, POWDER, FOR SOLUTION INTRAMUSCULAR; INTRAVENOUS at 20:34

## 2020-05-30 RX ADMIN — Medication SCH MG: at 09:55

## 2020-05-30 RX ADMIN — SODIUM CHLORIDE SCH ML: 9 INJECTION, SOLUTION INTRAMUSCULAR; INTRAVENOUS; SUBCUTANEOUS at 05:47

## 2020-05-30 RX ADMIN — SODIUM CHLORIDE SCH ML: 9 INJECTION, SOLUTION INTRAMUSCULAR; INTRAVENOUS; SUBCUTANEOUS at 15:44

## 2020-05-30 RX ADMIN — OMEPRAZOLE SCH MG: 20 CAPSULE, DELAYED RELEASE ORAL at 09:56

## 2020-05-30 RX ADMIN — APIXABAN SCH MG: 5 TABLET, FILM COATED ORAL at 20:35

## 2020-05-30 RX ADMIN — Medication SCH UNITS: at 05:47

## 2020-05-30 RX ADMIN — LEVOTHYROXINE SODIUM SCH MCG: 50 TABLET ORAL at 05:47

## 2020-05-30 RX ADMIN — Medication SCH MG: at 09:56

## 2020-05-30 RX ADMIN — Medication SCH MG: at 20:34

## 2020-05-30 RX ADMIN — SODIUM CHLORIDE PRN ML: 9 INJECTION, SOLUTION INTRAMUSCULAR; INTRAVENOUS; SUBCUTANEOUS at 21:29

## 2020-05-30 RX ADMIN — POTASSIUM CHLORIDE SCH MEQ: 750 TABLET, EXTENDED RELEASE ORAL at 09:55

## 2020-05-30 RX ADMIN — METOPROLOL TARTRATE SCH MG: 50 TABLET, FILM COATED ORAL at 09:56

## 2020-05-30 RX ADMIN — METRONIDAZOLE SCH MG: 500 TABLET ORAL at 15:43

## 2020-05-30 NOTE — IPN
DATE:  05/29/2020

 

Mrs. Cobb has no complaints today.  She had no nausea, vomiting, or diarrhea.  No

abdominal pain.  She is waiting for her COVID testing to be able to go back to

the nursing home with intravenous (IV) Rocephin and oral Flagyl.  She still has a

drain with some purulent discharge but denies any complaints.

 

LABORATORY DATA:

White count 10.6, hemoglobin 8.4, hematocrit 25.4, platelets 325.

 

Sodium 134, potassium 3.6, chloride 104, bicarbonate 26, BUN 5, creatinine 0.29,

glucose 77, calcium 7.4, magnesium 1.5.  CRP 1.63, down from 8.54.

 

Abdominal abscess culture had bacteroides, two strains, Escherichia (E) coli, and

Streptococcus mitis, and blood cultures had E. coli in two sets.

 

CT abdomen and pelvis was last done on May 25.  The reading on it is not

available still.

 

IMPRESSION:

1.  Intra-abdominal abscess, felt to be related to diverticulitis or possible

other source.  Status post drainage procedure.  Since this has been a recurrent

problem, I recommended IV antibiotics with Rocephin and oral Flagyl.  Would

suggest at least continuing for 2 weeks and repeating CT abdomen and pelvis to

make sure this was fully drained..

 

2.  Iron deficiency anemia   Other possible source of infection could be a

gastric malignancy, and therefore colonoscopy will be scheduled in a couple weeks

with Dr. Trinidad.

 

PLAN:  Monitor complete blood count (CBC) and C-reactive protein (CRP) weekly at

the nursing home.  Keep the drainage in place until there is less than 10 mL and

the cavity has resolved.  Yesterday there was 20 mL of purulent drainage.

## 2020-05-31 VITALS — DIASTOLIC BLOOD PRESSURE: 87 MMHG | SYSTOLIC BLOOD PRESSURE: 119 MMHG

## 2020-05-31 LAB
BASOPHILS # BLD AUTO: 0 10^3/UL (ref 0–0.2)
BASOPHILS NFR BLD AUTO: 0.1 % (ref 0–1)
BUN SERPL-MCNC: 4 MG/DL (ref 7–18)
CALCIUM SERPL-MCNC: 7.6 MG/DL (ref 8.8–10.2)
CHLORIDE SERPL-SCNC: 100 MEQ/L (ref 98–107)
CO2 SERPL-SCNC: 27 MEQ/L (ref 21–32)
CREAT SERPL-MCNC: 0.29 MG/DL (ref 0.55–1.3)
EOSINOPHIL # BLD AUTO: 0 10^3/UL (ref 0–0.5)
EOSINOPHIL NFR BLD AUTO: 0 % (ref 0–3)
GFR SERPL CREATININE-BSD FRML MDRD: > 60 ML/MIN/{1.73_M2} (ref 39–?)
GLUCOSE SERPL-MCNC: 64 MG/DL (ref 70–100)
HCT VFR BLD AUTO: 27.1 % (ref 36–47)
HCT VFR BLD AUTO: 29.4 % (ref 36–47)
HGB BLD-MCNC: 9 G/DL (ref 12–15.5)
HGB BLD-MCNC: 9.9 G/DL (ref 12–15.5)
LYMPHOCYTES # BLD AUTO: 1.2 10^3/UL (ref 1.5–5)
LYMPHOCYTES NFR BLD AUTO: 7.6 % (ref 24–44)
MAGNESIUM SERPL-MCNC: 1.3 MG/DL (ref 1.8–2.4)
MCH RBC QN AUTO: 27.6 PG (ref 27–33)
MCH RBC QN AUTO: 28 PG (ref 27–33)
MCHC RBC AUTO-ENTMCNC: 33.2 G/DL (ref 32–36.5)
MCHC RBC AUTO-ENTMCNC: 33.7 G/DL (ref 32–36.5)
MCV RBC AUTO: 83.1 FL (ref 80–96)
MCV RBC AUTO: 83.1 FL (ref 80–96)
MONOCYTES # BLD AUTO: 1.1 10^3/UL (ref 0–0.8)
MONOCYTES NFR BLD AUTO: 6.7 % (ref 0–5)
NEUTROPHILS # BLD AUTO: 13.7 10^3/UL (ref 1.5–8.5)
NEUTROPHILS NFR BLD AUTO: 85.1 % (ref 36–66)
PLATELET # BLD AUTO: 354 10^3/UL (ref 150–450)
PLATELET # BLD AUTO: 374 10^3/UL (ref 150–450)
POTASSIUM SERPL-SCNC: 3.7 MEQ/L (ref 3.5–5.1)
RBC # BLD AUTO: 3.26 10^6/UL (ref 4–5.4)
RBC # BLD AUTO: 3.54 10^6/UL (ref 4–5.4)
SODIUM SERPL-SCNC: 135 MEQ/L (ref 136–145)
WBC # BLD AUTO: 16.1 10^3/UL (ref 4–10)
WBC # BLD AUTO: 19.1 10^3/UL (ref 4–10)

## 2020-05-31 RX ADMIN — POTASSIUM CHLORIDE SCH MEQ: 750 TABLET, EXTENDED RELEASE ORAL at 09:05

## 2020-05-31 RX ADMIN — SERTRALINE HYDROCHLORIDE SCH MG: 50 TABLET ORAL at 09:05

## 2020-05-31 RX ADMIN — LEVOTHYROXINE SODIUM SCH MCG: 50 TABLET ORAL at 05:30

## 2020-05-31 RX ADMIN — Medication PRN UNITS: at 21:10

## 2020-05-31 RX ADMIN — Medication SCH MG: at 09:09

## 2020-05-31 RX ADMIN — APIXABAN SCH MG: 5 TABLET, FILM COATED ORAL at 09:05

## 2020-05-31 RX ADMIN — METOPROLOL TARTRATE SCH MG: 50 TABLET, FILM COATED ORAL at 09:38

## 2020-05-31 RX ADMIN — Medication SCH UNITS: at 16:59

## 2020-05-31 RX ADMIN — OMEPRAZOLE SCH MG: 20 CAPSULE, DELAYED RELEASE ORAL at 20:21

## 2020-05-31 RX ADMIN — CEFTRIAXONE SCH MLS/HR: 2 INJECTION, POWDER, FOR SOLUTION INTRAMUSCULAR; INTRAVENOUS at 20:21

## 2020-05-31 RX ADMIN — SODIUM CHLORIDE PRN ML: 9 INJECTION, SOLUTION INTRAMUSCULAR; INTRAVENOUS; SUBCUTANEOUS at 21:10

## 2020-05-31 RX ADMIN — METRONIDAZOLE SCH MG: 500 TABLET ORAL at 09:05

## 2020-05-31 RX ADMIN — APIXABAN SCH MG: 5 TABLET, FILM COATED ORAL at 20:21

## 2020-05-31 RX ADMIN — FUROSEMIDE SCH MG: 40 TABLET ORAL at 09:05

## 2020-05-31 RX ADMIN — Medication SCH MG: at 20:20

## 2020-05-31 RX ADMIN — METRONIDAZOLE SCH MG: 500 TABLET ORAL at 20:20

## 2020-05-31 RX ADMIN — SODIUM CHLORIDE SCH ML: 9 INJECTION, SOLUTION INTRAMUSCULAR; INTRAVENOUS; SUBCUTANEOUS at 05:30

## 2020-05-31 RX ADMIN — Medication SCH MG: at 09:05

## 2020-05-31 RX ADMIN — METRONIDAZOLE SCH MG: 500 TABLET ORAL at 16:59

## 2020-05-31 RX ADMIN — Medication SCH UNITS: at 05:30

## 2020-05-31 RX ADMIN — ATORVASTATIN CALCIUM SCH MG: 20 TABLET, FILM COATED ORAL at 20:20

## 2020-05-31 RX ADMIN — ACETAMINOPHEN PRN MG: 325 TABLET ORAL at 09:36

## 2020-05-31 RX ADMIN — OMEPRAZOLE SCH MG: 20 CAPSULE, DELAYED RELEASE ORAL at 09:05

## 2020-05-31 RX ADMIN — METOPROLOL TARTRATE SCH MG: 50 TABLET, FILM COATED ORAL at 21:00

## 2020-05-31 RX ADMIN — SODIUM CHLORIDE SCH ML: 9 INJECTION, SOLUTION INTRAMUSCULAR; INTRAVENOUS; SUBCUTANEOUS at 16:59

## 2020-06-01 VITALS — SYSTOLIC BLOOD PRESSURE: 97 MMHG | DIASTOLIC BLOOD PRESSURE: 63 MMHG

## 2020-06-01 VITALS — DIASTOLIC BLOOD PRESSURE: 76 MMHG | SYSTOLIC BLOOD PRESSURE: 141 MMHG

## 2020-06-01 LAB
BUN SERPL-MCNC: 6 MG/DL (ref 7–18)
CALCIUM SERPL-MCNC: 7.6 MG/DL (ref 8.8–10.2)
CHLORIDE SERPL-SCNC: 100 MEQ/L (ref 98–107)
CO2 SERPL-SCNC: 27 MEQ/L (ref 21–32)
CREAT SERPL-MCNC: 0.37 MG/DL (ref 0.55–1.3)
GFR SERPL CREATININE-BSD FRML MDRD: > 60 ML/MIN/{1.73_M2} (ref 39–?)
GLUCOSE SERPL-MCNC: 65 MG/DL (ref 70–100)
HCT VFR BLD AUTO: 29.7 % (ref 36–47)
HGB BLD-MCNC: 9.9 G/DL (ref 12–15.5)
MAGNESIUM SERPL-MCNC: 1.5 MG/DL (ref 1.8–2.4)
MCH RBC QN AUTO: 27.7 PG (ref 27–33)
MCHC RBC AUTO-ENTMCNC: 33.3 G/DL (ref 32–36.5)
MCV RBC AUTO: 83.2 FL (ref 80–96)
PLATELET # BLD AUTO: 394 10^3/UL (ref 150–450)
POTASSIUM SERPL-SCNC: 4.2 MEQ/L (ref 3.5–5.1)
RBC # BLD AUTO: 3.57 10^6/UL (ref 4–5.4)
SODIUM SERPL-SCNC: 135 MEQ/L (ref 136–145)
WBC # BLD AUTO: 18 10^3/UL (ref 4–10)

## 2020-06-01 RX ADMIN — METOPROLOL TARTRATE SCH MG: 50 TABLET, FILM COATED ORAL at 21:00

## 2020-06-01 RX ADMIN — FUROSEMIDE SCH MG: 40 TABLET ORAL at 10:30

## 2020-06-01 RX ADMIN — SODIUM CHLORIDE PRN ML: 9 INJECTION, SOLUTION INTRAMUSCULAR; INTRAVENOUS; SUBCUTANEOUS at 22:47

## 2020-06-01 RX ADMIN — Medication SCH UNITS: at 16:14

## 2020-06-01 RX ADMIN — METRONIDAZOLE SCH MG: 500 TABLET ORAL at 16:14

## 2020-06-01 RX ADMIN — Medication SCH MG: at 21:30

## 2020-06-01 RX ADMIN — OMEPRAZOLE SCH MG: 20 CAPSULE, DELAYED RELEASE ORAL at 21:30

## 2020-06-01 RX ADMIN — LEVOTHYROXINE SODIUM SCH MCG: 50 TABLET ORAL at 05:38

## 2020-06-01 RX ADMIN — SERTRALINE HYDROCHLORIDE SCH MG: 50 TABLET ORAL at 10:30

## 2020-06-01 RX ADMIN — SODIUM CHLORIDE SCH ML: 9 INJECTION, SOLUTION INTRAMUSCULAR; INTRAVENOUS; SUBCUTANEOUS at 05:38

## 2020-06-01 RX ADMIN — CEFTRIAXONE SCH MLS/HR: 2 INJECTION, POWDER, FOR SOLUTION INTRAMUSCULAR; INTRAVENOUS at 21:31

## 2020-06-01 RX ADMIN — Medication SCH UNITS: at 05:38

## 2020-06-01 RX ADMIN — POTASSIUM CHLORIDE SCH MEQ: 750 TABLET, EXTENDED RELEASE ORAL at 10:29

## 2020-06-01 RX ADMIN — METOPROLOL TARTRATE SCH MG: 50 TABLET, FILM COATED ORAL at 09:00

## 2020-06-01 RX ADMIN — OMEPRAZOLE SCH MG: 20 CAPSULE, DELAYED RELEASE ORAL at 10:31

## 2020-06-01 RX ADMIN — METRONIDAZOLE SCH MG: 500 TABLET ORAL at 10:29

## 2020-06-01 RX ADMIN — SODIUM CHLORIDE SCH ML: 9 INJECTION, SOLUTION INTRAMUSCULAR; INTRAVENOUS; SUBCUTANEOUS at 16:15

## 2020-06-01 RX ADMIN — METRONIDAZOLE SCH MG: 500 TABLET ORAL at 21:30

## 2020-06-01 RX ADMIN — Medication PRN UNITS: at 22:46

## 2020-06-01 RX ADMIN — ATORVASTATIN CALCIUM SCH MG: 20 TABLET, FILM COATED ORAL at 21:30

## 2020-06-01 RX ADMIN — APIXABAN SCH MG: 5 TABLET, FILM COATED ORAL at 10:30

## 2020-06-01 RX ADMIN — Medication SCH MG: at 10:28

## 2020-06-01 RX ADMIN — Medication SCH MG: at 10:29

## 2020-06-01 RX ADMIN — APIXABAN SCH MG: 5 TABLET, FILM COATED ORAL at 21:30

## 2020-06-01 NOTE — DS.PDOC
Discharge Summary


General


Date of Admission


May 20, 2020 at 13:50


Date of Discharge


6/1/20





Discharge Summary


PROCEDURES PERFORMED DURING STAY: [None].





ADMITTING DIAGNOSES: 


Sepsis


Intra-abdominal abscess


Hypokalemia


Atrial fibrillation with RVR


Right kidney hydronephrosis


Normocytic anemia


Electrolyte imbalance


DISCHARGE DIAGNOSES:


Sepsis


Intra-abdominal abscess


Hypokalemia


Atrial fibrillation with RVR


Right kidney hydronephrosis


Normocytic anemia


Electrolyte imbalance





COMPLICATIONS/CHIEF COMPLAINT: Sepsis.





HISTORY OF PRESENT ILLNESS: Patient's 80 years old female with past medical 

history of diverticulitis abscess perforation, dementia, hypothyroidism, atrial 

fibrillation, CVA presented to the hospital after she was found unconsciousness 

in the nursing home in atrial fibrillation with rapid ventricular rate. On 

arrival patient was in rigors with rapid ventricular rate up to 190. Of note pat

ient was recently discharged from the hospital after she was found to have 

perforated viscus secondary to perforated diverticular due to diverticulitis, 

status post IR drainage. Patient was discharged from the hospital with course of

 antibiotics


In emergency room patient was found to have hypotension of 80/40, leukocytosis 

of 16.9, hemoglobin of 11.3, TSH 14.1, K 2.9, lactic acid 4.6. CT of abdomen 

showed significant right kidney hydronephrosis.








HOSPITAL COURSE: During hospital stay following issue addressed


1) Sepsis


Resolved


Patient developed severe sepsis with hypotension, leukocytosis and lactic 

acidosis on admission


Most likely secondary to abdominal abscess


On 5/21/20 blood pressure improved after IV fluid


DC Cefepime IV, vancomycin IV DC


Ceftriaxone IV started on 5/23/20, Flagyl started on 5/26/20 to cover 

Bacterioids


blood culture positive for gram-negative Escherichia coli, abscess culture 

positive for Escherichia coli and Streptococcus mitis


Repeat blood culture negative





(2) Intra-abdominal abscess


CT showed phlegmonous change in the right pelvis due to prior perforated sigmoid


diverticulitis with what appears to be multiloculated air/fluid in the right 

pelvis


Dr. Carey talked to patient about possible surgery, patient declined surgical 

intervention


abscess by IR US abscess was drained on 5/21/20, continuous drainage placed


abscess culture positive for Escherichia coli


on 5/25/2020 CT scan was done, result pending


CEA wnl, there is concern for colon cancer, given positive stool for occult 

blood. Patient could have perforation secondary malignancies.


Colonoscopy in the outpatient settings 


Infectious diseases specialist Dr. Francois recommended IV antibiotics with Rocephin

 and oral Flagyl.  Would


suggest at least continuing for 2 weeks and repeating CT abdomen and pelvis to


make sure this was fully drained.


(3) Hypokalemia


replaced





(4) Atrial fibrillation with RVR


Most likely secondary to sepsis is 


Lopressor IV when necessary


Anticoagulation on hold due to GI bleed


I increased her dose of beta blockers, heart rate is under control





Right kidney hydronephrosis


Most likely secondary to abdominal abscess


Await CT scan results





Normocytic anemia


Multifactorial, superimposed with GI blood loss


Patient has low iron. We will start iron supplementation


B12 and folate wnl





Electrolyte imbalance


Magnesium replaced





DISCHARGE MEDICATIONS: Please see below.


 


ALLERGIES: Please see below.





PHYSICAL EXAMINATION ON DISCHARGE:


VITAL SIGNS: Please see below.


GENERAL: awake, alert, NAD


HEENT: NCAT, anicteric sclera, PERRLA


NECK: supple, no JVD


CARDIOVASCULAR EXAMINATION: Irregularly irregular


RESPIRATORY EXAMINATION: CTA b/l, no wheezes/rales/rhonchi


ABDOMINAL EXAMINATION: positive bowel sounds x 4, NT


EXTREMITIES: no cyanosis, clubbing, edema


SKIN: warm, no rashes.


NEUROLOGICAL EXAMINATION: AAO x 3, no motor/sensory deficits


PSYCHIATRIC EXAMINATION: calm, normal affect











LABORATORY DATA: Please see below.





IMAGING: 


REASON: Followup.


 


COMPARISON:  05/25/2020.


 


CONTRAST: 100 mL Isovue 370.


 


There is a small right pleural effusion and subtle right basilar patchy 

opacities


status quo.


 


The pelvic drainage tube is unchanged in position.  A small air fluid level is


now seen in the region of the tip of the drainage tube representing a change 

from


the prior exam.  This measures approximately 4 x 2.4 x 3.2 cm.  No free air has


developed in the abdomen or pelvis.  There is a trace amount of free pelvic


fluid, status quo.  There is a hiatal hernia status quo.  The liver, 

gallbladder,


spleen, pancreas and adrenal glands, and kidneys are unchanged.  The abdominal


aorta and paraaortic regions are unchanged.  The osseous structure are u

nchanged.


 


 


IMPRESSION:


 


Small right hemipelvic air fluid level as described above.  Possible abscess


recurrence. This needs to be correlated clinically with appropriate followup.


 


Other findings as described above.


 


 


Electronically Signed by


Won Briceno DO 06/01/2020 12:36 P


  


DD:  Won Briceno MD, DO 06/01/20 1115


DT:  FRANCES  06/01/20 1231  


DS:  PHIDE 06/01/20 1236 


PROGNOSIS: 





ACTIVITY: [As tolerated].





DIET: Cardiac





DISCHARGE PLAN: Continue IV antibiotics for 2 weeks





DISPOSITION: SNF











ITEMS TO FOLLOWUP ON ON OUTPATIENT:


Follow-up with Dr. Trinidad, Dr. Francois and PCP





DISCHARGE CONDITION: [Stable].





TIME SPENT ON DISCHARGE: Greater than 20 minutes.





Vital Signs/I&Os





Vital Signs








  Date Time  Temp Pulse Resp B/P (MAP) Pulse Ox O2 Delivery O2 Flow Rate FiO2


 


6/1/20 10:31  92  97/63 (74)    


 


6/1/20 06:00 97.9  18  95 Room Air  














I&O- Last 24 Hours up to 6 AM 


 


 6/1/20





 06:00


 


Intake Total 820 ml


 


Output Total 0 ml


 


Balance 820 ml











Laboratory Data


Labs 24H


Laboratory Tests 2


6/1/20 05:17: 


Nucleated Red Blood Cells % (auto) 0.0, Anion Gap 8, Glomerular Filtration Rate 

> 60.0, Calcium Level 7.6L, Magnesium Level 1.5L


CBC/BMP


Laboratory Tests


6/1/20 05:17











Microbiology





Microbiology


5/31/20 Blood Culture - Preliminary, Resulted


          No growth after 24 hours . All specim...


5/28/20 Coronavirus COVID-19 PCR ([facility]) - Final, Complete





Discharge Medications


Scheduled


Apixaban (Eliquis) 5 Mg Tablet, 5 MG PO BID, (Reported)


Atenolol (Atenolol) 25 Mg Tablet, 25 MG PO DAILY, (Reported)


Atorvastatin Calcium (Atorvastatin Calcium) 40 Mg Tab, 40 MG PO QHS, (Reported)


Donepezil HCl (Donepezil HCl) 10 Mg Tablet, 10 MG PO QHS, (Reported)


Furosemide (Lasix) 40 Mg Tablet, 40 MG PO DAILY, (Reported)


Levothyroxine Sodium (Levothyroxine Sodium) 50 Mcg Tab, 50 MCG PO DAILY, 

(Reported)


Metronidazole (Flagyl) 500 Mg Tablet, 500 MG PO TID


Multivitamins (Thera M Plus Tablet) 1 Tab Tab, 1 TAB PO DAILY, (Reported)


Omeprazole (Omeprazole) 20 Mg Cap, 20 MG PO BID, (Reported)


Sertraline HCl (Sertraline HCl) 50 Mg Tablet, 50 MG PO DAILY, (Reported)





Scheduled PRN


Acetaminophen (Tylenol) 325 Mg Tablet, 650 MG PO QID PRN for PAIN, (Reported)





Allergies


Coded Allergies:  


     No Known Allergies (Unverified , 5/5/20)











LACY RAYMOND DO             Jun 1, 2020 17:02

## 2020-06-01 NOTE — REP
Alexandria Gorman Patient Age: 89 year old  MESSAGE:   Jan 1st patient fell backwards and she states that the fall caused her to have a slight bowel movement. After that on Jan 6th patient started having vomiting and diarrhea spells and the again on Jan 11th she had another vomiting and diarrhea spell. The on the 14th she was vomiting at night and then on the 15th she had diarrhea. Patient states she has also been experiencing loose stool and gas spells so she tool imodium. Patient states she had some blood work taken out that a nurse practitioner ordered and she also dropped off a stool sample off yesterday and she wants [facility] to look at those results. Please advise.      Next and Last Visit with Provider and Department  Last visit with this provider: Visit date not found  Last visit with this department: Visit date not found    Next visit with this provider: Visit date not found  Next visit with this department: Visit date not found    WEIGHT AND HEIGHT:   Wt Readings from Last 1 Encounters:   01/22/19 70.8 kg (156 lb)     Ht Readings from Last 1 Encounters:   01/22/19 5' 3\" (1.6 m)     BMI Readings from Last 1 Encounters:   01/22/19 27.63 kg/m²       ALLERGIES:  Pantoprazole; Azithromycin; Diclofenac sodium; Gabapentin; Methadone hcl; Pregabalin; Simvastatin; Atenolol; Fentanyl tdsy; Goodys extra strength; Omeprazole; and Propoxyphene  Current Outpatient Medications   Medication   • [START ON 3/23/2019] oxyCODONE, IMM REL, (ROXICODONE) 5 MG immediate release tablet   • [START ON 2/21/2019] oxyCODONE, IMM REL, (ROXICODONE) 5 MG immediate release tablet   • oxyCODONE, IMM REL, (ROXICODONE) 5 MG immediate release tablet   • insulin glargine (LANTUS SOLOSTAR) 100 UNIT/ML pen-injector   • acetaminophen (TYLENOL) 650 MG CR tablet   • ALPRAZolam (XANAX) 0.25 MG tablet   • calcitRIOL (ROCALTROL) 0.25 MCG capsule   • cilostazol (PLETAL) 100 MG tablet   • famotidine (PEPCID) 20 MG tablet   • furosemide (LASIX) 40 MG tablet  REASON: Followup.

 

COMPARISON:  05/25/2020.

 

CONTRAST: 100 mL Isovue 370.

 

There is a small right pleural effusion and subtle right basilar patchy opacities

status quo.

 

The pelvic drainage tube is unchanged in position.  A small air fluid level is

now seen in the region of the tip of the drainage tube representing a change from

the prior exam.  This measures approximately 4 x 2.4 x 3.2 cm.  No free air has

developed in the abdomen or pelvis.  There is a trace amount of free pelvic

fluid, status quo.  There is a hiatal hernia status quo.  The liver, gallbladder,

spleen, pancreas and adrenal glands, and kidneys are unchanged.  The abdominal

aorta and paraaortic regions are unchanged.  The osseous structure are unchanged.

 

 

IMPRESSION:

 

Small right hemipelvic air fluid level as described above.  Possible abscess

recurrence. This needs to be correlated clinically with appropriate followup.

 

Other findings as described above.

 

 

Electronically Signed by

Won Briceno DO 06/01/2020 12:36 P   • Glucose Blood (BLOOD GLUCOSE TEST STRIPS) Strip   • Insulin Pen Needle (BD PEN NEEDLE ISAURA U/F) 32G X 4 MM Misc   • Insulin Syringe-Needle U-100 (BD VEO INSULIN SYR ULTRAFINE) 31G X 15/64\" 0.3 ML Misc   • Insulin Syringe 30G X 5/16\" 0.3 ML Misc   • metoPROLOL tartrate (LOPRESSOR) 50 MG tablet   • SOFTCLIX LANCETS Misc   • Multiple Vitamin tablet   • polyethylene glycol (MIRALAX) powder   • pravastatin (PRAVACHOL) 80 MG tablet   • sitaGLIPtin (JANUVIA) 100 MG tablet   • warfarin (COUMADIN) 5 MG tablet   • nortriptyline (PAMELOR) 10 MG capsule     No current facility-administered medications for this visit.      PHARMACY to use:           Pharmacy preference(s) on file:   [facility] - [place], [place] - [address]  [address] [address]  Phone: [phone] Fax: [phone]    [id number] - [place], [place] - [address] [facility]  [address] [address]  Phone: [phone] Fax: [phone]      CALL BACK INFO: Ok to leave response (including medical information) on answering machine  ROUTING: Patient's physician/staff        PCP: Ulises Ralph MD         INS: Payor: MEDICARE / Plan: PARTA AND B / Product Type: MEDICARE   PATIENT ADDRESS:  [address] [place], [place] [id number]

## 2020-06-02 VITALS — DIASTOLIC BLOOD PRESSURE: 58 MMHG | SYSTOLIC BLOOD PRESSURE: 102 MMHG

## 2020-06-02 VITALS — SYSTOLIC BLOOD PRESSURE: 119 MMHG | DIASTOLIC BLOOD PRESSURE: 65 MMHG

## 2020-06-02 VITALS — SYSTOLIC BLOOD PRESSURE: 90 MMHG | DIASTOLIC BLOOD PRESSURE: 52 MMHG

## 2020-06-02 VITALS — SYSTOLIC BLOOD PRESSURE: 104 MMHG | DIASTOLIC BLOOD PRESSURE: 64 MMHG

## 2020-06-02 VITALS — SYSTOLIC BLOOD PRESSURE: 111 MMHG | DIASTOLIC BLOOD PRESSURE: 65 MMHG

## 2020-06-02 VITALS — DIASTOLIC BLOOD PRESSURE: 68 MMHG | SYSTOLIC BLOOD PRESSURE: 102 MMHG

## 2020-06-02 VITALS — SYSTOLIC BLOOD PRESSURE: 105 MMHG | DIASTOLIC BLOOD PRESSURE: 67 MMHG

## 2020-06-02 VITALS — DIASTOLIC BLOOD PRESSURE: 60 MMHG | SYSTOLIC BLOOD PRESSURE: 104 MMHG

## 2020-06-02 VITALS — SYSTOLIC BLOOD PRESSURE: 84 MMHG | DIASTOLIC BLOOD PRESSURE: 60 MMHG

## 2020-06-02 LAB
ALBUMIN SERPL BCG-MCNC: 1.5 GM/DL (ref 3.2–5.2)
ALT SERPL W P-5'-P-CCNC: 12 U/L (ref 12–78)
BILIRUB SERPL-MCNC: 0.2 MG/DL (ref 0.2–1)
BUN SERPL-MCNC: 7 MG/DL (ref 7–18)
CALCIUM SERPL-MCNC: 7.3 MG/DL (ref 8.8–10.2)
CHLORIDE SERPL-SCNC: 103 MEQ/L (ref 98–107)
CO2 SERPL-SCNC: 28 MEQ/L (ref 21–32)
CREAT SERPL-MCNC: 0.33 MG/DL (ref 0.55–1.3)
CRP SERPL-MCNC: 1.96 MG/DL (ref 0–0.3)
GFR SERPL CREATININE-BSD FRML MDRD: > 60 ML/MIN/{1.73_M2} (ref 39–?)
GLUCOSE SERPL-MCNC: 103 MG/DL (ref 70–100)
HCT VFR BLD AUTO: 25.2 % (ref 36–47)
HGB BLD-MCNC: 8.4 G/DL (ref 12–15.5)
MAGNESIUM SERPL-MCNC: 1.5 MG/DL (ref 1.8–2.4)
MCH RBC QN AUTO: 27.8 PG (ref 27–33)
MCHC RBC AUTO-ENTMCNC: 33.3 G/DL (ref 32–36.5)
MCV RBC AUTO: 83.4 FL (ref 80–96)
PLATELET # BLD AUTO: 355 10^3/UL (ref 150–450)
POTASSIUM SERPL-SCNC: 4 MEQ/L (ref 3.5–5.1)
PROT SERPL-MCNC: 4.8 GM/DL (ref 6.4–8.2)
RBC # BLD AUTO: 3.02 10^6/UL (ref 4–5.4)
SODIUM SERPL-SCNC: 137 MEQ/L (ref 136–145)
WBC # BLD AUTO: 10.1 10^3/UL (ref 4–10)

## 2020-06-02 RX ADMIN — METRONIDAZOLE SCH MG: 500 TABLET ORAL at 15:50

## 2020-06-02 RX ADMIN — METOPROLOL TARTRATE SCH MG: 25 TABLET, FILM COATED ORAL at 18:33

## 2020-06-02 RX ADMIN — Medication PRN UNITS: at 15:56

## 2020-06-02 RX ADMIN — METOPROLOL TARTRATE ONE MG: 25 TABLET, FILM COATED ORAL at 15:53

## 2020-06-02 RX ADMIN — SODIUM CHLORIDE SCH ML: 9 INJECTION, SOLUTION INTRAMUSCULAR; INTRAVENOUS; SUBCUTANEOUS at 18:33

## 2020-06-02 RX ADMIN — ATORVASTATIN CALCIUM SCH MG: 20 TABLET, FILM COATED ORAL at 20:15

## 2020-06-02 RX ADMIN — DIGOXIN SCH MG: 0.25 INJECTION INTRAMUSCULAR; INTRAVENOUS at 18:32

## 2020-06-02 RX ADMIN — METRONIDAZOLE SCH MG: 500 TABLET ORAL at 09:13

## 2020-06-02 RX ADMIN — OMEPRAZOLE SCH MG: 20 CAPSULE, DELAYED RELEASE ORAL at 09:13

## 2020-06-02 RX ADMIN — POTASSIUM CHLORIDE SCH MEQ: 750 TABLET, EXTENDED RELEASE ORAL at 09:13

## 2020-06-02 RX ADMIN — SODIUM CHLORIDE SCH ML: 9 INJECTION, SOLUTION INTRAMUSCULAR; INTRAVENOUS; SUBCUTANEOUS at 05:51

## 2020-06-02 RX ADMIN — Medication SCH MG: at 09:12

## 2020-06-02 RX ADMIN — SODIUM CHLORIDE PRN ML: 9 INJECTION, SOLUTION INTRAMUSCULAR; INTRAVENOUS; SUBCUTANEOUS at 15:56

## 2020-06-02 RX ADMIN — METRONIDAZOLE SCH MG: 500 TABLET ORAL at 20:15

## 2020-06-02 RX ADMIN — OMEPRAZOLE SCH MG: 20 CAPSULE, DELAYED RELEASE ORAL at 20:15

## 2020-06-02 RX ADMIN — APIXABAN SCH MG: 5 TABLET, FILM COATED ORAL at 09:13

## 2020-06-02 RX ADMIN — Medication SCH MG: at 09:13

## 2020-06-02 RX ADMIN — LEVOTHYROXINE SODIUM SCH MCG: 50 TABLET ORAL at 05:50

## 2020-06-02 RX ADMIN — MAGNESIUM SULFATE IN DEXTROSE SCH MLS/HR: 10 INJECTION, SOLUTION INTRAVENOUS at 09:12

## 2020-06-02 RX ADMIN — MAGNESIUM SULFATE IN DEXTROSE SCH MLS/HR: 10 INJECTION, SOLUTION INTRAVENOUS at 09:58

## 2020-06-02 RX ADMIN — Medication SCH MG: at 20:16

## 2020-06-02 RX ADMIN — METOPROLOL TARTRATE ONE MG: 25 TABLET, FILM COATED ORAL at 15:54

## 2020-06-02 RX ADMIN — SERTRALINE HYDROCHLORIDE SCH MG: 50 TABLET ORAL at 09:13

## 2020-06-02 RX ADMIN — METOPROLOL TARTRATE SCH MG: 50 TABLET, FILM COATED ORAL at 09:00

## 2020-06-02 RX ADMIN — Medication SCH UNITS: at 18:33

## 2020-06-02 RX ADMIN — Medication SCH MG: at 20:18

## 2020-06-02 RX ADMIN — FUROSEMIDE SCH MG: 40 TABLET ORAL at 09:00

## 2020-06-02 RX ADMIN — CEFTRIAXONE SCH MLS/HR: 2 INJECTION, POWDER, FOR SOLUTION INTRAMUSCULAR; INTRAVENOUS at 20:16

## 2020-06-02 RX ADMIN — APIXABAN SCH MG: 5 TABLET, FILM COATED ORAL at 20:15

## 2020-06-02 RX ADMIN — Medication SCH UNITS: at 05:51

## 2020-06-02 NOTE — IPN
DATE:  06/02/2020

 

Mrs. Cobb has no complaints today except for decreased appetite, but she is

tachycardic, in atrial fibrillation and hypotensive and therefore she is being

transferred to the progressive care unit (PCU).  She denies any chest pain,

shortness of breath or palpitations.

 

LABORATORY DATA:  White count is 10.1 down from 18, hemoglobin 8.4, hematocrit

25.2, platelets 355.  Her hemoglobin dropped from 9.9 to 8.4 today.  Sodium 137,

potassium 4, chloride 108, bicarbonate 28, BUN 7, creatinine 0.3, glucose 103,

lactic acid 0.8, calcium 7.3, magnesium 1.5, AST 13, ALT 12 and CRP 1.96.

 

Abscess culture:  Escherichia (E) coli, Streptococcus mitis, bacteroides.  Two

pathogens.  She is currently day #10 of IV ceftriaxone, day #7 of oral Flagyl.

 

On physical exam, pulse 125, respirations 16, blood pressure 89/57, oxygen

saturation (O2 sat) 96% on room air.  Temperature is 98.4.

Heart:  Irregularly irregular, systolic ejection murmur 2/6 at the left upper

sternal border.  Lungs are clear.  No wheezes, rales or rhonchi.

Abdomen:  Soft, nontender.  No hepatosplenomegaly.

Extremities:  +2 pitting edema bilaterally.  No calf tenderness.

Right upper quadrant drain with purulent pus, 20 mL was emptied today.

 

IMPRESSION:

1.  Intra-abdominal abscess, polymicrobial brittany with E-coli Streptococcus mitis

and bacteroides with E-coli bacteremia, on IV Rocephin and oral Flagyl.

Inflammatory markers are trending down.

2.  Atrial fibrillation with rapid ventricular response and hypotension.  Could

have been triggered by anemia, worsening anemia.  Her hemoglobin is down to 8.4

without lactic acidosis.  May benefit from blood transfusion.

3.  Anorexia.  Could be related to medication, especially metronidazole.

Encourage Ensure as the patient has protein-calorie malnutrition and

hypoalbuminemia.

 

PLAN:  Continue same antibiotics.  Followup CT abdomen and pelvis done on

06/01/2020 shows small air fluid level now in the region of the tip of the

drainage tube, represents a change from the prior exam.  This measures 4 x 2.4 x

3.2 cm, concern for abscess recurrence.  Chest x-ray, PA and lateral, stable

chronic changes, cardiomegaly.

 

Continue IV antibiotics.  Reconsult surgery.  I would suggest getting a

colonoscopy to look at the underlying reason for her worsening anemia and

worsening abscess as a gastrointestinal (GI) source.  The patient agrees on

colonoscopy.  I just discussed the case with her once medically stable and atrial

fibrillation under better control.

## 2020-06-02 NOTE — REP
REASON: Cough and fever.

 

COMPARISON:  05/20/2020

 

The technique utilized in obtaining the radiograph has magnified the cardiac

silhouette and accentuated the interstitial markings.

 

There is cardiomegaly accentuated by technique.  There is a hiatal hernia status

quo.  There is persistent elevation of the diaphragmatic surface of the right

lung status quo.  Right lower lobe opacities are suspected unchanged. There are

no new abnormal opacities. There is no change in the osseous structures.

 

IMPRESSION:

 

Stable chronic changes as described above.  Consider PA and lateral views of the

chest.

 

 

Electronically Signed by

Won Briceno DO 06/02/2020 09:45 A

## 2020-06-02 NOTE — IPNPDOC
Text Note


Date of Service


The patient was seen on 6/2/20.





NOTE


Subjective:


Patient is a 80-year-old  female with a PMHx of Diverticulitis s/p 

abscess / perforation, Dementia, Hypothyroidism, A. fib, CVA , who presented to 

the hospital after she was found unconscious at her nursing home. She was found 

to be in A. fib with RVR.





Patient had a recent admission for which she was found to have a perforated 

diverticulum with abscess collection. She had an iron that a drain placed on 

4/29 and cultures have grown Escherichia coli, Klebsiella pneumonia and 

Streptococcus, as well as anaerobic bacteria.





Patient was seen and examined at the bedside. Patient appears comfortable. 

Denies any chest pain, shortness of breath or palpitations. Denies nausea, 

vomiting, abdominal pain, constipation, or urinary discomfort.





Objective:


Vitals (See below)


General: Lying in bed, appears comfortable, AAOx3


HEENT: NC, AT


CVS: +S1S2


Lungs: Fair air entry b/l, no evidence of wheezing / rhonchi / crackles 


Abdomen: Soft, ND, NT, +Drain present


Extremities: LE with 1+ pitting edema, - Calf tenderness





Assessment and plan:


Sepsis - 2/2 intra-abdominal abscess


- Currently, patient appears asymptomatic


- Hemodynamically stable and afebrile


- Leukocytosis has improved. No lactic acidosis


- Blood cultures 5/21, no growth at 5 days; blood cultures 5/31, preliminary 

negative at 48 hours


- Abscess culture 5/21: Escherichia coli and Streptococcus mitis


- CT abdomen / pelvis 6/1: Small right hemipelvic air fluid level as described 

above.  Possible abscess recurrence. This needs to be correlated clinically with

appropriate followup. Other findings as described above.


- c/w Ceftriaxone and Flagyl 


- Infectious disease and general surgery are on consultation; we appreciate 

their input





LE edema


- Will hold furosemide


- Will start Albumin





Diverticulitis s/p abscess / perforation


- See above





Normocytic anemia


- Hg appears stable


- No evidence of bleeding





Hypomagnesemia


- Will c/w Supplementation 





Dementia





Hypothyroidism


- c/w Levothyroxine 





A. fib


- c/w rate control with metoprolol will adjust frequency and dose to ensure rate

control


- c/w full anticoagulation with Eliquis





CVA


- c/w Atorvastatin and Eliquis 





GERD


- c/w Omeprazole 





DVT prophylaxis


- c/w full anticoagulation with Eliquis





VS,Fishbone, I+O


VS, Fishbone, I+O


Laboratory Tests


6/2/20 05:21











Vital Signs








  Date Time  Temp Pulse Resp B/P (MAP) Pulse Ox O2 Delivery O2 Flow Rate FiO2


 


6/2/20 13:52 98.4 125 16 111/65 96 Room Air  














I&O- Last 24 Hours up to 6 AM 


 


 6/2/20





 06:00


 


Intake Total 1750 ml


 


Output Total 1100 ml


 


Balance 650 ml

















ALYSSIA COBURN MD                 Jun 2, 2020 15:13

## 2020-06-03 VITALS — SYSTOLIC BLOOD PRESSURE: 110 MMHG | DIASTOLIC BLOOD PRESSURE: 62 MMHG

## 2020-06-03 VITALS — DIASTOLIC BLOOD PRESSURE: 62 MMHG | SYSTOLIC BLOOD PRESSURE: 100 MMHG

## 2020-06-03 VITALS — DIASTOLIC BLOOD PRESSURE: 72 MMHG | SYSTOLIC BLOOD PRESSURE: 106 MMHG

## 2020-06-03 VITALS — SYSTOLIC BLOOD PRESSURE: 92 MMHG | DIASTOLIC BLOOD PRESSURE: 58 MMHG

## 2020-06-03 VITALS — SYSTOLIC BLOOD PRESSURE: 122 MMHG | DIASTOLIC BLOOD PRESSURE: 70 MMHG

## 2020-06-03 VITALS — DIASTOLIC BLOOD PRESSURE: 70 MMHG | SYSTOLIC BLOOD PRESSURE: 108 MMHG

## 2020-06-03 VITALS — SYSTOLIC BLOOD PRESSURE: 98 MMHG | DIASTOLIC BLOOD PRESSURE: 50 MMHG

## 2020-06-03 VITALS — DIASTOLIC BLOOD PRESSURE: 62 MMHG | SYSTOLIC BLOOD PRESSURE: 110 MMHG

## 2020-06-03 VITALS — SYSTOLIC BLOOD PRESSURE: 110 MMHG | DIASTOLIC BLOOD PRESSURE: 60 MMHG

## 2020-06-03 VITALS — DIASTOLIC BLOOD PRESSURE: 60 MMHG | SYSTOLIC BLOOD PRESSURE: 118 MMHG

## 2020-06-03 VITALS — SYSTOLIC BLOOD PRESSURE: 98 MMHG | DIASTOLIC BLOOD PRESSURE: 60 MMHG

## 2020-06-03 VITALS — SYSTOLIC BLOOD PRESSURE: 102 MMHG | DIASTOLIC BLOOD PRESSURE: 60 MMHG

## 2020-06-03 VITALS — DIASTOLIC BLOOD PRESSURE: 58 MMHG | SYSTOLIC BLOOD PRESSURE: 98 MMHG

## 2020-06-03 VITALS — SYSTOLIC BLOOD PRESSURE: 112 MMHG | DIASTOLIC BLOOD PRESSURE: 82 MMHG

## 2020-06-03 VITALS — DIASTOLIC BLOOD PRESSURE: 60 MMHG | SYSTOLIC BLOOD PRESSURE: 102 MMHG

## 2020-06-03 VITALS — SYSTOLIC BLOOD PRESSURE: 110 MMHG | DIASTOLIC BLOOD PRESSURE: 78 MMHG

## 2020-06-03 LAB
BUN SERPL-MCNC: 4 MG/DL (ref 7–18)
CALCIUM SERPL-MCNC: 7.6 MG/DL (ref 8.8–10.2)
CHLORIDE SERPL-SCNC: 103 MEQ/L (ref 98–107)
CO2 SERPL-SCNC: 29 MEQ/L (ref 21–32)
CREAT SERPL-MCNC: 0.28 MG/DL (ref 0.55–1.3)
CRP SERPL-MCNC: 1.16 MG/DL (ref 0–0.3)
DIGOXIN SERPL-MCNC: 1.4 NG/ML (ref 0.5–2)
GFR SERPL CREATININE-BSD FRML MDRD: > 60 ML/MIN/{1.73_M2} (ref 39–?)
GLUCOSE SERPL-MCNC: 79 MG/DL (ref 70–100)
HCT VFR BLD AUTO: 26.9 % (ref 36–47)
HGB BLD-MCNC: 8.7 G/DL (ref 12–15.5)
MAGNESIUM SERPL-MCNC: 1.7 MG/DL (ref 1.8–2.4)
MCH RBC QN AUTO: 27.2 PG (ref 27–33)
MCHC RBC AUTO-ENTMCNC: 32.3 G/DL (ref 32–36.5)
MCV RBC AUTO: 84.1 FL (ref 80–96)
PLATELET # BLD AUTO: 371 10^3/UL (ref 150–450)
POTASSIUM SERPL-SCNC: 3.9 MEQ/L (ref 3.5–5.1)
RBC # BLD AUTO: 3.2 10^6/UL (ref 4–5.4)
SODIUM SERPL-SCNC: 136 MEQ/L (ref 136–145)
WBC # BLD AUTO: 7.1 10^3/UL (ref 4–10)

## 2020-06-03 RX ADMIN — METOPROLOL TARTRATE SCH MG: 25 TABLET, FILM COATED ORAL at 12:31

## 2020-06-03 RX ADMIN — CEFTRIAXONE SCH MLS/HR: 2 INJECTION, POWDER, FOR SOLUTION INTRAMUSCULAR; INTRAVENOUS at 20:40

## 2020-06-03 RX ADMIN — METRONIDAZOLE SCH MG: 500 TABLET ORAL at 20:39

## 2020-06-03 RX ADMIN — LEVOTHYROXINE SODIUM SCH MCG: 50 TABLET ORAL at 05:26

## 2020-06-03 RX ADMIN — POTASSIUM CHLORIDE SCH MEQ: 750 TABLET, EXTENDED RELEASE ORAL at 08:10

## 2020-06-03 RX ADMIN — APIXABAN SCH MG: 5 TABLET, FILM COATED ORAL at 20:39

## 2020-06-03 RX ADMIN — DIGOXIN SCH MG: 0.25 INJECTION INTRAMUSCULAR; INTRAVENOUS at 00:12

## 2020-06-03 RX ADMIN — OMEPRAZOLE SCH MG: 20 CAPSULE, DELAYED RELEASE ORAL at 20:39

## 2020-06-03 RX ADMIN — APIXABAN SCH MG: 5 TABLET, FILM COATED ORAL at 08:10

## 2020-06-03 RX ADMIN — METRONIDAZOLE SCH MG: 500 TABLET ORAL at 08:10

## 2020-06-03 RX ADMIN — METRONIDAZOLE SCH MG: 500 TABLET ORAL at 15:59

## 2020-06-03 RX ADMIN — Medication SCH MG: at 20:39

## 2020-06-03 RX ADMIN — SODIUM CHLORIDE SCH ML: 9 INJECTION, SOLUTION INTRAMUSCULAR; INTRAVENOUS; SUBCUTANEOUS at 05:26

## 2020-06-03 RX ADMIN — SERTRALINE HYDROCHLORIDE SCH MG: 50 TABLET ORAL at 08:10

## 2020-06-03 RX ADMIN — ACETAMINOPHEN PRN MG: 325 TABLET ORAL at 05:25

## 2020-06-03 RX ADMIN — Medication SCH MG: at 08:09

## 2020-06-03 RX ADMIN — ATORVASTATIN CALCIUM SCH MG: 20 TABLET, FILM COATED ORAL at 20:39

## 2020-06-03 RX ADMIN — DIGOXIN SCH MG: 125 TABLET ORAL at 08:10

## 2020-06-03 RX ADMIN — Medication SCH UNITS: at 05:25

## 2020-06-03 RX ADMIN — SODIUM CHLORIDE SCH ML: 9 INJECTION, SOLUTION INTRAMUSCULAR; INTRAVENOUS; SUBCUTANEOUS at 18:00

## 2020-06-03 RX ADMIN — METOPROLOL TARTRATE SCH MG: 25 TABLET, FILM COATED ORAL at 05:26

## 2020-06-03 RX ADMIN — METOPROLOL TARTRATE SCH MG: 25 TABLET, FILM COATED ORAL at 00:00

## 2020-06-03 RX ADMIN — Medication SCH UNITS: at 19:12

## 2020-06-03 RX ADMIN — METOPROLOL SUCCINATE SCH MG: 25 TABLET, EXTENDED RELEASE ORAL at 20:40

## 2020-06-03 RX ADMIN — OMEPRAZOLE SCH MG: 20 CAPSULE, DELAYED RELEASE ORAL at 08:10

## 2020-06-03 NOTE — IPNPDOC
Text Note


Date of Service


The patient was seen on 6/3/20.





NOTE


Subjective:


Patient is a 80-year-old  female with a PMHx of Diverticulitis s/p 

abscess / perforation, Dementia, Hypothyroidism, A. fib, CVA , who presented to 

the hospital after she was found unconscious at her nursing home. She was found 

to be in A. fib with RVR.





Patient had a recent admission for which she was found to have a perforated 

diverticulum with abscess collection. She had an iron that a drain placed on 

4/29 and cultures have grown Escherichia coli, Klebsiella pneumonia and 

Streptococcus, as well as anaerobic bacteria.





Patient was seen and examined at the bedside. Currently, patient reports that he

feels fine. He denies any chest pain, shortness breath or palpitations. He 

denies nausea, vomiting, abdominal discomfort, diarrhea, or urinary discomfort.





Objective:


Vitals (See below)


General: Lying in bed, appears comfortable, Awake / Alert


HEENT: NC, AT


CVS: +S1S2


Lungs: Air entry fair bilaterally without auscultated rhonchi, crackles or 

wheezing


Abdomen: Soft, nondistended and nontender, +Drain present


Extremities: LE with 1+ pitting edema, - Calf tenderness





Assessment and plan:


Sepsis - 2/2 intra-abdominal abscess


- Currently, patient appears asymptomatic


- Hemodynamically stable and afebrile


- Leukocytosis has improved. No lactic acidosis


- Blood cultures 5/21, no growth at 5 days; blood cultures 5/31, preliminary 

negative at 48 hours


- Abscess culture 5/21: Escherichia coli and Streptococcus mitis


- CT abdomen / pelvis 6/1: Small right hemipelvic air fluid level as described 

above.  Possible abscess recurrence. This needs to be correlated clinically with

appropriate followup. Other findings as described above.


- c/w Ceftriaxone and Flagyl 


- Infectious disease and general surgery are on consultation; we appreciate 

their input





Possible diastolic CHF


- LE with evidence of edema, no reported SOB


- ECHO 4/30: mild concentric LV hypertrophy, EF 65%, Some degree of LV diastolic

function - unable to evaluate with Afib, elevated CVP, moderate AV sclerosis, 

mild MR, 


- Will provide transfusion now


- Will resume furosemide today 





A. fib with RVR


- Was digoxin loaded yesterday 6/2


- Digoxin level appropriate currently


- c/w adjusted rate / rhythm control with metoprolol / digoxin 


- c/w full anticoagulation with Eliquis





Normocytic anemia


- Hg has trended down


- No evidence of bleeding


- Will transfuse 2 units of PRBC


- Discussed with surgery; plan for colonoscopy - likely Monday; will hold 

Eliquis on Saturday in anticipation of colonoscopy 





Diverticulitis s/p abscess / perforation


- See above





Hypomagnesemia


- Will c/w Supplementation 





Dementia





Hypothyroidism


- c/w Levothyroxine 





CVA


- c/w Atorvastatin and Eliquis 





GERD


- c/w Omeprazole 





DVT prophylaxis


- c/w full anticoagulation with Eliquis





Disposition:


- Possible colonoscopy on Monday 


- Transfuse 2 units PRBC today 


- Hold eliquis starting on Saturday





VS,Fishbone, I+O


VS, Fishbone, I+O


Laboratory Tests


6/3/20 04:28











Vital Signs








  Date Time  Temp Pulse Resp B/P (MAP) Pulse Ox O2 Delivery O2 Flow Rate FiO2


 


6/3/20 12:31  85  110/78    


 


6/3/20 12:00 97.0  18  95 Room Air  














I&O- Last 24 Hours up to 6 AM 


 


 6/3/20





 06:00


 


Intake Total 1310.0 ml


 


Output Total 1650 ml


 


Balance -340.0 ml

















ALYSSIA COBURN MD                 Bulmaro 3, 2020 12:56

## 2020-06-04 VITALS — DIASTOLIC BLOOD PRESSURE: 67 MMHG | SYSTOLIC BLOOD PRESSURE: 115 MMHG

## 2020-06-04 VITALS — SYSTOLIC BLOOD PRESSURE: 122 MMHG | DIASTOLIC BLOOD PRESSURE: 88 MMHG

## 2020-06-04 VITALS — SYSTOLIC BLOOD PRESSURE: 127 MMHG | DIASTOLIC BLOOD PRESSURE: 83 MMHG

## 2020-06-04 LAB
ALBUMIN SERPL BCG-MCNC: 2.3 GM/DL (ref 3.2–5.2)
ALT SERPL W P-5'-P-CCNC: 17 U/L (ref 12–78)
BILIRUB SERPL-MCNC: 0.7 MG/DL (ref 0.2–1)
BUN SERPL-MCNC: 4 MG/DL (ref 7–18)
CALCIUM SERPL-MCNC: 8 MG/DL (ref 8.8–10.2)
CHLORIDE SERPL-SCNC: 102 MEQ/L (ref 98–107)
CO2 SERPL-SCNC: 26 MEQ/L (ref 21–32)
CREAT SERPL-MCNC: 0.3 MG/DL (ref 0.55–1.3)
CRP SERPL-MCNC: 2.13 MG/DL (ref 0–0.3)
DIGOXIN SERPL-MCNC: 0.9 NG/ML (ref 0.5–2)
GFR SERPL CREATININE-BSD FRML MDRD: > 60 ML/MIN/{1.73_M2} (ref 39–?)
GLUCOSE SERPL-MCNC: 69 MG/DL (ref 70–100)
HCT VFR BLD AUTO: 40.4 % (ref 36–47)
HGB BLD-MCNC: 13.9 G/DL (ref 12–15.5)
MAGNESIUM SERPL-MCNC: 1.7 MG/DL (ref 1.8–2.4)
MCH RBC QN AUTO: 28.9 PG (ref 27–33)
MCHC RBC AUTO-ENTMCNC: 34.4 G/DL (ref 32–36.5)
MCV RBC AUTO: 84 FL (ref 80–96)
PLATELET # BLD AUTO: 339 10^3/UL (ref 150–450)
POTASSIUM SERPL-SCNC: 4.2 MEQ/L (ref 3.5–5.1)
PROT SERPL-MCNC: 6.3 GM/DL (ref 6.4–8.2)
RBC # BLD AUTO: 4.81 10^6/UL (ref 4–5.4)
SODIUM SERPL-SCNC: 135 MEQ/L (ref 136–145)
WBC # BLD AUTO: 10.5 10^3/UL (ref 4–10)

## 2020-06-04 RX ADMIN — Medication SCH MG: at 09:12

## 2020-06-04 RX ADMIN — OMEPRAZOLE SCH MG: 20 CAPSULE, DELAYED RELEASE ORAL at 09:12

## 2020-06-04 RX ADMIN — Medication SCH UNITS: at 05:44

## 2020-06-04 RX ADMIN — Medication SCH MG: at 09:10

## 2020-06-04 RX ADMIN — SERTRALINE HYDROCHLORIDE SCH MG: 50 TABLET ORAL at 09:12

## 2020-06-04 RX ADMIN — METRONIDAZOLE SCH MG: 500 TABLET ORAL at 09:12

## 2020-06-04 RX ADMIN — LEVOTHYROXINE SODIUM SCH MCG: 50 TABLET ORAL at 05:43

## 2020-06-04 RX ADMIN — METOPROLOL SUCCINATE SCH MG: 25 TABLET, EXTENDED RELEASE ORAL at 09:11

## 2020-06-04 RX ADMIN — ACETAMINOPHEN PRN MG: 325 TABLET ORAL at 10:30

## 2020-06-04 RX ADMIN — SODIUM CHLORIDE SCH ML: 9 INJECTION, SOLUTION INTRAMUSCULAR; INTRAVENOUS; SUBCUTANEOUS at 05:44

## 2020-06-04 RX ADMIN — APIXABAN SCH MG: 5 TABLET, FILM COATED ORAL at 09:12

## 2020-06-04 RX ADMIN — POTASSIUM CHLORIDE SCH MEQ: 750 TABLET, EXTENDED RELEASE ORAL at 09:12

## 2020-06-04 RX ADMIN — DIGOXIN SCH MG: 125 TABLET ORAL at 09:12

## 2020-06-04 NOTE — DS.PDOC
Discharge Summary


General


Date of Admission


May 20, 2020 at 13:50


Date of Discharge


6/4/2020





Discharge Summary


PROCEDURES PERFORMED DURING STAY: 


[None].





ADMITTING DIAGNOSES / DISCHARGE DIAGNOSES:


Sepsis - 2/2 intra-abdominal abscess


Possible diastolic CHF


A. fib with RVR


Normocytic anemia


Right breast enlargement


Diverticulitis s/p abscess / perforation


Hypomagnesemia


Dementia


Hypothyroidism


CVA


GERD


DVT prophylaxis





COMPLICATIONS/CHIEF COMPLAINT: 


Weakness / Hypotension





HISTORY OF PRESENT ILLNESS:


Patient is a 80-year-old  female with a PMHx of Diverticulitis s/p 

abscess / perforation, Dementia, Hypothyroidism, A. fib, CVA , who presented to 

the hospital after she was found unconscious at her nursing home. She was found 

to be in A. fib with RVR.





Patient had a recent admission for which she was found to have a perforated 

diverticulum with abscess collection. She had an iron that a drain placed on 

4/29 and cultures have grown Escherichia coli, Klebsiella pneumonia and 

Streptococcus, as well as anaerobic bacteria.





HOSPITAL COURSE:


Sepsis - 2/2 intra-abdominal abscess


- Currently, patient appears asymptomatic


- Remains hemodynamically stable and afebrile


- Blood cultures 5/21, no growth at 5 days; blood cultures 5/31, preliminary 

negative at 48 hours


- Abscess culture 5/21: Escherichia coli and Streptococcus mitis


- CT abdomen / pelvis 6/1: Small right hemipelvic air fluid level as described 

above.  Possible abscess recurrence. This needs to be correlated clinically with

 appropriate followup. Other findings as described above.


- Will DC Ceftriaxone and Flagyl; will adjust antibiotics to Cefdinir and Flagyl

 by mouth


- Infectious disease and general surgery are on consultation; we appreciate 

their input


- We'll have outpatient follow-up with infectious disease and general surgery 

within 7 days





Possible diastolic CHF


- Patient is saturating well on room air. Auscultation of lungs is clear. Lower 

extremity do reveal edema


- ECHO 4/30: mild concentric LV hypertrophy, EF 65%, Some degree of LV diastolic

 function - unable to evaluate with Afib, elevated CVP, moderate AV sclerosis, 

mild MR, 


- c/w Furosemide 





A. fib with RVR


- Ray remains optimized


- Was digoxin loaded 6/2


- Digoxin level this morning remains appropriate


- c/w adjusted rate / rhythm control with metoprolol / digoxin 


- c/w full anticoagulation with Eliquis





Normocytic anemia


- Hg has responded appropriately


- No evidence of bleeding


- s/p 2 units of PRBC


- Discussed with surgery; initial plans were for colonoscopy on 6/8; however, 

patient has refused to pursue colonoscopy


- Risks and benefits were discussed with patient at this point she does not want

 to proceed


- Will have outpatient follow-up with surgery, Dr. Trinidad  





Right breast enlargement


- Patient has reported that her right breast is greater than her left and this 

has been an ongoing problem for several years


- Examination of the right breast does not reveal any evidence of infection; 

overlying skin does appear to have changes


- Advised patient of the findings may be consistent with malignancy and that a 

mammogram would be highly appropriate at this time


- Patient has indicated that she does not want to proceed with mammogram or any 

imaging studies at this time


- Patient was advised of the risks and benefits; she has verbalized 

understanding





Diverticulitis s/p abscess / perforation


- See above





Hypomagnesemia


- c/w Supplementation orally





Dementia





Hypothyroidism


- c/w Levothyroxine 





CVA


- c/w Atorvastatin and Eliquis 





GERD


- c/w Omeprazole 





DVT prophylaxis


- c/w full anticoagulation with Eliquis





DISCHARGE MEDICATIONS: 


Please see below.


 


ALLERGIES: 


Please see below.





PHYSICAL EXAMINATION ON DISCHARGE:


Vitals (See below)


General: Lying in bed, appears comfortable, Awake / Alert / Oriented x3


HEENT: NC, AT


CVS: +S1S2


Lungs: Fair air entry bilaterally without any auscultated crackles, wheezing or 

rhonchi


Abdomen: Abdomen remains soft without any palpated tenderness, remains 

nondistended, +Drain present


Extremities: Lower extremities do reveal 1+ pitting edema, - Calf tenderness





LABORATORY DATA: 


Please see below.





ACTIVITY: 


[As tolerated].





DISCHARGE PLAN:


Follow-up with primary care provider, Dr. Francois, Dr. Trinidad and Cardiology 

within 7 days


Remain compliant with treatment plan and medications


Return to the ER if you experience any problems





DISPOSITION:


[facility] 





DISCHARGE CONDITION: 


[Stable].





TIME SPENT ON DISCHARGE: 


35 minutes





Vital Signs/I&Os





Vital Signs








  Date Time  Temp Pulse Resp B/P (MAP) Pulse Ox O2 Delivery O2 Flow Rate FiO2


 


6/4/20 09:12  91      


 


6/4/20 09:11    122/88    


 


6/4/20 08:00 97.1  17  99 Room Air  














I&O- Last 24 Hours up to 6 AM 


 


 6/4/20





 06:00


 


Intake Total 2050.0 ml


 


Output Total 2936 ml


 


Balance -886.0 ml











Laboratory Data


Labs 24H


Laboratory Tests 2


6/4/20 05:49: 


Nucleated Red Blood Cells % (auto) 0.0, Anion Gap 7L, Glomerular Filtration Rate

 > 60.0, Calcium Level 8.0L, Magnesium Level 1.7L, Total Bilirubin 0.7#, 

Aspartate Amino Transf (AST/SGOT) 29, Alanine Aminotransferase (ALT/SGPT) 17, 

Alkaline Phosphatase 83, C-Reactive Protein, Quantitative 2.13H, Total Protein 

6.3#L, Albumin 2.3#L, Albumin/Globulin Ratio 0.6L, Digoxin Level 0.9


CBC/BMP


Laboratory Tests


6/4/20 05:49











Microbiology





Microbiology


6/2/20 Blood Culture - Preliminary, Resulted


         No growth after 24 hours . All specim...


6/2/20 Blood Culture - Preliminary, Resulted


         No growth after 24 hours . All specim...


5/31/20 Blood Culture - Preliminary, Resulted


          No Growth after 72 hours. All specime...


5/28/20 Coronavirus COVID-19 PCR ([facility]) - Final, Complete





Discharge Medications


Scheduled


Apixaban (Eliquis) 5 Mg Tablet, 5 MG PO BID, (Reported)


Atenolol (Atenolol) 25 Mg Tablet, 25 MG PO DAILY, (Reported)


Atorvastatin Calcium (Atorvastatin Calcium) 40 Mg Tab, 40 MG PO QHS, (Reported)


Cefdinir (Cefdinir) 300 Mg Capsule, 1 CAP PO BID


Digoxin (Digoxin) 125 Mcg Tablet, 0.125 MG PO DAILY


Donepezil HCl (Donepezil HCl) 10 Mg Tablet, 10 MG PO QHS, (Reported)


Ferrous Sulfate (Ferrous Sulfate) 325 Mg Tablet.dr, 1 TAB PO BID


Furosemide (Lasix) 40 Mg Tablet, 40 MG PO DAILY, (Reported)


Levothyroxine Sodium (Levothyroxine Sodium) 50 Mcg Tab, 50 MCG PO DAILY, 

(Reported)


Magnesium Chloride (Mag64) 64 Mg Tablet.dr, 64 MG PO BID


Metoprolol Succinate (Metoprolol Succinate) 25 Mg Tab.er.24h, 25 MG PO BID


Metronidazole (Flagyl) 500 Mg Tablet, 500 MG PO TID


Multivitamins (Thera M Plus Tablet) 1 Tab Tab, 1 TAB PO DAILY, (Reported)


Omeprazole (Omeprazole) 20 Mg Cap, 20 MG PO BID, (Reported)


Sertraline HCl (Sertraline HCl) 50 Mg Tablet, 50 MG PO DAILY, (Reported)





Scheduled PRN


Acetaminophen (Tylenol) 325 Mg Tablet, 650 MG PO QID PRN for PAIN, (Reported)





Allergies


Coded Allergies:  


     No Known Allergies (Unverified , 5/5/20)











ALYSSIA COBURN MD                 Jun 4, 2020 11:01

## 2020-06-09 ENCOUNTER — HOSPITAL ENCOUNTER (OUTPATIENT)
Dept: HOSPITAL 53 - SKLAB3 | Age: 80
End: 2020-06-09
Attending: FAMILY MEDICINE

## 2020-06-09 DIAGNOSIS — D72.829: Primary | ICD-10-CM

## 2020-06-09 LAB
BASOPHILS # BLD AUTO: 0 10^3/UL (ref 0–0.2)
BASOPHILS NFR BLD AUTO: 0.2 % (ref 0–1)
BUN SERPL-MCNC: 8 MG/DL (ref 7–18)
CALCIUM SERPL-MCNC: 8.3 MG/DL (ref 8.8–10.2)
CHLORIDE SERPL-SCNC: 100 MEQ/L (ref 98–107)
CO2 SERPL-SCNC: 27 MEQ/L (ref 21–32)
CREAT SERPL-MCNC: 0.46 MG/DL (ref 0.55–1.3)
EOSINOPHIL # BLD AUTO: 0 10^3/UL (ref 0–0.5)
EOSINOPHIL NFR BLD AUTO: 0.3 % (ref 0–3)
GFR SERPL CREATININE-BSD FRML MDRD: > 60 ML/MIN/{1.73_M2} (ref 39–?)
GLUCOSE SERPL-MCNC: 117 MG/DL (ref 70–100)
HCT VFR BLD AUTO: 37.5 % (ref 36–47)
HGB BLD-MCNC: 12.5 G/DL (ref 12–15.5)
LYMPHOCYTES # BLD AUTO: 1.3 10^3/UL (ref 1.5–5)
LYMPHOCYTES NFR BLD AUTO: 11.8 % (ref 24–44)
MCH RBC QN AUTO: 28.9 PG (ref 27–33)
MCHC RBC AUTO-ENTMCNC: 33.3 G/DL (ref 32–36.5)
MCV RBC AUTO: 86.6 FL (ref 80–96)
MONOCYTES # BLD AUTO: 0.7 10^3/UL (ref 0–0.8)
MONOCYTES NFR BLD AUTO: 6.5 % (ref 0–5)
NEUTROPHILS # BLD AUTO: 8.5 10^3/UL (ref 1.5–8.5)
NEUTROPHILS NFR BLD AUTO: 80.7 % (ref 36–66)
PLATELET # BLD AUTO: 361 10^3/UL (ref 150–450)
POTASSIUM SERPL-SCNC: 3 MEQ/L (ref 3.5–5.1)
RBC # BLD AUTO: 4.33 10^6/UL (ref 4–5.4)
SODIUM SERPL-SCNC: 135 MEQ/L (ref 136–145)
WBC # BLD AUTO: 10.6 10^3/UL (ref 4–10)

## 2020-06-10 ENCOUNTER — HOSPITAL ENCOUNTER (OUTPATIENT)
Dept: HOSPITAL 53 - SKLAB3 | Age: 80
End: 2020-06-10
Attending: FAMILY MEDICINE

## 2020-06-10 DIAGNOSIS — R19.7: Primary | ICD-10-CM

## 2020-06-11 ENCOUNTER — HOSPITAL ENCOUNTER (OUTPATIENT)
Dept: HOSPITAL 53 - SKLAB3 | Age: 80
End: 2020-06-11
Attending: FAMILY MEDICINE

## 2020-06-11 DIAGNOSIS — E87.6: Primary | ICD-10-CM

## 2020-06-11 LAB
BUN SERPL-MCNC: 10 MG/DL (ref 7–18)
CALCIUM SERPL-MCNC: 7.8 MG/DL (ref 8.8–10.2)
CHLORIDE SERPL-SCNC: 101 MEQ/L (ref 98–107)
CO2 SERPL-SCNC: 28 MEQ/L (ref 21–32)
CREAT SERPL-MCNC: 0.35 MG/DL (ref 0.55–1.3)
GFR SERPL CREATININE-BSD FRML MDRD: > 60 ML/MIN/{1.73_M2} (ref 39–?)
GLUCOSE SERPL-MCNC: 79 MG/DL (ref 70–100)
POTASSIUM SERPL-SCNC: 3.2 MEQ/L (ref 3.5–5.1)
SODIUM SERPL-SCNC: 136 MEQ/L (ref 136–145)

## 2020-06-13 NOTE — IPN
DATE:  06/12/2020

 

Jalyn was seen in the nursing home on 06/12/2020. She has no new complaints.

According to nursing, she has been having loose stools.  She had a

gastrointestinal (GI) panel that was negative.  She still has her drain in the

right upper quadrant, which has greenish-brownish purulent discharge.  There was

about 20 mL in the past 24 hours.  Last CT abdomen and pelvis: 4 x 3 cm where the

catheter tip is placed that has an air-fluid level.  The patient was treated

inpatient from May 20 to June 4 for 14 days with intravenous (IV) Rocephin and

oral Flagyl.  On June 5 the patient was switched to cefdinir and oral Flagyl.

She is currently day #21, off antibiotics from drainage procedure.  Abdominal

abscess culture had Streptococcus mitis, Escherichia (E) coli, and two species of

bacteroides.  Labs done on June 11:  Sodium 136, potassium 3.2, chloride 101,

bicarbonate 28 , BUN 10, creatinine 0.35, glucose 79.  White count 10.6,

hemoglobin 12.5, hematocrit 37.5, platelets 361.  CRP 2.13.

 

PHYSICAL EXAMINATION:

Blood pressure was 122/64, oxygen saturation 96% on room air, pulse 92,

respirations 18, temperature is 96.9.

HEART:  Normal S1, S2.  No murmurs appreciated.

LUNGS:  Clear.  Diminished at the bases.

ABDOMEN:  Soft, nontender.  Right upper quadrant drain with 5 mL of

greenish-brownish purulent-looking material in the bag.

EXTREMITIES:  With +2 pitting edema on the right side, +1 pitting edema on the

left side.  Nontender.  No erythema.

 

IMPRESSION:

1.  Intra-abdominal abscess, polymicrobial, on oral cefdinir and Flagyl, day #22

from drainage procedure.  The patient will continue 6 weeks of total

antibiotics.

 

2.  History of diverticulitis, source of infection.  The patient needs

colonoscopy but has refused.

 

3.  Diarrhea.  GI panel was negative for Clostridium (C) difficile.  The patient

was started on probiotics.

 

PLAN:  Continue cefdinir and Flagyl until July 2, which would be a total of 6

weeks from last drainage procedure and add  CRP and ESR to weekly labs.  Please

obtain followup CT abdomen and pelvis end of June to document clearance of

abscess.  The patient will followup also with Dr. Trinidad on June 16.

## 2020-06-16 ENCOUNTER — HOSPITAL ENCOUNTER (OUTPATIENT)
Dept: HOSPITAL 53 - SKLAB3 | Age: 80
End: 2020-06-16
Attending: FAMILY MEDICINE

## 2020-06-16 DIAGNOSIS — E87.6: Primary | ICD-10-CM

## 2020-06-16 DIAGNOSIS — L02.211: ICD-10-CM

## 2020-06-16 LAB
BUN SERPL-MCNC: 8 MG/DL (ref 7–18)
CALCIUM SERPL-MCNC: 8 MG/DL (ref 8.8–10.2)
CHLORIDE SERPL-SCNC: 99 MEQ/L (ref 98–107)
CO2 SERPL-SCNC: 28 MEQ/L (ref 21–32)
CREAT SERPL-MCNC: 0.38 MG/DL (ref 0.55–1.3)
CRP SERPL-MCNC: 1.27 MG/DL (ref 0–0.3)
EOSINOPHIL NFR BLD MANUAL: 2 % (ref 0–3)
ERYTHROCYTE [SEDIMENTATION RATE] IN BLOOD BY WESTERGREN METHOD: 34 MM/HR (ref 0–30)
GFR SERPL CREATININE-BSD FRML MDRD: > 60 ML/MIN/{1.73_M2} (ref 39–?)
GLUCOSE SERPL-MCNC: 78 MG/DL (ref 70–100)
HCT VFR BLD AUTO: 37 % (ref 36–47)
HGB BLD-MCNC: 12.3 G/DL (ref 12–15.5)
LYMPHOCYTES NFR BLD MANUAL: 47 % (ref 16–44)
MCH RBC QN AUTO: 28.9 PG (ref 27–33)
MCHC RBC AUTO-ENTMCNC: 33.2 G/DL (ref 32–36.5)
MCV RBC AUTO: 86.9 FL (ref 80–96)
MONOCYTES NFR BLD MANUAL: 4 % (ref 0–5)
NEUTROPHILS NFR BLD MANUAL: 45 % (ref 28–66)
PLATELET # BLD AUTO: 318 10^3/UL (ref 150–450)
PLATELET BLD QL SMEAR: NORMAL
POTASSIUM SERPL-SCNC: 3.7 MEQ/L (ref 3.5–5.1)
RBC # BLD AUTO: 4.26 10^6/UL (ref 4–5.4)
SODIUM SERPL-SCNC: 136 MEQ/L (ref 136–145)
WBC # BLD AUTO: 6.3 10^3/UL (ref 4–10)

## 2020-06-22 ENCOUNTER — HOSPITAL ENCOUNTER (OUTPATIENT)
Dept: HOSPITAL 53 - SKLAB3 | Age: 80
End: 2020-06-22
Attending: FAMILY MEDICINE

## 2020-06-22 DIAGNOSIS — R53.83: Primary | ICD-10-CM

## 2020-06-22 LAB
ANISOCYTOSIS BLD QL SMEAR: (no result)
BUN SERPL-MCNC: 10 MG/DL (ref 7–18)
C DIFF TOX GENS STL QL NAA+PROBE: NEGATIVE
CALCIUM SERPL-MCNC: 8.1 MG/DL (ref 8.8–10.2)
CHLORIDE SERPL-SCNC: 103 MEQ/L (ref 98–107)
CO2 SERPL-SCNC: 30 MEQ/L (ref 21–32)
CREAT SERPL-MCNC: 0.33 MG/DL (ref 0.55–1.3)
GFR SERPL CREATININE-BSD FRML MDRD: > 60 ML/MIN/{1.73_M2} (ref 39–?)
GLUCOSE SERPL-MCNC: 70 MG/DL (ref 70–100)
HCT VFR BLD AUTO: 34.7 % (ref 36–47)
HGB BLD-MCNC: 11.6 G/DL (ref 12–15.5)
LYMPHOCYTES NFR BLD MANUAL: 15 % (ref 16–44)
MCH RBC QN AUTO: 28.6 PG (ref 27–33)
MCHC RBC AUTO-ENTMCNC: 33.4 G/DL (ref 32–36.5)
MCV RBC AUTO: 85.7 FL (ref 80–96)
MONOCYTES NFR BLD MANUAL: 2 % (ref 0–5)
NEUTROPHILS NFR BLD MANUAL: 78 % (ref 28–66)
PLATELET # BLD AUTO: 322 10^3/UL (ref 150–450)
PLATELET BLD QL SMEAR: NORMAL
POTASSIUM SERPL-SCNC: 3 MEQ/L (ref 3.5–5.1)
RBC # BLD AUTO: 4.05 10^6/UL (ref 4–5.4)
SODIUM SERPL-SCNC: 138 MEQ/L (ref 136–145)
VARIANT LYMPHS NFR BLD MANUAL: 1 % (ref 0–5)
WBC # BLD AUTO: 11 10^3/UL (ref 4–10)

## 2020-06-23 ENCOUNTER — HOSPITAL ENCOUNTER (OUTPATIENT)
Dept: HOSPITAL 53 - SKLAB3 | Age: 80
End: 2020-06-23
Attending: FAMILY MEDICINE

## 2020-06-23 DIAGNOSIS — D64.9: Primary | ICD-10-CM

## 2020-06-23 LAB
BUN SERPL-MCNC: 8 MG/DL (ref 7–18)
CALCIUM SERPL-MCNC: 8 MG/DL (ref 8.8–10.2)
CHLORIDE SERPL-SCNC: 106 MEQ/L (ref 98–107)
CO2 SERPL-SCNC: 25 MEQ/L (ref 21–32)
CREAT SERPL-MCNC: 0.27 MG/DL (ref 0.55–1.3)
GFR SERPL CREATININE-BSD FRML MDRD: > 60 ML/MIN/{1.73_M2} (ref 39–?)
GLUCOSE SERPL-MCNC: 72 MG/DL (ref 70–100)
HCT VFR BLD AUTO: 35.9 % (ref 36–47)
HGB BLD-MCNC: 12.1 G/DL (ref 12–15.5)
MCH RBC QN AUTO: 29.1 PG (ref 27–33)
MCHC RBC AUTO-ENTMCNC: 33.7 G/DL (ref 32–36.5)
MCV RBC AUTO: 86.3 FL (ref 80–96)
PLATELET # BLD AUTO: 316 10^3/UL (ref 150–450)
POTASSIUM SERPL-SCNC: 3.5 MEQ/L (ref 3.5–5.1)
RBC # BLD AUTO: 4.16 10^6/UL (ref 4–5.4)
SODIUM SERPL-SCNC: 137 MEQ/L (ref 136–145)
WBC # BLD AUTO: 12.4 10^3/UL (ref 4–10)

## 2020-06-24 LAB
ANISOCYTOSIS BLD QL SMEAR: (no result)
HCT VFR BLD AUTO: 39 % (ref 36–47)
HGB BLD-MCNC: 13.1 G/DL (ref 12–15.5)
LYMPHOCYTES NFR BLD MANUAL: 15 % (ref 16–44)
MCH RBC QN AUTO: 29.1 PG (ref 27–33)
MCHC RBC AUTO-ENTMCNC: 33.6 G/DL (ref 32–36.5)
MCV RBC AUTO: 86.7 FL (ref 80–96)
MONOCYTES NFR BLD MANUAL: 9 % (ref 0–5)
NEUTROPHILS NFR BLD MANUAL: 70 % (ref 28–66)
PLATELET # BLD AUTO: 345 10^3/UL (ref 150–450)
PLATELET BLD QL SMEAR: NORMAL
RBC # BLD AUTO: 4.5 10^6/UL (ref 4–5.4)
WBC # BLD AUTO: 11.2 10^3/UL (ref 4–10)

## 2020-06-25 ENCOUNTER — HOSPITAL ENCOUNTER (OUTPATIENT)
Dept: HOSPITAL 53 - SKLAB3 | Age: 80
End: 2020-06-25
Attending: FAMILY MEDICINE

## 2020-06-25 DIAGNOSIS — E86.0: Primary | ICD-10-CM

## 2020-06-25 LAB
BUN SERPL-MCNC: 16 MG/DL (ref 7–18)
CALCIUM SERPL-MCNC: 8.3 MG/DL (ref 8.8–10.2)
CHLORIDE SERPL-SCNC: 105 MEQ/L (ref 98–107)
CO2 SERPL-SCNC: 25 MEQ/L (ref 21–32)
CREAT SERPL-MCNC: 0.45 MG/DL (ref 0.55–1.3)
GFR SERPL CREATININE-BSD FRML MDRD: > 60 ML/MIN/{1.73_M2} (ref 39–?)
GLUCOSE SERPL-MCNC: 79 MG/DL (ref 70–100)
POTASSIUM SERPL-SCNC: 3.2 MEQ/L (ref 3.5–5.1)
SODIUM SERPL-SCNC: 137 MEQ/L (ref 136–145)

## 2020-06-29 ENCOUNTER — HOSPITAL ENCOUNTER (EMERGENCY)
Dept: HOSPITAL 53 - M ED | Age: 80
Discharge: HOME | End: 2020-06-29
Payer: MEDICARE

## 2020-06-29 VITALS — BODY MASS INDEX: 19.27 KG/M2 | WEIGHT: 112.88 LBS | HEIGHT: 64 IN

## 2020-06-29 VITALS — SYSTOLIC BLOOD PRESSURE: 102 MMHG | DIASTOLIC BLOOD PRESSURE: 61 MMHG

## 2020-06-29 DIAGNOSIS — I48.91: ICD-10-CM

## 2020-06-29 DIAGNOSIS — R56.9: Primary | ICD-10-CM

## 2020-06-29 DIAGNOSIS — Z79.01: ICD-10-CM

## 2020-06-29 DIAGNOSIS — Z79.899: ICD-10-CM

## 2020-06-29 DIAGNOSIS — F03.90: ICD-10-CM

## 2020-06-29 DIAGNOSIS — I50.9: ICD-10-CM

## 2020-06-29 DIAGNOSIS — N39.0: ICD-10-CM

## 2020-06-29 DIAGNOSIS — I11.0: ICD-10-CM

## 2020-06-29 DIAGNOSIS — Z86.73: ICD-10-CM

## 2020-06-29 LAB
ALBUMIN SERPL BCG-MCNC: 2.2 GM/DL (ref 3.2–5.2)
ALT SERPL W P-5'-P-CCNC: 11 U/L (ref 12–78)
AMPHETAMINES UR QL SCN: NEGATIVE
APAP SERPL-MCNC: 2.6 UG/ML (ref 10–30)
BARBITURATES UR QL SCN: NEGATIVE
BASOPHILS # BLD AUTO: 0.1 10^3/UL (ref 0–0.2)
BASOPHILS NFR BLD AUTO: 0.8 % (ref 0–1)
BENZODIAZ UR QL SCN: NEGATIVE
BILIRUB CONJ SERPL-MCNC: 0.2 MG/DL (ref 0–0.2)
BILIRUB SERPL-MCNC: 0.4 MG/DL (ref 0.2–1)
BUN SERPL-MCNC: 15 MG/DL (ref 7–18)
BZE UR QL SCN: NEGATIVE
CALCIUM SERPL-MCNC: 8 MG/DL (ref 8.8–10.2)
CANNABINOIDS UR QL SCN: NEGATIVE
CHLORIDE SERPL-SCNC: 107 MEQ/L (ref 98–107)
CK MB CFR.DF SERPL CALC: 3.55
CK MB SERPL-MCNC: 1.1 NG/ML (ref ?–3.6)
CK SERPL-CCNC: 31 U/L (ref 26–192)
CO2 SERPL-SCNC: 23 MEQ/L (ref 21–32)
CREAT SERPL-MCNC: 0.42 MG/DL (ref 0.55–1.3)
EOSINOPHIL # BLD AUTO: 0 10^3/UL (ref 0–0.5)
EOSINOPHIL NFR BLD AUTO: 0.4 % (ref 0–3)
ETHANOL SERPL-MCNC: < 0.003 % (ref 0–0.01)
GFR SERPL CREATININE-BSD FRML MDRD: > 60 ML/MIN/{1.73_M2} (ref 39–?)
GLUCOSE SERPL-MCNC: 79 MG/DL (ref 70–100)
HCT VFR BLD AUTO: 36.4 % (ref 36–47)
HGB BLD-MCNC: 12 G/DL (ref 12–15.5)
LYMPHOCYTES # BLD AUTO: 2 10^3/UL (ref 1.5–5)
LYMPHOCYTES NFR BLD AUTO: 24.7 % (ref 24–44)
MCH RBC QN AUTO: 28.8 PG (ref 27–33)
MCHC RBC AUTO-ENTMCNC: 33 G/DL (ref 32–36.5)
MCV RBC AUTO: 87.5 FL (ref 80–96)
METHADONE UR QL SCN: NEGATIVE
MONOCYTES # BLD AUTO: 0.8 10^3/UL (ref 0–0.8)
MONOCYTES NFR BLD AUTO: 9.8 % (ref 0–5)
NEUTROPHILS # BLD AUTO: 4.8 10^3/UL (ref 1.5–8.5)
NEUTROPHILS NFR BLD AUTO: 60.6 % (ref 36–66)
OPIATES UR QL SCN: NEGATIVE
OSMOLALITY SERPL: 274 MOSM/KG (ref 280–301)
PCP UR QL SCN: NEGATIVE
PLATELET # BLD AUTO: 309 10^3/UL (ref 150–450)
POTASSIUM SERPL-SCNC: 4.3 MEQ/L (ref 3.5–5.1)
PROT SERPL-MCNC: 5.8 GM/DL (ref 6.4–8.2)
RBC # BLD AUTO: 4.16 10^6/UL (ref 4–5.4)
SALICYLATES SERPL-MCNC: < 1.7 MG/DL (ref 5–30)
SODIUM SERPL-SCNC: 138 MEQ/L (ref 136–145)
T4 FREE SERPL-MCNC: 1.19 NG/DL (ref 0.76–1.46)
TROPONIN I SERPL-MCNC: 0.03 NG/ML (ref ?–0.1)
TSH SERPL DL<=0.005 MIU/L-ACNC: 11.4 UIU/ML (ref 0.36–3.74)
WBC # BLD AUTO: 7.9 10^3/UL (ref 4–10)

## 2020-06-29 PROCEDURE — 82140 ASSAY OF AMMONIA: CPT

## 2020-06-29 PROCEDURE — 82553 CREATINE MB FRACTION: CPT

## 2020-06-29 PROCEDURE — 83605 ASSAY OF LACTIC ACID: CPT

## 2020-06-29 PROCEDURE — 87086 URINE CULTURE/COLONY COUNT: CPT

## 2020-06-29 PROCEDURE — 93041 RHYTHM ECG TRACING: CPT

## 2020-06-29 PROCEDURE — 80048 BASIC METABOLIC PNL TOTAL CA: CPT

## 2020-06-29 PROCEDURE — 87040 BLOOD CULTURE FOR BACTERIA: CPT

## 2020-06-29 PROCEDURE — 80307 DRUG TEST PRSMV CHEM ANLYZR: CPT

## 2020-06-29 PROCEDURE — 84484 ASSAY OF TROPONIN QUANT: CPT

## 2020-06-29 PROCEDURE — 82550 ASSAY OF CK (CPK): CPT

## 2020-06-29 PROCEDURE — 84443 ASSAY THYROID STIM HORMONE: CPT

## 2020-06-29 PROCEDURE — 93005 ELECTROCARDIOGRAM TRACING: CPT

## 2020-06-29 PROCEDURE — 96361 HYDRATE IV INFUSION ADD-ON: CPT

## 2020-06-29 PROCEDURE — 80047 BASIC METABLC PNL IONIZED CA: CPT

## 2020-06-29 PROCEDURE — 96374 THER/PROPH/DIAG INJ IV PUSH: CPT

## 2020-06-29 PROCEDURE — 99285 EMERGENCY DEPT VISIT HI MDM: CPT

## 2020-06-29 PROCEDURE — 71045 X-RAY EXAM CHEST 1 VIEW: CPT

## 2020-06-29 PROCEDURE — 82803 BLOOD GASES ANY COMBINATION: CPT

## 2020-06-29 PROCEDURE — 74177 CT ABD & PELVIS W/CONTRAST: CPT

## 2020-06-29 PROCEDURE — 84439 ASSAY OF FREE THYROXINE: CPT

## 2020-06-29 PROCEDURE — 85025 COMPLETE CBC W/AUTO DIFF WBC: CPT

## 2020-06-29 PROCEDURE — 80076 HEPATIC FUNCTION PANEL: CPT

## 2020-06-29 PROCEDURE — 83930 ASSAY OF BLOOD OSMOLALITY: CPT

## 2020-06-29 PROCEDURE — 70450 CT HEAD/BRAIN W/O DYE: CPT

## 2020-06-29 NOTE — ECGEPIP
[facility] - ED

                                       

                                       Test Date:    2020

Pat Name:     VALENTIN CASTRO                Department:   

Patient ID:   [id number]                 Room:         -

Gender:       Female                   Technician:   [id number]

:          1940               Requested By: BECKI HUNG

Order Number: [id number]     Reading MD:   Sharmin Dimas

                                 Measurements

Intervals                              Axis          

Rate:         91                       P:            

NH:           0                        QRS:          -20

QRSD:         87                       T:            98

QT:           334                                    

QTc:          411                                    

                           Interpretive Statements

ATRIAL FIBRILLATION

LOW QRS VOLTAGE

POSSIBLE ANTERIOR MYOCARDIAL INFARCTION, PROBABLY OLD

decreased rate 20

Electronically Signed on 2020 9:05:50 EDT by Sharmin Dimas

## 2020-06-29 NOTE — REPVR
PROCEDURE INFORMATION: 

Exam: CT Head Without Contrast 

Exam date and time: 6/29/2020 5:19 AM 

Age: 80 years old 

Clinical indication: Pain; Headache; Additional info: Altered mental status 



TECHNIQUE: 

Imaging protocol: Computed tomography of the head without contrast. 

Radiation optimization: All CT scans at this facility use at least one of these 

dose optimization techniques: automated exposure control; mA and/or kV 

adjustment per patient size (includes targeted exams where dose is matched to 

clinical indication); or iterative reconstruction. 



COMPARISON: 

CT Head without contrast 4/28/2020 11:30 AM 



FINDINGS: 

Brain: There is an old right cerebellar infarct with encephalomalacia. 

Ventricles: There is age-related cerebral atrophy with secondary ventricular 

dilatation. 

Bones/joints: Unremarkable. No acute fracture. 

Sinuses: Visualized sinuses are unremarkable. No fluid levels. 

Mastoid air cells: Visualized mastoid air cells are well aerated. 



Soft tissues: Unremarkable. 



IMPRESSION: 

1. No CT evidence of acute intracranial hemorrhage, mass effect or midline 

shift. 

2. Age-related cerebral atrophy with Old right cerebellar infarct 



Electronically signed by: Hitesh Boyd On 06/29/2020  05:52:16 AM

## 2020-06-29 NOTE — REP
Clinical:  Altered mental status .

 

Comparison:  06/02/2020 .

 

Findings:

Evaluation is limited by positioning and portable technique.

Stable cardiomegaly and elevation to the right hemidiaphragm is again noted.

Right lower lobe opacity and effusion cannot definitively be excluded.  Remainder

of the lung fields are well-aerated and clear.

 

Impression:

Relatively chronic-appearing changes.  As above.

 

 

Electronically Signed by

Dino Deras MD 06/29/2020 08:05 A

## 2020-06-29 NOTE — REPVR
PROCEDURE INFORMATION: 

Exam: CT Abdomen And Pelvis With Contrast 

Exam date and time: 6/29/2020 5:19 AM 

Age: 80 years old 

Clinical indication: Abdominal pain; Additional info: Altered mental status 



TECHNIQUE: 

Imaging protocol: Computed tomography of the abdomen and pelvis with 

intravenous contrast. 

Radiation optimization: All CT scans at this facility use at least one of these 

dose optimization techniques: automated exposure control; mA and/or kV 

adjustment per patient size (includes targeted exams where dose is matched to 

clinical indication); or iterative reconstruction. 

Contrast material: ; Contrast volume: 100 ml; Contrast route: 

INTRAVENOUS (IV);  



COMPARISON: 

CT ABD PELVIS WITH CONTRAST 6/1/2020 11:10 AM 



FINDINGS: 

Tubes, catheters and devices: There is a pigtail catheter in the right lower 

abdominal quadrant which appears to be coiled within a small bowel loop. 



Lungs: There is mild right basilar atelectatic changes. 

Mediastinal space: There is moderate sliding hiatal hernia. 



Liver: Normal. No mass. 

Gallbladder and bile ducts: The gallbladder is distended. There is thickening 

of the gallbladder at the dome measuring up to 9 mm in thickness. 

Pancreas: Normal. No ductal dilation. 

Spleen: Normal. No splenomegaly. 

Adrenals: There is 1.4 x 1.0 cm right adrenal gland nodule on axial image 32. 

Kidneys and ureters: There is 1.3 x 1.2 cm right renal cyst. There is right 

extrarenal pelvis. 

Stomach and bowel: There is thickening of the duodenal bulb. There is an 

ill-defined air and fluid collection with thick surrounding wall seen in the 

right hemipelvis to the right of the rectosigmoid colon extending superiorly. 

The collection measures approximately 9.2 x 4.5 x 3.9 centimetres There is a 

small amount of free pelvic fluid. 

Appendix: No evidence of appendicitis. 



Intraperitoneal space: See "Stomach and bowel" finding. 

Vasculature: Unremarkable. No abdominal aortic aneurysm. 

Lymph nodes: Unremarkable. No enlarged lymph nodes. 

Bladder: There is moderate amount of air in the urinary bladder. 

Reproductive: The uterus is heterogeneous containing a 4.7 x 3.6 cm calcified 

mass. 

Bones/joints: There is severe lumbar spine scoliosis with marked multilevel 

lower thoracic and lumbar spine DJD. The patient is status post total right hip 

replacement. Small calcifications seen adjacent to the acetabulum lateral to 

the acetabular component of the prosthesis likely dystrophic calcifications. 

Soft tissues: See "Bones/joints" finding. 



IMPRESSION: 

1. 9.2 x 4.5 x 3.9 cm collection with air and fluid in the right hemipelvis 

extending superiorly to the right lower abdominal quadrant coupled with small 

amount of free pelvic fluid seen in May 2020. 

2. Mildly thickened rectosigmoid colon with diverticular disease could be 

secondary to colitis or diverticulitis. Other underlying etiologies cannot be 

excluded. Correlate with clinical history and colonoscopy. 

3. Moderate amount of air in the urinary bladder. This could be secondary to 

recent instrumentation however other etiologies such as infection with air 

producing organism or fistulous communication to the bowel cannot be completely 

excluded. Correlate with clinical history and symptoms. Follow-up is suggested. 

4. 4.7 x 3.6 cm calcified uterine mass likely fibroid. 

5. Right lower abdominal quadrant pigtail catheter appear to be within a small 

bowel loop. It is not clear if this is meant to be a drainage catheter or 

jejunostomy or ileostomy. 

6. 1.3 x 1.2 cm simple right renal cyst for which follow-up is not necessary. 

7. 1.4 x 1.0 cm right adrenal gland indeterminate nodule, unchanged since the 

prior exam. This can be further characterized with dedicated MRI or CT of the 

adrenal glands. 

8. Apparent thickening of the duodenal bulb. Correlate clinically for 

duodenitis. 

9. Distended gallbladder with significantly thickened wall at the dome. This 

could be secondary to chronic cholecystitis however underlying gallbladder wall 

pathology cannot be excluded. Correlate with gallbladder ultrasound. 

10. Moderate sliding hiatal hernia. 



Electronically signed by: Hitesh Boyd On 06/29/2020  05:50:29 AM

## 2020-07-09 ENCOUNTER — HOSPITAL ENCOUNTER (INPATIENT)
Dept: HOSPITAL 53 - M ED | Age: 80
LOS: 5 days | Discharge: NURSING, REHAB OR LONG-TERM CARE | DRG: 871 | End: 2020-07-14
Attending: INTERNAL MEDICINE | Admitting: GENERAL PRACTICE
Payer: MEDICARE

## 2020-07-09 VITALS — SYSTOLIC BLOOD PRESSURE: 97 MMHG | DIASTOLIC BLOOD PRESSURE: 68 MMHG

## 2020-07-09 VITALS — BODY MASS INDEX: 20.59 KG/M2 | HEIGHT: 64 IN | WEIGHT: 120.59 LBS

## 2020-07-09 VITALS — SYSTOLIC BLOOD PRESSURE: 88 MMHG | DIASTOLIC BLOOD PRESSURE: 53 MMHG

## 2020-07-09 VITALS — DIASTOLIC BLOOD PRESSURE: 56 MMHG | SYSTOLIC BLOOD PRESSURE: 106 MMHG

## 2020-07-09 VITALS — DIASTOLIC BLOOD PRESSURE: 69 MMHG | SYSTOLIC BLOOD PRESSURE: 103 MMHG

## 2020-07-09 VITALS — SYSTOLIC BLOOD PRESSURE: 92 MMHG | DIASTOLIC BLOOD PRESSURE: 67 MMHG

## 2020-07-09 DIAGNOSIS — Q21.1: ICD-10-CM

## 2020-07-09 DIAGNOSIS — N32.1: ICD-10-CM

## 2020-07-09 DIAGNOSIS — Z51.5: ICD-10-CM

## 2020-07-09 DIAGNOSIS — K65.1: ICD-10-CM

## 2020-07-09 DIAGNOSIS — R65.21: ICD-10-CM

## 2020-07-09 DIAGNOSIS — E87.6: ICD-10-CM

## 2020-07-09 DIAGNOSIS — F03.90: ICD-10-CM

## 2020-07-09 DIAGNOSIS — E87.1: ICD-10-CM

## 2020-07-09 DIAGNOSIS — Z79.01: ICD-10-CM

## 2020-07-09 DIAGNOSIS — E03.9: ICD-10-CM

## 2020-07-09 DIAGNOSIS — E16.2: ICD-10-CM

## 2020-07-09 DIAGNOSIS — Z66: ICD-10-CM

## 2020-07-09 DIAGNOSIS — Z11.59: ICD-10-CM

## 2020-07-09 DIAGNOSIS — I48.20: ICD-10-CM

## 2020-07-09 DIAGNOSIS — Z79.899: ICD-10-CM

## 2020-07-09 DIAGNOSIS — A41.9: Primary | ICD-10-CM

## 2020-07-09 DIAGNOSIS — E78.5: ICD-10-CM

## 2020-07-09 DIAGNOSIS — E87.2: ICD-10-CM

## 2020-07-09 DIAGNOSIS — K21.9: ICD-10-CM

## 2020-07-09 DIAGNOSIS — Z96.641: ICD-10-CM

## 2020-07-09 DIAGNOSIS — Z86.73: ICD-10-CM

## 2020-07-09 DIAGNOSIS — E43: ICD-10-CM

## 2020-07-09 DIAGNOSIS — D64.9: ICD-10-CM

## 2020-07-09 LAB
ALBUMIN SERPL BCG-MCNC: 1.8 GM/DL (ref 3.2–5.2)
ALT SERPL W P-5'-P-CCNC: 17 U/L (ref 12–78)
AMYLASE SERPL-CCNC: 28 U/L (ref 25–115)
APTT BLD: 39.5 SECONDS (ref 25–38.4)
BASE EXCESS BLDV CALC-SCNC: -9.8 MMOL/L (ref -2–2)
BILIRUB CONJ SERPL-MCNC: 0.4 MG/DL (ref 0–0.2)
BILIRUB SERPL-MCNC: 0.8 MG/DL (ref 0.2–1)
BUN SERPL-MCNC: 23 MG/DL (ref 7–18)
BURR CELLS BLD QL SMEAR: (no result)
CALCIUM SERPL-MCNC: 8 MG/DL (ref 8.8–10.2)
CHLORIDE SERPL-SCNC: 107 MEQ/L (ref 98–107)
CK MB CFR.DF SERPL CALC: 5.22
CK MB SERPL-MCNC: 1.2 NG/ML (ref ?–3.6)
CK SERPL-CCNC: 23 U/L (ref 26–192)
CO2 BLDV CALC-SCNC: 16 MEQ/L (ref 24–28)
CO2 SERPL-SCNC: 16 MEQ/L (ref 21–32)
CREAT SERPL-MCNC: 0.79 MG/DL (ref 0.55–1.3)
CRP SERPL-MCNC: 9.64 MG/DL (ref 0–0.3)
GFR SERPL CREATININE-BSD FRML MDRD: > 60 ML/MIN/{1.73_M2} (ref 39–?)
GLUCOSE SERPL-MCNC: 61 MG/DL (ref 70–100)
HCO3 BLDV-SCNC: 15 MEQ/L (ref 23–27)
HCO3 STD BLDV-SCNC: 16.5 MEQ/L
HCT VFR BLD AUTO: 33.1 % (ref 36–47)
HGB BLD-MCNC: 11 G/DL (ref 12–15.5)
IGA SERPL-MCNC: 282 MG/DL (ref 70–400)
IGG SERPL-MCNC: 1360 MG/DL (ref 681–1648)
IGM SERPL-MCNC: 48.3 MG/DL (ref 40–230)
INR PPP: 2.65
LYMPHOCYTES NFR BLD MANUAL: 3 % (ref 16–44)
MCH RBC QN AUTO: 30.1 PG (ref 27–33)
MCHC RBC AUTO-ENTMCNC: 33.2 G/DL (ref 32–36.5)
MCV RBC AUTO: 90.7 FL (ref 80–96)
METAMYELOCYTES NFR BLD MANUAL: 1 % (ref 0–0)
MONOCYTES NFR BLD MANUAL: 7 % (ref 0–5)
NEUTROPHILS NFR BLD MANUAL: 83 % (ref 28–66)
PCO2 BLDV: 30 MMHG (ref 38–50)
PH BLDV: 7.32 UNITS (ref 7.33–7.43)
PLATELET # BLD AUTO: 338 10^3/UL (ref 150–450)
PLATELET BLD QL SMEAR: NORMAL
PO2 BLDV: 64.9 MMHG (ref 30–50)
POIKILOCYTOSIS BLD QL SMEAR: (no result)
POTASSIUM SERPL-SCNC: 4.5 MEQ/L (ref 3.5–5.1)
PROT SERPL-MCNC: 5.4 GM/DL (ref 6.4–8.2)
PROTHROMBIN TIME: 28.1 SECONDS (ref 11.8–14)
RBC # BLD AUTO: 3.65 10^6/UL (ref 4–5.4)
SAO2 % BLDV: 90.2 % (ref 60–80)
SODIUM SERPL-SCNC: 136 MEQ/L (ref 136–145)
TROPONIN I SERPL-MCNC: < 0.02 NG/ML (ref ?–0.1)
WBC # BLD AUTO: 13.6 10^3/UL (ref 4–10)

## 2020-07-09 RX ADMIN — Medication SCH MG: at 21:37

## 2020-07-09 RX ADMIN — OMEPRAZOLE SCH MG: 20 CAPSULE, DELAYED RELEASE ORAL at 21:33

## 2020-07-09 RX ADMIN — FERROUS SULFATE TAB 325 MG (65 MG ELEMENTAL FE) SCH MG: 325 (65 FE) TAB at 21:33

## 2020-07-09 RX ADMIN — APIXABAN SCH MG: 5 TABLET, FILM COATED ORAL at 21:33

## 2020-07-09 RX ADMIN — MEROPENEM AND SODIUM CHLORIDE SCH MLS/HR: 1 INJECTION, SOLUTION INTRAVENOUS at 21:33

## 2020-07-09 NOTE — REP
CHEST, SINGLE VIEW:

 

Single view of the chest is performed and compared to prior study of 06/29/2020.

 

There is no acute infiltrate.  Heart is slightly enlarged.  There is a hiatal

hernia.  Mediastinal silhouette is unchanged.

 

IMPRESSION:

 

No acute pulmonary disease.

 

 

Electronically Signed by

Kristian Suarez MD 07/12/2020 10:39 P

## 2020-07-09 NOTE — HPEPDOC
[facility] Medical History & Physical


Date of Admission


2020


Date of Service:  2020





History and Physical


CHIEF COMPLAINT: Abdominal pain





HISTORY OF PRESENT ILLNESS: Ms. Cobb presents to [facility] ER from formerly [facility]

largely because staff at the [facility] beds at the [facility] noticed her abscess drain placed on her 

previous admission (May 20th 2020) was draining dark, foul smelling fluid. EMS 

brought in the patient and received a poor history from [facility]. 

Additionally she complains of LLQ abdominal pain of a few weeks duration. She 

states this pain is sharp and sometimes radiates into the mid-abdomen. She rates

this pain at a 5/10 constantly and and denies having a clear inciting event. She

denies that eating or defecating make the pain worse. She also reports 

occasionally seeing bloody streaks on her toilet paper in the recent weeks as 

well. She also reports some pink colored urine of unknown duration. She denies 

any recent fatigue, fevers, sweats/chills, weight loss, chest pain, SOB, recent 

illness, diarrhea or change in stool caliber. Finally it should be noted that 

the patient has possible cognitive/memory impairment, making it difficult to 

assess the validity of her history. 


Much of the remaining history was obtained by chart review.





PAST MEDICAL HISTORY:


1. Right hydronephrosis.


2. Perforated viscus secondary to perforated diverticula, diverticular abscess,


3. status post IR drainage.


4. Hyponatremia.


5. Hypokalemia.


6. Chronic atrial fibrillation.


7. Hypothyroidism.


8. History of CVA secondary to patent foramen ovale (PFO).


9. Reflux.


10. Hyperlipidemia.


11. Dementia.


12. PFO.





PAST SURGICAL HISTORY:


1. Transverse carpal tunnel release 


2. Left rotator cuff repair


3. Right total hip arthroplasty


4. Revision of right hip


5. 


6. Drainage for diverticular abscess 





SOCIAL HISTORY:


Marital status: 


Resides in: [facility], formerly in [place]


Tobacco use: Denies


ETOH: Denies


Illicit drug use: Denies


IV drug use: Denies


Other relevant social factors: Lives at formerly [facility]





FAMILY HISTORY:


Unable to recall





ALLERGIES: Please see below.





REVIEW OF SYSTEMS:


CONSTITUTIONAL: Denies recent fevers, chills, night sweats, weight loss


HEENT: Denies Headache, vision or hearing changes, recent URI


CARDIOVASCULAR: Does complain of exertional anginal pain. Denies palpitations


RESPIRATORY: Denies any SOB, cough, sputum production, PND


GASTROINTESTINAL: Positive for LLQ abdominal pain and mild hematochezia. Denies 

diarrhea, constipation, change in stool caliber


GENITOURINARY: Positive for hematuria. Negative for dysuria, fecaluria 


NEUROLOGICAL: No vision or hearing changes. 





HOME MEDICATIONS: Please see below. 





PHYSICAL EXAMINATION:


VITAL SIGNS: Temperature 97.5, pulse 88, respiratory rate 16, blood pressure 

127/93, pulse oximetry 98% on room air.


GENERAL APPEARANCE: Thin/frail woman laying her bed not moving in mild apparent 

distress


HEENT: Normal sclera and conjunctiva. EOMI. Moist mucous membranes


CARDIOVASCULAR: Irregularly irregular heartbeat. Normal rate with no obvious 

murmurs, rubs or gallops appreciated


LUNGS: CTA B/L with no murmurs, rales, rhonchi


ABDOMEN: + for retching in the ER. Drain in place with mild seepage around the 

drain. Drain does appear to contain stool colored material with a foul-odor. 

Abdomen soft and nontender. No bruising or skin lesions.


MUSCULOSKELETAL: Frail extremities and poor strength but no spasticity or 

asymmetric/excessive weakness noted


EXTREMITIES: Cool legs and feet B/L with 1+ pitting edema to the mid-shin. 

Pulses 1/2 in LE B/L. Upper extremities are better perfused. No gross asymmetry 

in LE diameter


NEUROLOGICAL: No FND appreciated. Patient is alert and aware X3 but does require

repetitive questioning to get appropriate answers.


PSYCHIATRIC: Pt appears to have flat affect with minimal range. She does seem 

agreeably to treatment.





LABORATORY DATA: See below.





IMAGING: 


Abdomen/Pelvis CT (2020): No official read available


CXR (2020): No official read available





MICROBIOLOGY: Please see below. 





ASSESSMENT: 





PLAN:


1.Right pelvic abscess: 


-CT shows suspected right pelvic abscess


-IR has been consulted regarding drain placement and their assistance is greatly

appreciated


-Surgery was also consulted, Dr. Gosselin, who felt she was a poor surgical 

candidate


-1 dose of Zosyn was given in the ER and she has now been switched to Meropenem 

1gm IV day #1


-Serial CBC w/ diff daily


-Due to recurrent infections a Ig panel was ordered to r/o CVID and is normal


-This may be related to her recent admission for another intraabdominal abscess 

(May 2020), possible progression from microabscess formation with her original 

perforation





2.Sepsis:


-Leukocytosis of 13.6 + BP 91/61 +  w/ likely intra-abdominal abscess 

source


-Lactate 3.3, will continue to trend q4h until trending down


-1 dose of Zosyn was given in the ER and she has now been switched to Meropenem 

1gm IV day#1


-BP support with NS has been initiated, she is fluid responsive, levophed also 

ordered for pressor supper if needed, femoral line in place.





3.Hypoglycemia:


-Likely due to poor appetite


-Will place on hypoglycemic protocol and q6h FSBS checks, may discontinue if her

hypoglycemia resolves





4.Chronic A.Fib


-Patient is current rate controlled on atenolol 50 mg


-Anticoagulated on Apixaban





5.Dementia with mood disorder: 


-Continue Mirtazapine


-Continue Sertraline HCl





6.Hypoalbuminemia


-Albumin 1.9, likely related to poor nutritional status


-consider albumin supplementation if she remains hypotensive to help with 

intravascular repletion, especially consider her LE edema





7. Mild, asymptomatic anemia:


-Patient had GI bleed in last admission (May 2020)


-Test for fecal occult stool, if positive hold eliquis


-Monitor with serial H&H and transfuse if Hbg <7 or symptoms develop


-continue Ferrous Sulfate 325 mg PO BID





8. Hypothyroidism


- continue levothyroxine 50mcg





DVT Prophylaxis: Anticoagulated on Apixaban, teds and scds for mechanical


GI Prophylaxis: home PPI continued 20mg bid





Disposition: admitted to ICU pending clinical improvement and IR placement of 

drain








Attending attestation: Patient independently evaluated, agree with resident's 

plan.





Vital Signs





Vital Signs








  Date Time  Temp Pulse Resp B/P (MAP) Pulse Ox O2 Delivery O2 Flow Rate FiO2


 


20 17:21 97.5       


 


20 16:46    127/93 (104)    


 


20 16:37  88 16  98 Room Air  











Laboratory Data


Labs 24H


Laboratory Tests 2


20 15:58: 


Neutrophils (%) (Auto) , Nucleated Red Blood Cells % (auto) 0.0, Neutrophils 

83H, Band Neutrophils 6, Lymphocytes (Manual) 3L, Monocytes (Manual) 7H, 

Metamyelocytes 1H, Poikilocytosis 1+, Maud Cells 1+, Platelet Estimate NORMAL, 

Prothrombin Time 28.1H, Prothromb Time International Ratio 2.65, Activated 

Partial Thromboplast Time 39.5H, Blood Gas Bicarbonate Standard 16.5, Venous 

Blood pH 7.318L, Venous Blood Partial Pressure CO2 30.0L, Venous Blood Partial 

Pressure O2 64.9H, Venous Blood Total Carbon Dioxide 16.0L, Venous Blood HCO3 

15.0L, Venous Blood Oxygen Saturation 90.2H, Venous Blood Base Excess -9.8L, 

Anion Gap 13, Glomerular Filtration Rate > 60.0, Lactic Acid Level 3.3*H, 

Calcium Level 8.0L, Total Bilirubin 0.8, Direct Bilirubin 0.4H, Aspartate Amino 

Transf (AST/SGOT) 23, Alanine Aminotransferase (ALT/SGPT) 17, Alkaline 

Phosphatase 71, Total Creatine Kinase 23L, Creatine Kinase MB 1.2, Creatine 

Kinase MB Relative Index 5.22H, Troponin I < 0.02, C-Reactive Protein, 

Quantitative 9.64H, Total Protein 5.4L, Albumin 1.8L, Albumin/Globulin Ratio 

0.5L, Amylase Level 28, Immunoglobulin A 282.0, Immunoglobulin G 1360, 

Immunoglobulin M 48.3


20 16:02: 


POC Glucose (Misc Panel) 67L, POC Sodium (Misc Panel) 134L, POC Potassium (Misc 

Panel) 4.4, POC Chloride (Misc Panel) 105, POC Total CO2 (Misc Panel) 16.0L, POC

Blood Urea Nitrogen (Misc Panel 22, POC Ionized Calcium (Misc Panel) 4.3L, POC 

Creatinine (Misc Panel) 0.7, POC Hematocrit (Misc Panel) 34.0L


CBC/BMP


Laboratory Tests


20 15:58








Microbiology





Microbiology


20 Blood Culture, Received


         Pending


20 Blood Culture, Received


         Pending





Home Medications


Scheduled


Acetaminophen (Acetaminophen) 325 Mg Tablet, 650 MG PO DAILY


Apixaban (Eliquis) 5 Mg Tablet, 5 MG PO BID


Atenolol (Atenolol) 50 Mg Tablet, 50 MG PO DAILY


   HOLD FOR SBP <110 HR <60 


Ferrous Sulfate (Ferrous Sulfate) 325 Mg Tablet, 325 MG PO BID


Levothyroxine Sodium (Levothyroxine Sodium) 50 Mcg Tab, 50 MCG PO DAILY


Magnesium Chloride (Mag64) 64 Mg Tablet.dr, 64 MG PO BID


Mirtazapine (Remeron) 15 Mg Tablet, 15 MG PO QHS


Multivitamins (Thera M Plus Tablet) 1 Tab Tab, 1 TAB PO DAILY


Omeprazole (Omeprazole) 20 Mg Cap, 20 MG PO BID


Sertraline HCl (Sertraline HCl) 50 Mg Tablet, 50 MG PO QHS





Scheduled PRN


Acetaminophen (Tylenol) 325 Mg Tablet, 650 MG PO QID PRN for PAIN


Acetaminophen (Feverall) 650 Mg Supp.rect, 650 MG MA Q4H PRN for PAIN / FEVER


Bisacodyl (Dulcolax) 10 Mg Supp.rect, 10 MG MA DAILY PRN for CONSTIPATION


Dextrose (Glucose) 4 Gm Tab.chew, 1 CHW PO for LOW BLOOD SUGAR


Magnesium Hydroxide (Milk of Magnesia) 400 Mg/5 Ml Oral.susp, 2,400 MG PO DAILY 

PRN for CONSTIPATION


Sodium Phosphate,Mono-Dibasic (Fleet Enema) 133 Ml Enema, 1 NICK MA DAILY PRN for

CONSTIPATION





Allergies


Coded Allergies:  


     No Known Allergies (Unverified , 20)





A-FIB/CHADSVASC


A-FIB History


Current/History of A-Fib/PAF?:  Yes


Current PO Anticoag Therapy:  Yes





GME ATTESTATION


GME ATTESTATION


My faculty preceptor for this patient encounter was physically present during 

the encounter and was fully available. All aspects of the patient interview, 

examination, medical decision making process, and medical care plan development 

were reviewed and approved by the faculty preceptor. The faculty preceptor is 

aware and concurs with the plan as stated in the body of this note and will 

attest to such by his/her cosignature.





ATTENDING NOTE


I have independendently interviewed and examined the patient at the bedside, and

agree with the documentation above. The patient and her daughter, HCP, agree 

with the current management plan.





addendum:


PROTEIN CALORIE MALNUTRITION BMI 20.7


-hypoalbuminemia due to poor nutritional status with albumin 1.8.











DANIEL CHRISTY OMS-3             2020 18:45


EDWINA DING D.O.         2020 18:57


MERCY BLACKWOOD MD            Jul 10, 2020 14:41


TY MOY MD              2020 10:37

## 2020-07-10 VITALS — DIASTOLIC BLOOD PRESSURE: 62 MMHG | SYSTOLIC BLOOD PRESSURE: 92 MMHG

## 2020-07-10 VITALS — SYSTOLIC BLOOD PRESSURE: 95 MMHG | DIASTOLIC BLOOD PRESSURE: 52 MMHG

## 2020-07-10 VITALS — DIASTOLIC BLOOD PRESSURE: 63 MMHG | SYSTOLIC BLOOD PRESSURE: 96 MMHG

## 2020-07-10 VITALS — SYSTOLIC BLOOD PRESSURE: 106 MMHG | DIASTOLIC BLOOD PRESSURE: 68 MMHG

## 2020-07-10 VITALS — DIASTOLIC BLOOD PRESSURE: 60 MMHG | SYSTOLIC BLOOD PRESSURE: 91 MMHG

## 2020-07-10 VITALS — SYSTOLIC BLOOD PRESSURE: 106 MMHG | DIASTOLIC BLOOD PRESSURE: 61 MMHG

## 2020-07-10 VITALS — SYSTOLIC BLOOD PRESSURE: 102 MMHG | DIASTOLIC BLOOD PRESSURE: 71 MMHG

## 2020-07-10 VITALS — SYSTOLIC BLOOD PRESSURE: 94 MMHG | DIASTOLIC BLOOD PRESSURE: 68 MMHG

## 2020-07-10 VITALS — SYSTOLIC BLOOD PRESSURE: 93 MMHG | DIASTOLIC BLOOD PRESSURE: 71 MMHG

## 2020-07-10 VITALS — DIASTOLIC BLOOD PRESSURE: 59 MMHG | SYSTOLIC BLOOD PRESSURE: 89 MMHG

## 2020-07-10 LAB
ANISOCYTOSIS BLD QL SMEAR: (no result)
APTT BLD: 46.3 SECONDS (ref 25–38.4)
BUN SERPL-MCNC: 25 MG/DL (ref 7–18)
CALCIUM SERPL-MCNC: 8 MG/DL (ref 8.8–10.2)
CHLORIDE SERPL-SCNC: 106 MEQ/L (ref 98–107)
CO2 SERPL-SCNC: 14 MEQ/L (ref 21–32)
CREAT SERPL-MCNC: 0.79 MG/DL (ref 0.55–1.3)
GFR SERPL CREATININE-BSD FRML MDRD: > 60 ML/MIN/{1.73_M2} (ref 39–?)
GLUCOSE SERPL-MCNC: 129 MG/DL (ref 70–100)
HCT VFR BLD AUTO: 32.3 % (ref 36–47)
HGB BLD-MCNC: 10.8 G/DL (ref 12–15.5)
INR PPP: 2.84
LYMPHOCYTES NFR BLD MANUAL: 7 % (ref 16–44)
MCH RBC QN AUTO: 29.9 PG (ref 27–33)
MCHC RBC AUTO-ENTMCNC: 33.4 G/DL (ref 32–36.5)
MCV RBC AUTO: 89.5 FL (ref 80–96)
MONOCYTES NFR BLD MANUAL: 7 % (ref 0–5)
NEUTROPHILS NFR BLD MANUAL: 83 % (ref 28–66)
PLATELET # BLD AUTO: 347 10^3/UL (ref 150–450)
PLATELET BLD QL SMEAR: NORMAL
POTASSIUM SERPL-SCNC: 4.4 MEQ/L (ref 3.5–5.1)
PROTHROMBIN TIME: 29.7 SECONDS (ref 11.8–14)
RBC # BLD AUTO: 3.61 10^6/UL (ref 4–5.4)
SODIUM SERPL-SCNC: 134 MEQ/L (ref 136–145)
WBC # BLD AUTO: 17.7 10^3/UL (ref 4–10)

## 2020-07-10 RX ADMIN — DEXTROSE AND SODIUM CHLORIDE SCH MLS/HR: 5; 450 INJECTION, SOLUTION INTRAVENOUS at 08:30

## 2020-07-10 RX ADMIN — DEXTROSE AND SODIUM CHLORIDE SCH MLS/HR: 5; 450 INJECTION, SOLUTION INTRAVENOUS at 00:01

## 2020-07-10 RX ADMIN — MEROPENEM AND SODIUM CHLORIDE SCH MLS/HR: 1 INJECTION, SOLUTION INTRAVENOUS at 14:10

## 2020-07-10 RX ADMIN — OMEPRAZOLE SCH MG: 20 CAPSULE, DELAYED RELEASE ORAL at 08:31

## 2020-07-10 RX ADMIN — FERROUS SULFATE TAB 325 MG (65 MG ELEMENTAL FE) SCH MG: 325 (65 FE) TAB at 08:31

## 2020-07-10 RX ADMIN — Medication SCH MG: at 08:31

## 2020-07-10 RX ADMIN — APIXABAN SCH MG: 5 TABLET, FILM COATED ORAL at 08:31

## 2020-07-10 RX ADMIN — MEROPENEM AND SODIUM CHLORIDE SCH MLS/HR: 1 INJECTION, SOLUTION INTRAVENOUS at 05:56

## 2020-07-10 NOTE — ECGEPIP
[facility] - ED

                                       

                                       Test Date:    2020

Pat Name:     VALENTIN CASTRO                Department:   

Patient ID:   [id number]                 Room:         -

Gender:       Female                   Technician:   whit

:          1940               Requested By: Sharmin Dimas 

Order Number: [id number]     Reading MD:   Sharmin Dimas

                                 Measurements

Intervals                              Axis          

Rate:         96                       P:            

NH:           0                        QRS:          -10

QRSD:         94                       T:            171

QT:           342                                    

QTc:          434                                    

                           Interpretive Statements

ATRIAL FIBRILLATION

LOW QRS VOLTAGE IN EXTREMITY LEADS

POSSIBLE ANTERIOR MI

SIMILAR 20

Electronically Signed on 7- 10:11:51 EDT by Sharmin Dimas

## 2020-07-10 NOTE — REP
REASON FOR EXAM:  Sepsis.

 

COMPARISON EXAM:  06/29/2020

 

CONTRAST:  100 mL Isovue-370.

 

The lung bases are clear and unchanged.

 

There is a hiatal hernia, status quo.  The gallbladder is slightly enlarged and

increased in size from the prior exam.  The liver, spleen, pancreas, adrenal

glands, and kidneys are unchanged.  The abdominal aorta and para-aortic regions

are unchanged.  Limited evaluation of the bowel loops and their mesenteries shows

them to be unchanged.  There is a moderate amount of free fluid in the pelvis,

which is unchanged.  Once again, there is spray artifact arising from a right hip

prosthesis, obscuring multiple pelvic images.  Once again, there is a surgical

drainage tube in the right lower quadrant.  No abnormal adenopathy has developed

in the abdomen or pelvis.

 

In the region of the surgical drainage tube, there appears to be an air-fluid

level, but this abuts noncontrast opacified bowel and cannot be definitively

delineated from it.

 

There is no change in the osseous structures.

 

IMPRESSION:

 

1.  Marked exam limitations.

 

2.  Surgical drainage tube, as described above.

 

3.  Possible right lower quadrant air-fluid level indiscernible from bowel within

the region of the drainage tube.

 

4.  Other findings and chronic changes as described above.

 

 

Electronically Signed by

Won Briceno DO 07/10/2020 09:15 A

## 2020-07-10 NOTE — IPNPDOC
Date Seen


The patient was seen on 7/10/20.





Progress Note


Discussed with Daughter, Saialja Martinez, [phone] that 


the patient has a very poor overall prognosis due to 


significant co-morbid conditions, 


frail condition with little reserve for recovery despite


attempts to improve her protein calorie malnutrition and


failure to thrive.  


-since the patient has refused surgery, her condition


will most likely cause prolonged suffering despite


supportive care.


-Daughter and son are open to comfort measures only, as 


they see their mother suffer with no definitive cure since


the patient has refused surgery, and even with surgery,


she will have a prolonged and uncertain recovery period.


-Her children will visit later today to discuss comfort measures


with the patient.


-Per my conversation with the patient, she noded "NO " when I


asked about surgery and colonoscopy. She did not respond


when I asker her if she wanted to be made CMO.





VS, I&O, 24H, Fishbone


Vital Signs/I&O





Vital Signs








  Date Time  Temp Pulse Resp B/P (MAP) Pulse Ox O2 Delivery O2 Flow Rate FiO2


 


7/10/20 12:52  90  93/71 (78) 99 Room Air  


 


7/10/20 10:00   18     


 


7/10/20 08:00 97.4       














I&O- Last 24 Hours up to 6 AM 


 


 7/10/20





 06:00


 


Intake Total 3290 ml


 


Output Total 55 ml


 


Balance 3235 ml











Laboratory Data


24H LABS


Laboratory Tests 2


7/9/20 15:58: 


Neutrophils (%) (Auto) , Nucleated Red Blood Cells % (auto) 0.0, Neutrophils 

83H, Band Neutrophils 6, Lymphocytes (Manual) 3L, Monocytes (Manual) 7H, 

Metamyelocytes 1H, Poikilocytosis 1+, Juliana Cells 1+, Platelet Estimate NORMAL, 

Prothrombin Time 28.1H, Prothromb Time International Ratio 2.65, Activated 

Partial Thromboplast Time 39.5H, Blood Gas Bicarbonate Standard 16.5, Venous 

Blood pH 7.318L, Venous Blood Partial Pressure CO2 30.0L, Venous Blood Partial 

Pressure O2 64.9H, Venous Blood Total Carbon Dioxide 16.0L, Venous Blood HCO3 

15.0L, Venous Blood Oxygen Saturation 90.2H, Venous Blood Base Excess -9.8L, 

Anion Gap 13, Glomerular Filtration Rate > 60.0, Lactic Acid Level 3.3*H, 

Calcium Level 8.0L, Total Bilirubin 0.8, Direct Bilirubin 0.4H, Aspartate Amino 

Transf (AST/SGOT) 23, Alanine Aminotransferase (ALT/SGPT) 17, Alkaline 

Phosphatase 71, Total Creatine Kinase 23L, Creatine Kinase MB 1.2, Creatine 

Kinase MB Relative Index 5.22H, Troponin I < 0.02, C-Reactive Protein, 

Quantitative 9.64H, Total Protein 5.4L, Albumin 1.8L, Albumin/Globulin Ratio 

0.5L, Amylase Level 28, Immunoglobulin A 282.0, Immunoglobulin G 1360, 

Immunoglobulin M 48.3


7/9/20 16:02: 


POC Glucose (Misc Panel) 67L, POC Sodium (Misc Panel) 134L, POC Potassium (Misc 

Panel) 4.4, POC Chloride (Misc Panel) 105, POC Total CO2 (Misc Panel) 16.0L, POC

Blood Urea Nitrogen (Misc Panel 22, POC Ionized Calcium (Misc Panel) 4.3L, POC 

Creatinine (Misc Panel) 0.7, POC Hematocrit (Misc Panel) 34.0L


7/9/20 21:35: Lactic Acid Followup at 4 Hours 2.3*H


7/10/20 00:04: Bedside Glucose (Misc Panel) 67L


7/10/20 05:10: 


Neutrophils (%) (Auto) , Nucleated Red Blood Cells % (auto) 0.0, Neutrophils 

83H, Band Neutrophils 3, Lymphocytes (Manual) 7L, Monocytes (Manual) 7H, 

Anisocytosis 2+, Platelet Estimate NORMAL, Prothrombin Time 29.7H, Prothromb 

Time International Ratio 2.84, Activated Partial Thromboplast Time 46.3H, Anion 

Gap 14, Glomerular Filtration Rate > 60.0, Calcium Level 8.0L


7/10/20 11:58: Bedside Glucose (Misc Panel) 166H


CBC/BMP


Laboratory Tests


7/9/20 15:58








7/10/20 05:10








Microbiology





Microbiology


7/9/20 Stool Occult Blood (DWAYNE) - Final, Complete


         


7/9/20 Blood Culture, Received


         Pending


7/9/20 Blood Culture, Received


         Pending











MERCY BLACKWOOD MD            Jul 10, 2020 15:12

## 2020-07-11 RX ADMIN — SODIUM CHLORIDE SCH ML: 9 INJECTION, SOLUTION INTRAMUSCULAR; INTRAVENOUS; SUBCUTANEOUS at 17:39

## 2020-07-11 RX ADMIN — Medication SCH UNITS: at 17:39

## 2020-07-11 NOTE — IPNPDOC
Text Note


Date of Service


The patient was seen on 7/11/20.





NOTE


Hospitalist Progress Note





Subjective:


Patient agreed to comfort measures only last night.  Antibiotics and other 

medical drugs/measures were discontinued.  Orders for pain control and comfort 

were already put in last night. The patient does have a PICC line in place, we 

will use this for all IV medications for ease of administration and patient 

comfort. She does not have any additional requests at this time.





Objective:


Physical examination was not performed as this would not change the treatment 

plan.





Assessment:


Sepsis secondary to recurrent pelvic abscess


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


Metabolic acidosis secondary to sepsis with lactic acidosis


Protein calorie malnutrition


Chronic atrial fibrillation


Hypothyroidism


Depression





Plan:


Comfort measures are now in place.











GABRIELLE REA DO               Jul 11, 2020 13:48

## 2020-07-11 NOTE — CR
DATE OF CONSULTATION:

 

Asked to consult by Dr. Quarles for evaluation of and followup on

intra-abdominal abscess and antibiotic management.

 

HISTORY OF PRESENT ILLNESS:

Mrs. Cobb is a 80-year-old female with a history of diverticular disease and

perforated viscus since .  The patient has had multiple intra-
abdominal

abscess that have been drained on 2020.  Intra-abdominal abscess culture

was positive for Actinomyces bacteroides. Escherichia (E) coli and Klebsiella.

The patient was discharged to the nursing home  after that hospitalization was

readmitted on May 20th again with another intra-abdominal collection that was

cultured that was positive for E-coli, Streptococcus mitis bacteroides and the

patient had E-coli bacteremia.  During that hospitalization, she received

intravenous (IV) Rocephin and Flagyl for a total of 14 days and then switched to

cefdinir and Flagyl on  at the nursing home to finish the course for a

total of 6 weeks on .  Since then, the patient has had multiple other

complications.  She was seen in the emergency room on  with what 
sounded

like seizure-like activity.  She had a CT abdomen that showed air in the urinary

bladder that was concerning for a fistula with worsening abscess in her abdomen

with 9.2 x 4.5 x 3.9 cm abscess and a thickened rectosigmoid consistent with

diverticulitis, chronic cholecystitis.  The patient continues complaining of

abdominal pain.  She has had some rectal bleeding.  A catheter has been placed 
in

her urinary bladder in the intensive care unit (ICU) yesterday, which she pulled

and there has been pink urine as well as stool in the catheter again concerning

for an intra-abdominal fistula to the bladder.  The patient is not a surgical

candidate.  She was seen by Dr. Gosselin and there is discussion of COMFORT

MEASURES ONLY.  She has received IV meropenem.  The patient has had no fever, 
but

she is slightly hypotensive and that is why she has been placed in the ICU.

 

PAST MEDICAL HISTORY:

Significant for perforated diverticula with secondary intra-abdominal abscesses

2020, has been on antibiotics since then, status post drainage procedure

with a drain still in place draining foul-smelling what looks like stools.

Hyponatremia, hypokalemia, atrial fibrillation, right-sided hydronephrosis from

abscess, hypothyroidism, history of CVA ,  gastroesophageal reflux disease,

hyperlipidemia, dementia.

 

PAST SURGICAL HISTORY:

Carpal tunnel release, rotator cuff repair, right total hip arthroplasty,

 (C) section.

 

SOCIAL HISTORY:

She is a .  She is currently at [facility].  Her daughter

is her healthcare proxy.  She denies alcohol or drug use.

 

ALLERGIES:

NO KNOWN DRUG ALLERGIES.

 

MEDICATIONS:

- sodium bicarbonate IV

- multivitamin 1 tablet by mouth daily

- levothyroxine 50 mcg daily

- meropenem 1 gram IV every 8 hours

- ferrous sulfate 325 mg twice a day

- Slow-Mag 64 mg twice a day

- mirtazapine 15 mg by mouth at bedtime

- omeprazole 20 mg by mouth twice a day

- sertraline 50 mg by mouth at bedtime

- apixaban 5 mg by mouth twice a day

- Tylenol as needed

 

PHYSICAL EXAMINATION:

She is a frail, elderly female.  It looks like she lost weight from previous

admission.

Heart:  Normal S1, S2.  No murmurs appreciated.

Lungs:  Clear.  No wheezes or rhonchi.  Diminished at the bases.

Abdomen:  Diffusely tender especially in the epigastric area.  Stools

foul-smelling, dark brown color with geno streaks.  Right upper quadrant

drain with brown stool-looking drainage.

Urine pink colored on the bed.  Incontinence.  Vasquez catheter was pulled by the

patient.

Extremities:  No clubbing, cyanosis or edema.

Oropharynx:  Dry mucosa with poor oral care.

Patient alert, oriented times two.  She responds appropriately, states she is in

pain mostly in her abdomen.  Moves all extremities.

 

LABORATORY DATA:

White count is 17.7, increased from 13.6, hemoglobin 10.8, hematocrit 32.3,

platelets 347, 83% neutrophils, 7% lymphocytes, 7% monocytes.

 

Sodium 134, potassium 4.4, chloride 106, bicarbonate 14, BUN 25, creatinine 
0.79,

glucose 129, lactic acid 2.3, calcium 8, AST 23, ALT 17, alkaline phosphatase 
71.

 

 

Stool occult positive.  Blood cultures pending.

 

CT abdomen was of poor quality, markedly limited.  There is right lower quadrant

air-fluid level discernible in the region of the drainage tube.  Not very 
helpful

CT scan but CT of the abdomen was reviewed from  during the ER visit,

which shows significant worsening of the abscess, fistula in the bladder 
probably

and again rectosigmoid consistent with colitis and diverticulitis.

 

IMPRESSION:

This is a 80-year-old female who has had a perforated viscus since 

with multiple intra-abdominal abscesses, has had an abdominal drain for over a

month, has had chronic antibiotics for over 2 months and now has evidence of

gastrointestinal (GI) bleeding with stool coming out of her bladder.  
Antibiotics

will not fix the problem.  She is not a surgical candidate.  At this point, I do

not foresee any improvement in her medical health and the patient's daughter has

been discussing comfort measures and I agree fully with that.  She has failed IV

antibiotics.

 

PLAN:

Suggest comfort measures.  Continue IV meropenem until this decision is made.

ALESSIA

## 2020-07-12 RX ADMIN — Medication SCH UNITS: at 05:07

## 2020-07-12 RX ADMIN — SODIUM CHLORIDE SCH ML: 9 INJECTION, SOLUTION INTRAMUSCULAR; INTRAVENOUS; SUBCUTANEOUS at 05:08

## 2020-07-12 RX ADMIN — Medication SCH UNITS: at 17:18

## 2020-07-12 RX ADMIN — SODIUM CHLORIDE SCH ML: 9 INJECTION, SOLUTION INTRAMUSCULAR; INTRAVENOUS; SUBCUTANEOUS at 17:18

## 2020-07-12 NOTE — IPNPDOC
Text Note


Date of Service


The patient was seen on 7/12/20.





NOTE


Subjective:


Patient seen at bedside.





Objective:


General: NAD, lying comfortably in bed





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 

malnutrition, chronic a-fib, hypothyroidism and depression.





Plan: continue with comfort measures only.





#Septick shock


- now CMO


- lactic acid was 3.3 in the setting of sepsis, hypotensive previously requiring

presser support





#severe protein calorie malnutrition


- albumin 1.8


- weight 54.7 kg





VS,Fishbone, I+O


VS, Fishbone, I+O





Vital Signs








  Date Time  Temp Pulse Resp B/P (MAP) Pulse Ox O2 Delivery O2 Flow Rate FiO2


 


7/10/20 18:00 97.4 88 16 106/61 (76) 95 Room Air  














I&O- Last 24 Hours up to 6 AM 


 


 7/12/20





 06:00


 


Intake Total 120 ml


 


Output Total 0 ml


 


Balance 120 ml

















TY MOY MD              Jul 12, 2020 18:37

## 2020-07-13 VITALS — SYSTOLIC BLOOD PRESSURE: 107 MMHG | DIASTOLIC BLOOD PRESSURE: 65 MMHG

## 2020-07-13 VITALS — SYSTOLIC BLOOD PRESSURE: 128 MMHG | DIASTOLIC BLOOD PRESSURE: 76 MMHG

## 2020-07-13 RX ADMIN — SODIUM CHLORIDE SCH ML: 9 INJECTION, SOLUTION INTRAMUSCULAR; INTRAVENOUS; SUBCUTANEOUS at 05:27

## 2020-07-13 RX ADMIN — Medication PRN UNITS: at 05:27

## 2020-07-13 RX ADMIN — SODIUM CHLORIDE SCH ML: 9 INJECTION, SOLUTION INTRAMUSCULAR; INTRAVENOUS; SUBCUTANEOUS at 17:51

## 2020-07-13 RX ADMIN — Medication SCH UNITS: at 17:51

## 2020-07-13 RX ADMIN — DOCUSATE SODIUM,SENNOSIDES SCH TAB: 50; 8.6 TABLET, FILM COATED ORAL at 09:00

## 2020-07-13 RX ADMIN — Medication SCH UNITS: at 05:27

## 2020-07-13 RX ADMIN — SODIUM CHLORIDE PRN ML: 9 INJECTION, SOLUTION INTRAMUSCULAR; INTRAVENOUS; SUBCUTANEOUS at 05:28

## 2020-07-14 VITALS — DIASTOLIC BLOOD PRESSURE: 76 MMHG | SYSTOLIC BLOOD PRESSURE: 109 MMHG

## 2020-07-14 RX ADMIN — Medication SCH UNITS: at 06:11

## 2020-07-14 RX ADMIN — Medication PRN UNITS: at 06:11

## 2020-07-14 RX ADMIN — SODIUM CHLORIDE PRN ML: 9 INJECTION, SOLUTION INTRAMUSCULAR; INTRAVENOUS; SUBCUTANEOUS at 06:12

## 2020-07-14 RX ADMIN — SODIUM CHLORIDE SCH ML: 9 INJECTION, SOLUTION INTRAMUSCULAR; INTRAVENOUS; SUBCUTANEOUS at 06:11

## 2020-07-14 RX ADMIN — DOCUSATE SODIUM,SENNOSIDES SCH TAB: 50; 8.6 TABLET, FILM COATED ORAL at 08:56

## 2020-07-14 NOTE — DS.PDOC
Discharge Summary


General


Date of Admission


Jul 9, 2020 at 17:04


Date of Discharge


7/14/20





Discharge Summary


PROCEDURES PERFORMED DURING STAY: [None].





ADMITTING DIAGNOSES: 


1. R. pelvic abscess


2. Sepsis


3. Hypoglycemia


4. chronic Afib


5. Dementia with mood disorder


6. Hypoalbuminemia


7. asymptomatic anemia


8. Hypothyroidism





DISCHARGE DIAGNOSES:


1. R. pelvic abscess


2. Sepsis


3. Hypoglycemia


4. chronic Afib


5. Dementia with mood disorder


6. Hypoalbuminemia


7. asymptomatic anemia


8. Hypothyroidism





COMPLICATIONS/CHIEF COMPLAINT: Pelvic Abscess In Female.





HISTORY OF PRESENT ILLNESS:


"Ms. Cobb presents to [facility] ER from [facility] largely because staff at 

the [facility] noticed her abscess drain placed on her previous admission (May 

20th 2020) was draining dark, foul smelling fluid. EMS brought in the patient 

and received a poor history from [facility]. Additionally she complains of 

LLQ abdominal pain of a few weeks duration. She states this pain is sharp and 

sometimes radiates into the mid-abdomen. She rates this pain at a 5/10 consta

ntly and and denies having a clear inciting event. She denies that eating or 

defecating make the pain worse. She also reports occasionally seeing bloody 

streaks on her toilet paper in the recent weeks as well. She also reports some 

pink colored urine of unknown duration. She denies any recent fatigue, fevers, 

sweats/chills, weight loss, chest pain, SOB, recent illness, diarrhea or change 

in stool caliber. Finally it should be noted that the patient has possible 

cognitive/memory impairment, making it difficult to assess the validity of her 

history. 


Much of the remaining history was obtained by chart review."





HOSPITAL COURSE:


Patient's course of hospitalization for sepsis noted to be hypotensive requiring

 levophed support and complains of diffuse pain. Eventually was made CMO by 

patient/family and patient have been managed primarily for comfort. Patient is 

to be discharged to [facility] for comfort measures in the facility.





DISCHARGE MEDICATIONS: Please see below.


 


ALLERGIES: Please see below.





PHYSICAL EXAMINATION ON DISCHARGE:


VITAL SIGNS: Please see below.


General: Alert, malaise 


Eyes: Normal sclera, EOMI


HENT: Atraumatic


Cardiovascular: Normal rate


Pulmonary: no wheezing


GI: Soft, nondistended


Skin: Warm and dry





LABORATORY DATA: Please see below.





IMAGING: 


CXR- No acute pulmonary disease.


Abdomen/pelvis CT- 


1.  Marked exam limitations.


 


2.  Surgical drainage tube, as described above.


 


3.  Possible right lower quadrant air-fluid level indiscernible from bowel 

within





PROGNOSIS: poor





ACTIVITY: [As tolerated].





DIET: regular





DISCHARGE PLAN:


Continue comfort care measures at [facility]





DISPOSITION: [facility].





DISCHARGE INSTRUCTIONS:


Continue comfort care measures at [facility]





ITEMS TO FOLLOWUP ON ON OUTPATIENT:


None





DISCHARGE CONDITION: [Fair].





TIME SPENT ON DISCHARGE: 32 minutes.





Vital Signs/I&Os





Vital Signs








  Date Time  Temp Pulse Resp B/P (MAP) Pulse Ox O2 Delivery O2 Flow Rate FiO2


 


7/14/20 06:00 97.1 70 15 109/76 (87) 95 Room Air  














I&O- Last 24 Hours up to 6 AM 


 


 7/14/20





 06:00


 


Intake Total 210 ml


 


Balance 210 ml











Microbiology





Microbiology


7/14/20 Respiratory Virus Panel (PCR) ([facility]) - Final, Complete


          


7/9/20 Stool Occult Blood ([facility]) - Final, Complete


         


7/9/20 Blood Culture - Preliminary, Resulted


         No Growth after 72 hours. All specime...


7/9/20 Blood Culture - Preliminary, Resulted


         No Growth after 72 hours. All specime...





Discharge Medications


Scheduled


Acetaminophen (Acetaminophen) 325 Mg Tablet, 650 MG PO DAILY, (Reported)





Scheduled PRN


Acetaminophen (Tylenol) 325 Mg Tablet, 650 MG PO QID PRN for PAIN, (Reported)


Acetaminophen (Feverall) 650 Mg Supp.rect, 650 MG TX Q4H PRN for PAIN / FEVER, 

(Reported)


Bisacodyl (Dulcolax) 10 Mg Supp.rect, 10 MG TX DAILY PRN for CONSTIPATION, 

(Reported)


Hyoscyamine Sulfate (Hyoscyamine Sulfate) 0.125 Mg Tab.subl, 0.125 MG PO Q4HP 

PRN for TERMINAL SECRETIONS


   Use sublingually if unable to swallow 


Lorazepam (Ativan) 0.5 Mg Tablet, 0.5 MG PO Q4HP PRN for ANXIETY/AGITATION


   Use sublingually if unable to swallow 


Magnesium Hydroxide (Milk of Magnesia) 400 Mg/5 Ml Oral.susp, 2,400 MG PO DAILY 

PRN for CONSTIPATION, (Reported)


Morphine Sulfate (Morphine Sulfate) 100 Mg/5 Ml Solution, 0.25-1 ML PO Q2H PRN 

for PAIN OR DYSPNEA


   Use sublingually if unable to swallow 





Allergies


Coded Allergies:  


     No Known Allergies (Unverified , 6/29/20)











PINKY MCFADDEN MD                Jul 14, 2020 15:34

## 2023-01-10 NOTE — IPN
DATE:  07/10/2020

 

Patient did not require any Levophed drip last evening. Currently maintained 
with

mean arterial pressure of over 65 overnight. She continues to have malodorous

drainage, was noted to have a colovesical fistula with Vasquez catheter being

placed yesterday, found to have fecal material. She remained afebrile. Complains

of pain everywhere. She is moaning in pain, has her eyes closed. Daughter is 
open

to comfort measures only. Currently with surgical recommendations for drainage.

She is not a surgical candidate due to comorbid conditions and advanced age.

Currently broadly covered with intravenous meropenem and IV fluids for now.

 

PHYSICAL EXAMINATION:

Temperature 97.4, pulse 94, respiratory rate 18, blood pressure 94/68, 99% on

room air.

Generally, patient is frail, elderly, in no respiratory distress but her mouth

open and moaning at the bedside.

The patient is awake, alert to herself only. She has bitemporal wasting, dry

mucous membranes, poor dentition.

No jugular venous distention (JVD), thyromegaly.

Lungs are diminished but clear.

Heart: S1, S2, irregularly irregular.

Abdomen is soft. Patient has a left upper abdominal drainage, 50 mL from

overnight. She has feces, Vasquez catheter in place.

 

White count 17.7, hemoglobin 10, hematocrit 32, platelet count 347. Sodium 134,

potassium 4.4, chloride 106, bicarbonate 14, BUN 25, creatinine 0.79, glucose of

129, lactic acid of 2.3.

 

Laboratory data reviewed.

 

Right femoral catheter in place.

 

ASSESSMENT AND PLAN: This is a 80-year-old frail female with a history of patent

foramen ovale, diverticulitis with abscess and perforation, dementia,

hypothyroidism, atrial fibrillation and CVA, status post interventional 
radiology

(IR) drainage, presents with worsening drainage, found to have a colovesical

fistula after the Vasquez catheter was placed.

 

ACTIVE ISSUES ARE AS FOLLOWS:

Sepsis secondary to pelvic abscess. Patient is to have IR drainage placed.

 

Per surgery, patient would not tolerate sigmoid colectomy with colostomy. For

now, IV antibiotics and drainage catheter to be placed by interventional

radiology. Dr. Gosselin has been consulted as well as Dr. Francois. She is currently

on IV meropenem. Copious amounts of stool discharge. Hemoccult stool was

positive. Await recommendations from infectious disease.

 

Sepsis secondary to recurrent pelvic abscess, colovesical fistula. Patient is 
not

going to tolerate a sigmoidectomy with colostomy and currently is on 
conservative

management with IV antibiotics and drainage placement. Patient remains with

worsening white count, on meropenem.

 

Metabolic acidosis secondary to sepsis with lactic acidosis. Patient is not

tolerating a significant oral intake. Therefore, will start on a bicarbonate 
drip

for now.

 

Protein-calorie malnutrition. Albumin of 1.8 and body mass index (BMI) of 20.

Nutritionist consult. Patient is extremely cachectic and will benefit from

nutritionist consult versus comfort measures only.

 

Chronic atrial fibrillation. Due to low blood pressure and severe sepsis. Her

atenolol has been held for now. Rate appears to be controlled.

 

Hypothyroidism. On chronic Synthroid.

 

Depression. On Zoloft.

MTDD yes

## 2023-03-30 NOTE — ECHO
DATE OF STUDY:  04/29/2020

REFERRING PHYSICIAN:  Dr. Brian Sarkar

INDICATION:  Edema.

HEIGHT:  157 cm.

WEIGHT:  65 kg.

 

2-D MEASUREMENTS:

Aortic root:  2.8 cm

Left atrium:  3.8 cm

Aortic annulus:  2.1 cm

Ventricular septum:  1.37 cm

Posterior wall:  1.28 cm

Left ventricle diastole:  4.0 cm

Inferior vena cava:  2.0 cm with a marked reduction of respiratory variation

suggestive of elevated central venous pressure

 

DOPPLER MEASUREMENTS:

No aortic stenosis

No aortic regurgitation

Aortic valve velocity:  119 cm/sec

LVOT velocity:  69.8 cm/sec

Mild mitral regurgitation

No mitral stenosis

Mitral E velocity:  97.7 cm/sec

Mitral E deceleration:  168 ms

Moderate tricuspid regurgitation

Estimated right ventricular systolic pressure at least 4 mmHg assuming a right

atrial pressure of at least 20 mmHg

Trace pulmonic regurgitation

Pulmonary acceleration time:  129 ms

 

MITRAL ANNULAR TISSUE DOPPLER:

E prime septal:  6.0 cm/sec

E prime lateral:  11.6 cm/sec

 

DESCRIPTION:  The rhythm was atrial fibrillation with rapid ventricular rate.

Image quality was fair.  No pericardial effusion.  This was a 2-D, M-mode, color

flow Doppler and pulse wave Doppler examination and included mitral annular

tissue Doppler.

 

CONCLUSIONS:

1.  Mild concentric left ventricular hypertrophy.  Normal regional LV wall motion

and wall thickening.  Normal LV systolic function.  LVEF 65% by visual estimate.

At least some degree of LV diastolic function was present, but difficult to

classify the grade of LV diastolic function in the setting of atrial fibrillation

with rapid ventricular response

2.  Suggestive of moderate elevation of estimated right ventricle systolic

pressure (at least 40 mmHg) assuming right atrial pressure of at least 20 mmHg.

Normal right ventricle size and systolic function.  Inferior vena cava plethora

with reduced respiratory variation suggestion of elevated central venous pressure

of at least 20 mmHg.

3.  Moderate aortic valve sclerosis with a 3-cuspid aortic valve.  No aortic

stenosis or regurgitation.

4.  Moderate mitral annular calcification.  Mild mitral regurgitation.  No mitral

stenosis.

5.  Otherwise normal appearing echocardiogram Doppler findings. 168